# Patient Record
Sex: FEMALE | Race: WHITE | NOT HISPANIC OR LATINO | Employment: OTHER | URBAN - METROPOLITAN AREA
[De-identification: names, ages, dates, MRNs, and addresses within clinical notes are randomized per-mention and may not be internally consistent; named-entity substitution may affect disease eponyms.]

---

## 2017-01-20 ENCOUNTER — TELEPHONE (OUTPATIENT)
Dept: CARDIOLOGY | Facility: MEDICAL CENTER | Age: 67
End: 2017-01-20

## 2017-01-20 NOTE — TELEPHONE ENCOUNTER
Called by Dr. Weaver patient had an abnormal EKG apparently asymptomatic and on review looks to be junctional bradycardia with retrograde P waves.    Given she is asymptomatic she can follow-up with us at the next available appointment in [place] with the next month or 2 otherwise she'll have to come up to [place]    It is my pleasure to participate in the care of Ms. Sepulveda.  Please do not hesitate to contact me with questions or concerns.    Braxton Valencia MD PhD FAC  Cardiologist [facility] Heart and Vascular Health

## 2017-01-21 NOTE — TELEPHONE ENCOUNTER
Ashlee Sepulveda  Female, 66 y.o., 1950  Last Weight:  68.04 kg (150 lb)  Phone:  [phone]  PCP:  Karlie Ramos  Language:  English  Need Interp:  No  AllergiesNo Known Allergies  Health Maintenance:  Due  FYINone  Primary Ins:  MEDICARE  MRN:  [id number]  MyChart:  Code Exp  Next Appt:  02/15/2017      Call Documentation      Braxton Valencia M.D. at 1/20/2017 11:48 AM      Status: Signed         Expand All Collapse All    Called by Dr. Weaver patient had an abnormal EKG apparently asymptomatic and on review looks to be junctional bradycardia with retrograde P waves.    Given she is asymptomatic she can follow-up with us at the next available appointment in [place] with the next month or 2 otherwise she'll have to come up to [place]    It is my pleasure to participate in the care of Ms. Sepulveda.  Please do not hesitate to contact me with questions or concerns.    Braxton Valencia MD PhD [facility]  Cardiologist [facility] Heart and Vascular [facility]                         Visit Pharmacy      [facility] [address] [place] [address]           Routing History      Priority Sent On From To Message Type      1/20/2017 11:51 AM Braxton Valencia M.D. Margo Cam R.N. Patient Call       Created by      Braxton Valencia M.D. on 01/20/2017 11:48 AM         Patient scheduled for Feb 15th.

## 2017-02-15 ENCOUNTER — OFFICE VISIT (OUTPATIENT)
Dept: CARDIOLOGY | Facility: CLINIC | Age: 67
End: 2017-02-15
Payer: MEDICARE

## 2017-02-15 VITALS
HEART RATE: 74 BPM | WEIGHT: 160 LBS | HEIGHT: 65 IN | SYSTOLIC BLOOD PRESSURE: 130 MMHG | BODY MASS INDEX: 26.66 KG/M2 | DIASTOLIC BLOOD PRESSURE: 80 MMHG

## 2017-02-15 DIAGNOSIS — I10 ESSENTIAL HYPERTENSION, BENIGN: ICD-10-CM

## 2017-02-15 DIAGNOSIS — I25.5 ISCHEMIC CARDIOMYOPATHY: ICD-10-CM

## 2017-02-15 DIAGNOSIS — E78.5 DYSLIPIDEMIA: ICD-10-CM

## 2017-02-15 DIAGNOSIS — I44.2 COMPLETE HEART BLOCK (HCC): Primary | ICD-10-CM

## 2017-02-15 DIAGNOSIS — Z95.5 S/P CORONARY ARTERY STENT PLACEMENT: ICD-10-CM

## 2017-02-15 DIAGNOSIS — I25.10 CORONARY ARTERY DISEASE INVOLVING NATIVE CORONARY ARTERY OF NATIVE HEART WITHOUT ANGINA PECTORIS: ICD-10-CM

## 2017-02-15 DIAGNOSIS — I51.9 SYSTOLIC DYSFUNCTION: ICD-10-CM

## 2017-02-15 PROCEDURE — 1036F TOBACCO NON-USER: CPT | Performed by: INTERNAL MEDICINE

## 2017-02-15 PROCEDURE — 1101F PT FALLS ASSESS-DOCD LE1/YR: CPT | Mod: 8P | Performed by: INTERNAL MEDICINE

## 2017-02-15 PROCEDURE — 3017F COLORECTAL CA SCREEN DOC REV: CPT | Mod: 1P | Performed by: INTERNAL MEDICINE

## 2017-02-15 PROCEDURE — 99215 OFFICE O/P EST HI 40 MIN: CPT | Performed by: INTERNAL MEDICINE

## 2017-02-15 PROCEDURE — G8598 ASA/ANTIPLAT THER USED: HCPCS | Performed by: INTERNAL MEDICINE

## 2017-02-15 PROCEDURE — G8484 FLU IMMUNIZE NO ADMIN: HCPCS | Performed by: INTERNAL MEDICINE

## 2017-02-15 PROCEDURE — G8420 CALC BMI NORM PARAMETERS: HCPCS | Performed by: INTERNAL MEDICINE

## 2017-02-15 PROCEDURE — G8432 DEP SCR NOT DOC, RNG: HCPCS | Performed by: INTERNAL MEDICINE

## 2017-02-15 PROCEDURE — 3014F SCREEN MAMMO DOC REV: CPT | Mod: 8P | Performed by: INTERNAL MEDICINE

## 2017-02-15 PROCEDURE — 4040F PNEUMOC VAC/ADMIN/RCVD: CPT | Mod: 8P | Performed by: INTERNAL MEDICINE

## 2017-02-15 RX ORDER — AMLODIPINE BESYLATE 5 MG/1
5 TABLET ORAL DAILY
COMMUNITY
End: 2018-02-27 | Stop reason: SDUPTHER

## 2017-02-15 RX ORDER — ATORVASTATIN CALCIUM 80 MG/1
80 TABLET, FILM COATED ORAL NIGHTLY
COMMUNITY
End: 2017-02-15 | Stop reason: SDUPTHER

## 2017-02-15 RX ORDER — CLOPIDOGREL BISULFATE 75 MG/1
75 TABLET ORAL DAILY
COMMUNITY
End: 2017-11-16

## 2017-02-15 RX ORDER — ATORVASTATIN CALCIUM 80 MG/1
80 TABLET, FILM COATED ORAL DAILY
Qty: 30 TAB | Refills: 11 | Status: SHIPPED | OUTPATIENT
Start: 2017-02-15 | End: 2018-02-27 | Stop reason: SDUPTHER

## 2017-02-15 ASSESSMENT — ENCOUNTER SYMPTOMS: CARDIOVASCULAR NEGATIVE: 1

## 2017-02-15 NOTE — Clinical Note
Name:          Ashlee Sepulveda   YOB: 1950  Date:     2/16/2017      Kasandra Sinha M.D.  [address] [facility] [address]     Nikhil Perales MD  [address], [address]  [place] [address]  Phone: [phone]  Back Line: [phone]  Fax: [phone]  E-mail: [email or web address]   Dear Dr. Sinha,    We had the pleasure of seeing your patient, Ashlee Sepulveda, in Cardiology Clinic at [facility] Heart and Vascular today.    As you know, she is a 66-year-old woman with a history of coronary artery disease status post anterior STEMI in 10/2016 at which time she had TPA that recannalized her LAD followed by salvage PCI to her LAD and OM with drug eluting stents. She had an ejection fraction of 45% by echocardiogram at that time. She was lost to follow-up for a time, but has been referred back related to complete heart block (asymptomatic) incidentally on EKG.    I repeated an EKG in our office today and she continues to be in complete heart block with a junctional escape rate of 60 beats per minute, again asymptomatic. Related to that I have scheduled a chemistry panel and TSH which I have not seen in her work-up at this point. I ordered an echocardiogram to re-evaluate her ejection fraction. Provided that she has no reversible cause of heart block (pending chemistry panel and TSH), I will schedule her for pacemaker implantation. I did not see a statin on her medication list, and I added atorvastatin.    We will have the patient follow-up in 3 months with pacemaker implantation in the meantime presumably.    Thank you for the referral and please do not hesitate to contact me at any time. My contact information is listed above.    This note was dictated using Dragon speech recognition software.     A full note including my physical examination and a full list of rectified medications is available in our medical record, and can be faxed as well.    Nikhil Perales  MD  Cardiologist  [facility] for Heart and Vascular Health

## 2017-02-16 ENCOUNTER — TELEPHONE (OUTPATIENT)
Dept: CARDIOLOGY | Facility: MEDICAL CENTER | Age: 67
End: 2017-02-16

## 2017-02-16 NOTE — TELEPHONE ENCOUNTER
Called patient's home number - a male at that number advised me to call her work. I called and LM at patient's work to call me back to schedule PM insert.

## 2017-02-16 NOTE — PROGRESS NOTES
Subjective:   Ashlee Sepulveda is a 66-year-old woman with a history of coronary artery disease status post anterior STEMI in 10/2016 at which time she had TPA that recannalized her LAD followed by salvage PCI to her LAD and OM with drug eluting stents. She had an ejection fraction of 45% by echocardiogram at that time. She was lost to follow-up for a time, but has been referred back related to complete heart block (asymptomatic) incidentally on EKG.     she has no cardiovascular complaints today, specifically no syncope.  She denies any new onset fatigue, and has no dyspnea nor chest discomfort with her highest level of physical activity including housework 5 days per week.     I asked her about former use of statins given that she has not been prescribed one previously.  She denied any prior adverse effects with statin therapy.    Past Medical History   Diagnosis Date   • Rectal cancer (CMS-HCC)      10 years ago   • Myocardial infarct (CMS-HCC)    • Hypertension    • S/P coronary artery stent placement 2/15/2017   • Ischemic cardiomyopathy 10/7/2015   • Essential hypertension, benign 2/15/2017   • Dyslipidemia 2/15/2017   • Coronary artery disease involving native coronary artery of native heart without angina pectoris 2/15/2017     Past Surgical History   Procedure Laterality Date   • Stent placement       left groin     History reviewed. No pertinent family history.  History   Smoking status   • Former Smoker -- 1.00 packs/day for 20 years   • Types: Cigarettes   • Quit date: 09/30/2015   Smokeless tobacco   • Never Used     No Known Allergies  Outpatient Encounter Prescriptions as of 2/15/2017   Medication Sig Dispense Refill   • amlodipine (NORVASC) 5 MG Tab Take 5 mg by mouth every day.     • clopidogrel (PLAVIX) 75 MG Tab Take 75 mg by mouth every day.     • atorvastatin (LIPITOR) 80 MG tablet Take 1 Tab by mouth every day. 30 Tab 11   • nitroglycerin (NITROSTAT) 0.4 MG SL Tab Place 1 Tab under tongue as  "needed for Chest Pain. 25 Tab 0   • lisinopril (PRINIVIL) 10 MG Tab Take 1 Tab by mouth every day. 30 Tab 0   • [DISCONTINUED] atorvastatin (LIPITOR) 80 MG tablet Take 80 mg by mouth every evening.     • sulfamethoxazole-trimethoprim (BACTRIM DS) 800-160 MG tablet Take 1 Tab by mouth 2 times a day. 20 Tab 0   • aspirin (ASA) 325 MG Tab Take 1 Tab by mouth every day. 100 Tab 3     No facility-administered encounter medications on file as of 2/15/2017.     Review of Systems   Cardiovascular: Negative.    All other systems reviewed and are negative.       Objective:   /80 mmHg  Pulse 74  Ht 1.651 m (5' 5\")  Wt 72.576 kg (160 lb)  BMI 26.63 kg/m2    Physical Exam   Constitutional: She is oriented to person, place, and time. She appears well-developed and well-nourished. No distress.   Pleasant, middle-aged appearing woman in no distress   HENT:   Head: Normocephalic and atraumatic.   Eyes: Conjunctivae and EOM are normal. Pupils are equal, round, and reactive to light. No scleral icterus.   Neck: Neck supple. No JVD present. No tracheal deviation present.   Cardiovascular: Normal rate, normal heart sounds and intact distal pulses.  An irregular rhythm present. Exam reveals no gallop and no friction rub.    No murmur heard.  Pulses:       Dorsalis pedis pulses are 2+ on the right side, and 2+ on the left side.   No carotid bruits   Pulmonary/Chest: Effort normal and breath sounds normal. No stridor. No respiratory distress. She has no wheezes. She has no rales.   Abdominal: Soft. Bowel sounds are normal. She exhibits no distension.   Musculoskeletal: She exhibits no edema.   Neurological: She is alert and oriented to person, place, and time.   Skin: Skin is warm and dry. No rash noted. She is not diaphoretic. No erythema. No pallor.   Psychiatric: She has a normal mood and affect. Judgment and thought content normal.   Vitals reviewed.      Assessment:     1. Complete heart block (CMS-[facility])  ECHOCARDIOGRAM COMP " W/O CONT    BASIC METABOLIC PANEL    TSH+FREE T4   2. Coronary artery disease involving native coronary artery of native heart without angina pectoris  ECHOCARDIOGRAM COMP W/O CONT    atorvastatin (LIPITOR) 80 MG tablet   3. Ischemic cardiomyopathy  ECHOCARDIOGRAM COMP W/O CONT    atorvastatin (LIPITOR) 80 MG tablet   4. Systolic dysfunction  ECHOCARDIOGRAM COMP W/O CONT   5. Essential hypertension, benign     6. Dyslipidemia  atorvastatin (LIPITOR) 80 MG tablet   7. S/P coronary artery stent placement         Medical Decision Making:  Today's Assessment / Status / Plan:     Medical Decision Making:    I repeated an EKG in our office today and she continues to be in complete heart block with a junctional escape rate of 60 beats per minute, again asymptomatic.  Related to that I have scheduled a chemistry panel and TSH which I have not seen in her work-up at this point.  I ordered an echocardiogram to re-evaluate her ejection fraction.  Provided that she has no reversible cause of heart block (pending chemistry panel and TSH), I will schedule her for pacemaker implantation.  I did not see a statin on her medication list, and I added atorvastatin.    Nikhil Perales MD  Cardiologist, [facility] Heart and Vascular [place]

## 2017-02-16 NOTE — PROGRESS NOTES
Name:          Ashlee Sepulveda   YOB: 1950  Date:     2/16/2017      Kasadnra Sinha M.D.  [address] [facility] [address]     Nikhil Perales MD  [address], [address]  [place] [address]  Phone: [phone]  Back Line: [phone]  Fax: [phone]  E-mail: [email or web address]   Dear Dr. Sinha,    We had the pleasure of seeing your patient, Ashlee Sepulveda, in Cardiology Clinic at [facility] Heart and Vascular today.    As you know, she is a 66-year-old woman with a history of coronary artery disease status post anterior STEMI in 10/2016 at which time she had TPA that recannalized her LAD followed by salvage PCI to her LAD and OM with drug eluting stents. She had an ejection fraction of 45% by echocardiogram at that time. She was lost to follow-up for a time, but has been referred back related to complete heart block (asymptomatic) incidentally on EKG.    I repeated an EKG in our office today and she continues to be in complete heart block with a junctional escape rate of 60 beats per minute, again asymptomatic. Related to that I have scheduled a chemistry panel and TSH which I have not seen in her work-up at this point. I ordered an echocardiogram to re-evaluate her ejection fraction. Provided that she has no reversible cause of heart block (pending chemistry panel and TSH), I will schedule her for pacemaker implantation. I did not see a statin on her medication list, and I added atorvastatin.    We will have the patient follow-up in 3 months with pacemaker implantation in the meantime presumably.    Thank you for the referral and please do not hesitate to contact me at any time. My contact information is listed above.    This note was dictated using Dragon speech recognition software.     A full note including my physical examination and a full list of rectified medications is available in our medical record, and can be faxed as well.    Nikhil Perales  MD  Cardiologist  [facility] for Heart and Vascular Health

## 2017-02-16 NOTE — TELEPHONE ENCOUNTER
Patient scheduled for PM insert on 2-22-17 at [facility] with Dr. Lerma

## 2017-02-16 NOTE — TELEPHONE ENCOUNTER
----- Message from Luis Miguel Lopez Ass't sent at 2/15/2017  3:50 PM PST -----  Per Dr. QUEZADA, would like to set this patient up with a pacemaker implant, Ins: [employer], Dx:  Complete heart block.  Good phone number to reach patient:  [phone].

## 2017-02-22 ENCOUNTER — APPOINTMENT (OUTPATIENT)
Dept: RADIOLOGY | Facility: MEDICAL CENTER | Age: 67
End: 2017-02-22
Attending: INTERNAL MEDICINE
Payer: MEDICARE

## 2017-02-22 ENCOUNTER — HOSPITAL ENCOUNTER (OUTPATIENT)
Facility: MEDICAL CENTER | Age: 67
End: 2017-02-22
Attending: INTERNAL MEDICINE | Admitting: INTERNAL MEDICINE
Payer: MEDICARE

## 2017-02-22 DIAGNOSIS — I25.5 ISCHEMIC CARDIOMYOPATHY: ICD-10-CM

## 2017-02-22 DIAGNOSIS — I44.2 COMPLETE HEART BLOCK (HCC): ICD-10-CM

## 2017-02-22 DIAGNOSIS — I25.10 CORONARY ARTERY DISEASE INVOLVING NATIVE CORONARY ARTERY OF NATIVE HEART WITHOUT ANGINA PECTORIS: ICD-10-CM

## 2017-02-22 LAB — EKG IMPRESSION: NORMAL

## 2017-02-22 PROCEDURE — 93018 CV STRESS TEST I&R ONLY: CPT | Performed by: INTERNAL MEDICINE

## 2017-02-22 PROCEDURE — 93005 ELECTROCARDIOGRAM TRACING: CPT | Performed by: INTERNAL MEDICINE

## 2017-02-22 PROCEDURE — 93010 ELECTROCARDIOGRAM REPORT: CPT | Performed by: INTERNAL MEDICINE

## 2017-02-22 PROCEDURE — 93017 CV STRESS TEST TRACING ONLY: CPT | Performed by: INTERNAL MEDICINE

## 2017-02-22 NOTE — PROGRESS NOTES
Pt's procedure cancelled. MD follow up ph # provided to patient. Pt instructed to come back to cath lab waiting area to speak with MD after she and her  have lunch.

## 2017-03-03 DIAGNOSIS — I44.2 COMPLETE HEART BLOCK (HCC): ICD-10-CM

## 2017-03-03 DIAGNOSIS — I25.5 ISCHEMIC CARDIOMYOPATHY: ICD-10-CM

## 2017-03-03 DIAGNOSIS — I51.9 SYSTOLIC DYSFUNCTION: ICD-10-CM

## 2017-03-03 DIAGNOSIS — I25.10 CORONARY ARTERY DISEASE INVOLVING NATIVE CORONARY ARTERY OF NATIVE HEART WITHOUT ANGINA PECTORIS: ICD-10-CM

## 2017-05-17 ENCOUNTER — OFFICE VISIT (OUTPATIENT)
Dept: CARDIOLOGY | Facility: CLINIC | Age: 67
End: 2017-05-17
Payer: MEDICARE

## 2017-05-17 VITALS
HEIGHT: 66 IN | OXYGEN SATURATION: 95 % | DIASTOLIC BLOOD PRESSURE: 90 MMHG | SYSTOLIC BLOOD PRESSURE: 130 MMHG | WEIGHT: 155 LBS | HEART RATE: 54 BPM | BODY MASS INDEX: 24.91 KG/M2

## 2017-05-17 DIAGNOSIS — I25.10 CORONARY ARTERY DISEASE INVOLVING NATIVE CORONARY ARTERY OF NATIVE HEART WITHOUT ANGINA PECTORIS: ICD-10-CM

## 2017-05-17 DIAGNOSIS — I49.5 SICK SINUS SYNDROME (HCC): Primary | ICD-10-CM

## 2017-05-17 DIAGNOSIS — I10 ESSENTIAL HYPERTENSION, BENIGN: ICD-10-CM

## 2017-05-17 DIAGNOSIS — E78.5 DYSLIPIDEMIA: ICD-10-CM

## 2017-05-17 DIAGNOSIS — I21.09 ST ELEVATION MYOCARDIAL INFARCTION (STEMI) OF ANTERIOR WALL, SUBSEQUENT EPISODE OF CARE (HCC): ICD-10-CM

## 2017-05-17 DIAGNOSIS — I25.5 ISCHEMIC CARDIOMYOPATHY: ICD-10-CM

## 2017-05-17 DIAGNOSIS — Z95.5 S/P CORONARY ARTERY STENT PLACEMENT: ICD-10-CM

## 2017-05-17 PROBLEM — I44.2 COMPLETE HEART BLOCK (HCC): Status: RESOLVED | Noted: 2017-02-15 | Resolved: 2017-05-17

## 2017-05-17 PROCEDURE — 1036F TOBACCO NON-USER: CPT | Performed by: INTERNAL MEDICINE

## 2017-05-17 PROCEDURE — 1101F PT FALLS ASSESS-DOCD LE1/YR: CPT | Mod: 8P | Performed by: INTERNAL MEDICINE

## 2017-05-17 PROCEDURE — G8598 ASA/ANTIPLAT THER USED: HCPCS | Performed by: INTERNAL MEDICINE

## 2017-05-17 PROCEDURE — G8432 DEP SCR NOT DOC, RNG: HCPCS | Performed by: INTERNAL MEDICINE

## 2017-05-17 PROCEDURE — 99214 OFFICE O/P EST MOD 30 MIN: CPT | Performed by: INTERNAL MEDICINE

## 2017-05-17 PROCEDURE — 3014F SCREEN MAMMO DOC REV: CPT | Mod: 8P | Performed by: INTERNAL MEDICINE

## 2017-05-17 PROCEDURE — 4040F PNEUMOC VAC/ADMIN/RCVD: CPT | Mod: 8P | Performed by: INTERNAL MEDICINE

## 2017-05-17 PROCEDURE — G8419 CALC BMI OUT NRM PARAM NOF/U: HCPCS | Performed by: INTERNAL MEDICINE

## 2017-05-17 ASSESSMENT — ENCOUNTER SYMPTOMS: CARDIOVASCULAR NEGATIVE: 1

## 2017-05-17 NOTE — PROGRESS NOTES
Name:          Ashlee Sepulveda   YOB: 1950  Date:     5/17/2017      Kasandra Sinha M.D.  [address] [facility] [address]     Nikhil Perales MD  [address], [facility] [address]  [place] [address]  Phone: [phone]  Back Line: [phone]  Fax: [phone]  E-mail: [email or web address]   Dear Dr. Sinha,    We had the pleasure of seeing your patient, Ashlee Sepulveda, in Cardiology Clinic at [facility] Heart and Vascular today.    As you know, she is a 67-year-old woman with a history of coronary artery disease status post anterior STEMI in 10/2016 at which time she had TPA that recannalized her LAD followed by salvage PCI to her LAD and OM with drug eluting stents. She had an ejection fraction of 45% by echocardiogram at that time. She was lost to follow-up for a time, but has been referred back related to junctional escape secondary to sick sinus syndrome (asymptomatic).    She is doing very well, and again it been up in [facility] evaluated for her junction (retrograde) showing that she has no conduction system disease bundle of His. We did a submaximal exercise treadmill test at that time to rule out chronotropic incompetence, which she did not have. She had no ischemic changes on that test and a hypertensive response to exercise. At this point, her blood pressure is well controlled on amlodipine, and lisinopril. She continues on atorvastatin for her lipids. She had stopped aspirin related to senile purpura that was worse on dual antiplatelet therapy. I asked her to resume aspirin at 81 mg by mouth daily until October 2017. I ordered a repeat lipid panel to be done sometime before next visit in November. Otherwise, abdomen no changes to her medical regimen and scheduled no additional testing at this time. Also of note, she previously had an ejection fraction of 45%, which had resolved to normal left ventricular ejection fraction by repeat echocardiogram that we did in the evaluation for  possible pacemaker.    We will have the patient follow-up in November, 2017.    Thank you for the referral and please do not hesitate to contact me at any time. My contact information is listed above.    This note was dictated using Dragon speech recognition software.     A full note including my physical examination and a full list of rectified medications is available in our medical record, and can be faxed as well.    Nikhil Perales MD  Cardiologist  [facility] Heart and Vascular Health

## 2017-05-17 NOTE — Clinical Note
Name:          Ashlee Sepulveda   YOB: 1950  Date:     5/17/2017      Kasandra Sinha M.D.  [address] [facility] [address]     Nikhil Perales MD  [address], [facility] [address]  [place] [address]  Phone: [phone]  Back Line: [phone]  Fax: [phone]  E-mail: [email or web address]   Dear Dr. Sinha,    We had the pleasure of seeing your patient, Ashlee Sepulveda, in Cardiology Clinic at [facility] Heart and Vascular today.    As you know, she is a 67-year-old woman with a history of coronary artery disease status post anterior STEMI in 10/2016 at which time she had TPA that recannalized her LAD followed by salvage PCI to her LAD and OM with drug eluting stents. She had an ejection fraction of 45% by echocardiogram at that time. She was lost to follow-up for a time, but has been referred back related to junctional escape secondary to sick sinus syndrome (asymptomatic).    She is doing very well, and again it been up in [facility] evaluated for her junction (retrograde) showing that she has no conduction system disease bundle of His. We did a submaximal exercise treadmill test at that time to rule out chronotropic incompetence, which she did not have. She had no ischemic changes on that test and a hypertensive response to exercise. At this point, her blood pressure is well controlled on amlodipine, and lisinopril. She continues on atorvastatin for her lipids. She had stopped aspirin related to senile purpura that was worse on dual antiplatelet therapy. I asked her to resume aspirin at 81 mg by mouth daily until October 2017. I ordered a repeat lipid panel to be done sometime before next visit in November. Otherwise, abdomen no changes to her medical regimen and scheduled no additional testing at this time. Also of note, she previously had an ejection fraction of 45%, which had resolved to normal left ventricular ejection fraction by repeat echocardiogram that we did in the evaluation for  possible pacemaker.    We will have the patient follow-up in November, 2017.    Thank you for the referral and please do not hesitate to contact me at any time. My contact information is listed above.    This note was dictated using Dragon speech recognition software.     A full note including my physical examination and a full list of rectified medications is available in our medical record, and can be faxed as well.    Nikhil Perales MD  Cardiologist  [facility] Heart and Vascular Health

## 2017-05-17 NOTE — PROGRESS NOTES
Subjective:   Ashlee Sepulveda is a 67 -year-old woman with a history of coronary artery disease status post anterior STEMI in 10/2016 at which time she had TPA that recannalized her LAD followed by salvage PCI to her LAD and OM with drug eluting stents. She had an ejection fraction of 45% by echocardiogram at that time. She was lost to follow-up for a time, but has been referred back related to junctional escape secondary to sick sinus syndrome (asymptomatic).    She is doing well today, and has no cardiovascular complaints. Specifically, she has had no syncopal episodes in conjunction with her junctional rhythm. She had gone up to [place] in see my partner from electrophysiology Palmira Lerma M.D. He had pointed out that she does have retrograde conduction in conjunction with her junctional escape rhythm. He recommended an exercise treadmill test to evaluate for chronotropic competence that she was asymptomatic. We did that test and she had neither ischemia nor chronotropic incompetence and a pacemaker was not implanted as was not needed. She is doing well with her medications except that she has senile purpura in association with aspirin and Plavix use. Related to that, she did stop aspirin, which she previously been taking 325 mg by mouth daily. Otherwise, she is tolerating her medications quite well.    Past Medical History   Diagnosis Date   • Rectal cancer (CMS-[facility])      10 years ago   • Myocardial infarct (CMS-[facility])    • Hypertension    • S/P coronary artery stent placement 2/15/2017   • Ischemic cardiomyopathy 10/7/2015   • Essential hypertension, benign 2/15/2017   • Dyslipidemia 2/15/2017   • Coronary artery disease involving native coronary artery of native heart without angina pectoris 2/15/2017     Past Surgical History   Procedure Laterality Date   • Stent placement       left groin     History reviewed. No pertinent family history.  History   Smoking status   • Former Smoker -- 1.00 packs/day for 20 years  "  • Types: Cigarettes   • Quit date: 09/30/2015   Smokeless tobacco   • Never Used     No Known Allergies  Outpatient Encounter Prescriptions as of 5/17/2017   Medication Sig Dispense Refill   • amlodipine (NORVASC) 5 MG Tab Take 5 mg by mouth every day.     • clopidogrel (PLAVIX) 75 MG Tab Take 75 mg by mouth every day.     • atorvastatin (LIPITOR) 80 MG tablet Take 1 Tab by mouth every day. 30 Tab 11   • nitroglycerin (NITROSTAT) 0.4 MG SL Tab Place 1 Tab under tongue as needed for Chest Pain. 25 Tab 0   • lisinopril (PRINIVIL) 10 MG Tab Take 1 Tab by mouth every day. 30 Tab 0   • sulfamethoxazole-trimethoprim (BACTRIM DS) 800-160 MG tablet Take 1 Tab by mouth 2 times a day. 20 Tab 0   • aspirin (ASA) 325 MG Tab Take 1 Tab by mouth every day. 100 Tab 3     No facility-administered encounter medications on file as of 5/17/2017.     Review of Systems   Cardiovascular: Negative.    All other systems reviewed and are negative.       Objective:   /90 mmHg  Pulse 54  Ht 1.676 m (5' 5.98\")  Wt 70.308 kg (155 lb)  BMI 25.03 kg/m2  SpO2 95%    Physical Exam   Constitutional: She is oriented to person, place, and time. She appears well-developed and well-nourished. No distress.   Pleasant, middle-aged appearing woman in no distress   HENT:   Head: Normocephalic and atraumatic.   Eyes: Conjunctivae and EOM are normal. Pupils are equal, round, and reactive to light. No scleral icterus.   Neck: Neck supple. No JVD present. No tracheal deviation present.   Cardiovascular: Regular rhythm, normal heart sounds and intact distal pulses.  Bradycardia present.  Exam reveals no gallop and no friction rub.    No murmur heard.  Pulses:       Dorsalis pedis pulses are 2+ on the right side, and 2+ on the left side.   No carotid bruits   Pulmonary/Chest: Effort normal and breath sounds normal. No stridor. No respiratory distress. She has no wheezes. She has no rales.   Abdominal: Soft. Bowel sounds are normal. She exhibits no " distension.   Musculoskeletal: She exhibits no edema.   Neurological: She is alert and oriented to person, place, and time.   Skin: Skin is warm and dry. No rash noted. She is not diaphoretic. No erythema. No pallor.   Psychiatric: She has a normal mood and affect. Judgment and thought content normal.   Vitals reviewed.    Echocardiogram, 3/3/2017: Normal left ventricular ejection fraction, 65%, aortic sclerosis, mitral annular calcification, mild left atrial enlargement    Labs, 2/21/2017: Sodium 142, potassium 4.1, chloride 105, CO2 26, BUN 13, creatinine 0.99, glucose 77, calcium 9.1  TSH 1.4, free T4 1 0.17    2/23/2017: Duration 3:57 minutes, 7.0 metabolic equivalents, achieved 70% of her age-predicted maximum heart rate (submaximal test). Frequent PVCs, hypertensive response to exercise, no ischemic changes noted    Assessment:     1. Sick sinus syndrome (CMS-[facility])     2. ST elevation myocardial infarction (STEMI) of anterior wall, subsequent episode of care ([facility])     3. Coronary artery disease involving native coronary artery of native heart without angina pectoris     4. Dyslipidemia     5. Essential hypertension, benign     6. S/P coronary artery stent placement     7. Ischemic cardiomyopathy         Medical Decision Making:  Today's Assessment / Status / Plan:     Medical Decision Making:    She is doing very well, and again it been up in [place] evaluated for her junction (retrograde) showing that she has no conduction system disease bundle of His. We did a submaximal exercise treadmill test at that time to rule out chronotropic incompetence, which she did not have. She had no ischemic changes on that test and a hypertensive response to exercise. At this point, her blood pressure is well controlled on amlodipine, and lisinopril. She continues on atorvastatin for her lipids. She had stopped aspirin related to senile purpura that was worse on dual antiplatelet therapy. I asked her to resume aspirin at 81 mg  by mouth daily until October 2017. I ordered a repeat lipid panel to be done sometime before next visit in November. Otherwise, abdomen no changes to her medical regimen and scheduled no additional testing at this time. Also of note, she previously had an ejection fraction of 45%, which had resolved to normal left ventricular ejection fraction by repeat echocardiogram that we did in the evaluation for possible pacemaker.    Nikhil Perales MD  Cardiologist, [facility]

## 2017-05-30 DIAGNOSIS — E78.5 DYSLIPIDEMIA: ICD-10-CM

## 2017-05-30 DIAGNOSIS — I25.10 CORONARY ARTERY DISEASE INVOLVING NATIVE CORONARY ARTERY OF NATIVE HEART WITHOUT ANGINA PECTORIS: ICD-10-CM

## 2017-11-03 ENCOUNTER — OFFICE VISIT (OUTPATIENT)
Dept: URGENT CARE | Facility: PHYSICIAN GROUP | Age: 67
End: 2017-11-03
Payer: MEDICARE

## 2017-11-03 VITALS
DIASTOLIC BLOOD PRESSURE: 82 MMHG | OXYGEN SATURATION: 97 % | SYSTOLIC BLOOD PRESSURE: 150 MMHG | HEIGHT: 66 IN | WEIGHT: 148.4 LBS | TEMPERATURE: 98.6 F | RESPIRATION RATE: 16 BRPM | BODY MASS INDEX: 23.85 KG/M2 | HEART RATE: 69 BPM

## 2017-11-03 DIAGNOSIS — R05.9 COUGH: ICD-10-CM

## 2017-11-03 PROCEDURE — 99214 OFFICE O/P EST MOD 30 MIN: CPT | Performed by: FAMILY MEDICINE

## 2017-11-03 RX ORDER — AZITHROMYCIN 250 MG/1
TABLET, FILM COATED ORAL
Qty: 6 TAB | Refills: 0 | Status: SHIPPED | OUTPATIENT
Start: 2017-11-03 | End: 2017-11-03 | Stop reason: SDUPTHER

## 2017-11-03 RX ORDER — AZITHROMYCIN 250 MG/1
TABLET, FILM COATED ORAL
Qty: 6 TAB | Refills: 0 | Status: SHIPPED | OUTPATIENT
Start: 2017-11-03 | End: 2017-11-16

## 2017-11-03 RX ORDER — PREDNISONE 20 MG/1
TABLET ORAL
Qty: 7 TAB | Refills: 0 | Status: SHIPPED | OUTPATIENT
Start: 2017-11-03 | End: 2017-11-16

## 2017-11-03 ASSESSMENT — PAIN SCALES - GENERAL: PAINLEVEL: NO PAIN

## 2017-11-03 NOTE — PROGRESS NOTES
Chief Complaint:    Chief Complaint   Patient presents with   • Cough     x 1 week       History of Present Illness:    This is a new problem. Symptoms x 7 days; has cough that is productive of clear/white mucus. Occl shortness of breath feeling. Not getting better. No fever, nasal symptoms, or sore throat.      Review of Systems:    Constitutional: Negative for fever, chills, and diaphoresis.   Eyes: Negative for change in vision, photophobia, pain, redness, and discharge.  ENT: Negative for ear pain, ear discharge, hearing loss, tinnitus, nasal congestion, nosebleeds, and sore throat.    Respiratory: See HPI.    Cardiovascular: Negative for chest pain, palpitations, orthopnea, claudication, leg swelling, and PND.   Gastrointestinal: Negative for abdominal pain, nausea, vomiting, diarrhea, constipation, blood in stool, and melena.   Genitourinary: Negative for dysuria, urinary urgency, urinary frequency, hematuria, and flank pain.   Musculoskeletal: Negative for myalgias, joint pain, neck pain, and back pain.   Skin: Negative for rash and itching.   Neurological: Negative for dizziness, tingling, tremors, sensory change, speech change, focal weakness, seizures, loss of consciousness, and headaches.   Endo: Negative for polydipsia.   Heme: On Plavix.  Psychiatric/Behavioral: Negative for depression, suicidal ideas, hallucinations, memory loss and substance abuse. The patient is not nervous/anxious and does not have insomnia.      Past Medical History:    Past Medical History:   Diagnosis Date   • Coronary artery disease involving native coronary artery of native heart without angina pectoris 2/15/2017   • Dyslipidemia 2/15/2017   • Essential hypertension, benign 2/15/2017   • Hypertension    • Ischemic cardiomyopathy 10/7/2015   • Myocardial infarct    • Rectal cancer (CMS-[facility])     10 years ago   • S/P coronary artery stent placement 2/15/2017       Past Surgical History:    Past Surgical History:   Procedure  "Laterality Date   • STENT PLACEMENT      left groin       Social History:    Social History     Social History   • Marital status:      Spouse name: N/A   • Number of children: N/A   • Years of education: N/A     Occupational History   • Not on file.     Social History Main Topics   • Smoking status: Former Smoker     Packs/day: 1.00     Years: 20.00     Types: Cigarettes     Quit date: 9/30/2015   • Smokeless tobacco: Never Used   • Alcohol use Yes      Comment: occ - once a month or less   • Drug use: No   • Sexual activity: Not on file     Other Topics Concern   • Not on file     Social History Narrative   • No narrative on file       Family History:    History reviewed. No pertinent family history.    Medications:    Current Outpatient Prescriptions on File Prior to Visit   Medication Sig Dispense Refill   • clopidogrel (PLAVIX) 75 MG Tab Take 75 mg by mouth every day.     • atorvastatin (LIPITOR) 80 MG tablet Take 1 Tab by mouth every day. 30 Tab 11   • lisinopril (PRINIVIL) 10 MG Tab Take 1 Tab by mouth every day. 30 Tab 0   • amlodipine (NORVASC) 5 MG Tab Take 5 mg by mouth every day.     • aspirin (ASA) 325 MG Tab Take 1 Tab by mouth every day. 100 Tab 3   • nitroglycerin (NITROSTAT) 0.4 MG SL Tab Place 1 Tab under tongue as needed for Chest Pain. 25 Tab 0     No current facility-administered medications on file prior to visit.        Allergies:    No Known Allergies      Vitals:    Vitals:    11/03/17 1405   BP: 150/82   Pulse: 69   Resp: 16   Temp: 37 °C (98.6 °F)   SpO2: 97%   Weight: 67.3 kg (148 lb 6.4 oz)   Height: 1.676 m (5' 6\")       Physical Exam:    Constitutional: Vital signs reviewed. Appears well-developed and well-nourished. No acute distress.   Eyes: Sclera white, conjunctivae clear.   ENT: External ears normal. External auditory canals normal without discharge. TMs translucent and non-bulging. Hearing normal. Nasal mucosa pink. Lips/teeth are normal. Oral mucosa pink and moist. " Posterior pharynx: WNL.  Neck: Neck supple.   Cardiovascular: Regular rate and rhythm. No murmur.  Pulmonary/Chest: Respirations non-labored. Mild occasional wheezing bilaterally, but mostly clear to auscultation bilaterally.  Lymph: Cervical nodes without tenderness or enlargement.  Musculoskeletal: Normal gait. Normal range of motion. No muscular atrophy or weakness.  Neurological: Alert and oriented to person, place, and time. Muscle tone normal. Coordination normal.   Skin: No rashes or lesions. Warm, dry, normal turgor.  Psychiatric: Normal mood and affect. Behavior is normal. Judgment and thought content normal.       Assessment / Plan:    1. Cough  - predniSONE (DELTASONE) 20 MG Tab; 1 TAB ONCE A DAY ONLY IF NEEDED FOR COUGH. TAKE WITH FOOD.  Dispense: 7 Tab; Refill: 0  - azithromycin (ZITHROMAX) 250 MG Tab; 2 TABS ON DAY 1, 1 TAB ON DAYS 2-5.  Dispense: 6 Tab; Refill: 0      Discussed with her DDX and management options.    Agreeable to treat with Prednisone for allergic etiology.    Back-up Rx for Z-judie antibiotic she will fill and take if getting much worse or not improving at all with Prednisone in 3-5 days.    Follow-up with PCP or urgent care if getting worse or not better with above.

## 2017-11-08 DIAGNOSIS — I25.10 CORONARY ARTERY DISEASE INVOLVING NATIVE CORONARY ARTERY OF NATIVE HEART WITHOUT ANGINA PECTORIS: ICD-10-CM

## 2017-11-08 DIAGNOSIS — E78.5 DYSLIPIDEMIA: ICD-10-CM

## 2017-11-16 ENCOUNTER — OFFICE VISIT (OUTPATIENT)
Dept: CARDIOLOGY | Facility: CLINIC | Age: 67
End: 2017-11-16
Payer: MEDICARE

## 2017-11-16 VITALS
WEIGHT: 149 LBS | OXYGEN SATURATION: 95 % | BODY MASS INDEX: 23.95 KG/M2 | HEIGHT: 66 IN | SYSTOLIC BLOOD PRESSURE: 160 MMHG | DIASTOLIC BLOOD PRESSURE: 90 MMHG | HEART RATE: 63 BPM

## 2017-11-16 DIAGNOSIS — I25.10 CORONARY ARTERY DISEASE INVOLVING NATIVE CORONARY ARTERY OF NATIVE HEART WITHOUT ANGINA PECTORIS: ICD-10-CM

## 2017-11-16 DIAGNOSIS — I10 ESSENTIAL HYPERTENSION, BENIGN: ICD-10-CM

## 2017-11-16 DIAGNOSIS — E78.5 DYSLIPIDEMIA: ICD-10-CM

## 2017-11-16 PROCEDURE — 99214 OFFICE O/P EST MOD 30 MIN: CPT | Performed by: INTERNAL MEDICINE

## 2017-11-16 RX ORDER — HYDROCHLOROTHIAZIDE 12.5 MG/1
12.5 TABLET ORAL DAILY
Qty: 30 TAB | Refills: 11 | Status: SHIPPED | OUTPATIENT
Start: 2017-11-16 | End: 2018-02-27 | Stop reason: SDUPTHER

## 2017-11-16 RX ORDER — LISINOPRIL 40 MG/1
40 TABLET ORAL DAILY
Qty: 90 TAB | Refills: 3 | Status: SHIPPED | OUTPATIENT
Start: 2017-11-16 | End: 2018-02-27 | Stop reason: SDUPTHER

## 2017-11-16 ASSESSMENT — ENCOUNTER SYMPTOMS
DIZZINESS: 0
PALPITATIONS: 0
ORTHOPNEA: 0
SHORTNESS OF BREATH: 0
FALLS: 0
PND: 0
LOSS OF CONSCIOUSNESS: 0
DEPRESSION: 0
ABDOMINAL PAIN: 0
BRUISES/BLEEDS EASILY: 0

## 2017-11-16 NOTE — PROGRESS NOTES
Subjective:   Ashlee Sepulveda is a 67 y.o. female with HTN, HLP, CAD s/p PCI and h/o ischemic cardiomyopathy (EF now normal) who is presenting to clinic for follow-up. She last saw Dr. Perales in February 2017. Has been doing well overall. No chest discomfort. Has been working in the yard regularly. No other exercise.    She quit her aspirin about 1 year ago because of significant bruising. No bleeding issues in the past.     Her BP is usually 140s/80s. Adds a lot of salt to all her meals. Has tried to avoid salt but has been unsuccessful.     Had some bradycardia for which she underwent an exercise treadmill test which demonstrated chrontoropic competence.     Past Medical History:   Diagnosis Date   • Coronary artery disease involving native coronary artery of native heart without angina pectoris 2/15/2017   • Dyslipidemia 2/15/2017   • Essential hypertension, benign 2/15/2017   • Hypertension    • Ischemic cardiomyopathy 10/7/2015   • Myocardial infarct    • Rectal cancer (CMS-[facility])     10 years ago   • S/P coronary artery stent placement 2/15/2017     Past Surgical History:   Procedure Laterality Date   • STENT PLACEMENT      left groin     History reviewed. No pertinent family history.  History   Smoking Status   • Former Smoker   • Packs/day: 1.00   • Years: 20.00   • Types: Cigarettes   • Quit date: 9/30/2015   Smokeless Tobacco   • Never Used     No Known Allergies  Outpatient Encounter Prescriptions as of 11/16/2017   Medication Sig Dispense Refill   • lisinopril (PRINIVIL, ZESTRIL) 40 MG tablet Take 1 Tab by mouth every day. 90 Tab 3   • aspirin EC (ECOTRIN) 81 MG Tablet Delayed Response Take 1 Tab by mouth every day. 30 Tab    • hydrochlorothiazide (HYDRODIURIL) 12.5 MG tablet Take 1 Tab by mouth every day. 30 Tab 11   • amlodipine (NORVASC) 5 MG Tab Take 5 mg by mouth every day.     • atorvastatin (LIPITOR) 80 MG tablet Take 1 Tab by mouth every day. 30 Tab 11   • [DISCONTINUED] predniSONE (DELTASONE) 20  "MG Tab 1 TAB ONCE A DAY ONLY IF NEEDED FOR COUGH. TAKE WITH FOOD. 7 Tab 0   • [DISCONTINUED] azithromycin (ZITHROMAX) 250 MG Tab 2 TABS ON DAY 1, 1 TAB ON DAYS 2-5. 6 Tab 0   • [DISCONTINUED] clopidogrel (PLAVIX) 75 MG Tab Take 75 mg by mouth every day.     • nitroglycerin (NITROSTAT) 0.4 MG SL Tab Place 1 Tab under tongue as needed for Chest Pain. 25 Tab 0   • [DISCONTINUED] aspirin (ASA) 325 MG Tab Take 1 Tab by mouth every day. 100 Tab 3   • [DISCONTINUED] lisinopril (PRINIVIL) 10 MG Tab Take 1 Tab by mouth every day. 30 Tab 0     No facility-administered encounter medications on file as of 11/16/2017.      Review of Systems   Constitutional: Negative for malaise/fatigue.   HENT: Negative.    Respiratory: Negative for shortness of breath.    Cardiovascular: Negative for chest pain, palpitations, orthopnea, leg swelling and PND.   Gastrointestinal: Negative for abdominal pain.   Musculoskeletal: Negative for falls.   Skin: Negative.    Neurological: Negative for dizziness and loss of consciousness.   Endo/Heme/Allergies: Does not bruise/bleed easily.   Psychiatric/Behavioral: Negative for depression.   All other systems reviewed and are negative.       Objective:   /90   Pulse 63   Ht 1.676 m (5' 6\")   Wt 67.6 kg (149 lb)   SpO2 95%   BMI 24.05 kg/m²     Physical Exam   Constitutional: She is oriented to person, place, and time. No distress.   HENT:   Head: Normocephalic and atraumatic.   Eyes: Conjunctivae are normal.   Neck: Normal range of motion. Neck supple. No JVD present.   Cardiovascular: Normal rate, regular rhythm and normal heart sounds.  Exam reveals no gallop and no friction rub.    No murmur heard.  Pulmonary/Chest: Effort normal and breath sounds normal. No respiratory distress. She has no wheezes. She has no rales.   Abdominal: Soft. There is no tenderness.   Musculoskeletal: She exhibits no edema.   Neurological: She is alert and oriented to person, place, and time.   Skin: Skin is warm " and dry. She is not diaphoretic.   Psychiatric: She has a normal mood and affect.   Nursing note and vitals reviewed.      Echo in Feb 2017 was reviewed and showed  EF 65%. Grade I diastolic dysfunction. RVSP 25mmHg. Aortic sclerosis    Labs in Nov 2017 was performed  LDL 52.     Cardiac cath in October 2015.   POSTPROCEDURE DIAGNOSES:  1.  Three-vessel coronary artery disease with high-grade mid-left anterior   descending artery stenosis, high-grade proximal superior branch obtuse marginal   stenosis, high-grade ostial inferior branch obtuse marginal branch stenosis and  nonobstructive proximal posterior descending artery stenosis.  2.  Successful percutaneous transluminal coronary angioplasty/stent placement   of the mid left anterior descending artery with 2.5x15 mm Xience Alpine   drug-eluting stent.  3.  Successful percutaneous transluminal coronary angioplasty/stent placement   of the proximal superior branch of the obtuse marginal artery with 2.5x15 mm   Xience Alpine drug-eluting stent.  4.  Successful percutaneous transluminal coronary angioplasty/stent placement   of the ostial inferior branch of the obtuse marginal artery with 2.5x18 mm   Xience Alpine drug-eluting stent.  5.  Reduced left ventricular systolic function with ejection fraction of 35%,   anterior apical akinesis.  6.  Mildly elevated left ventricular end-diastolic pressure.  7.  Successful Angio-Seal closure.    Assessment:     1. Essential hypertension, benign  BASIC METABOLIC PANEL   2. Coronary artery disease involving native coronary artery of native heart without angina pectoris     3. Dyslipidemia         Medical Decision Making:  Today's Assessment / Status / Plan:     Hypertension - her BP is not at goal. Advised watching her salt intake, but patient reports having tried previously and failing. She is already on max dose of lisinopril. Continue norvasc at current dose. Start HCTZ 12.5mg daily. Chem-7 in about 2 weeks to evaluate  renal function.     CAD - s/p multi-vessel PCI as detailed above. Patient should ideally be on lifelong aspirin. We discussed the risks and benefits of aspirin use and patient is agreeable to starting asprin 81mg daily. Continue lipitor.     Hyperlipidemia - repeat lipid panel as above. Continue lipitor at current dose.     Return to clinic in 3 months or earlier if needed.    Thank you for allowing me to participate in the care of this patient. Please do not hesitate to contact me with any questions.    Patricia Carrington MD  Cardiologist  [facility]      PLEASE NOTE: This dictation was created using voice recognition software.

## 2017-11-16 NOTE — PATIENT INSTRUCTIONS
Continue taking lisinopril and amlodipine at current dose.   Start taking hydrochlorothiazide 12.5mg in the morning.   Get a non-fasting blood test in about 10 days after starting the new medicine.   Start a baby aspirin (81mg) daily.

## 2017-11-16 NOTE — LETTER
[facility] [address],   [address]  [place] [address]  Phone: [phone]  Fax: [phone]              Ashlee Sepulveda  1950    Encounter Date: 11/16/2017    Patricia Carrington M.D.          PROGRESS NOTE:  Subjective:   Ashlee Sepulveda is a 67 y.o. female with HTN, HLP, CAD s/p PCI and h/o ischemic cardiomyopathy (EF now normal) who is presenting to clinic for follow-up. She last saw Dr. Perales in February 2017. Has been doing well overall. No chest discomfort. Has been working in the yard regularly. No other exercise.    She quit her aspirin about 1 year ago because of significant bruising. No bleeding issues in the past.     Her BP is usually 140s/80s. Adds a lot of salt to all her meals. Has tried to avoid salt but has been unsuccessful.     Had some bradycardia for which she underwent an exercise treadmill test which demonstrated chrontoropic competence.     Past Medical History:   Diagnosis Date   • Coronary artery disease involving native coronary artery of native heart without angina pectoris 2/15/2017   • Dyslipidemia 2/15/2017   • Essential hypertension, benign 2/15/2017   • Hypertension    • Ischemic cardiomyopathy 10/7/2015   • Myocardial infarct    • Rectal cancer (CMS-HCC)     10 years ago   • S/P coronary artery stent placement 2/15/2017     Past Surgical History:   Procedure Laterality Date   • STENT PLACEMENT      left groin     History reviewed. No pertinent family history.  History   Smoking Status   • Former Smoker   • Packs/day: 1.00   • Years: 20.00   • Types: Cigarettes   • Quit date: 9/30/2015   Smokeless Tobacco   • Never Used     No Known Allergies  Outpatient Encounter Prescriptions as of 11/16/2017   Medication Sig Dispense Refill   • lisinopril (PRINIVIL, ZESTRIL) 40 MG tablet Take 1 Tab by mouth every day. 90 Tab 3   • aspirin EC (ECOTRIN) 81 MG Tablet Delayed Response Take 1 Tab by mouth every day. 30 Tab     "  • hydrochlorothiazide (HYDRODIURIL) 12.5 MG tablet Take 1 Tab by mouth every day. 30 Tab 11   • amlodipine (NORVASC) 5 MG Tab Take 5 mg by mouth every day.     • atorvastatin (LIPITOR) 80 MG tablet Take 1 Tab by mouth every day. 30 Tab 11   • [DISCONTINUED] predniSONE (DELTASONE) 20 MG Tab 1 TAB ONCE A DAY ONLY IF NEEDED FOR COUGH. TAKE WITH FOOD. 7 Tab 0   • [DISCONTINUED] azithromycin (ZITHROMAX) 250 MG Tab 2 TABS ON DAY 1, 1 TAB ON DAYS 2-5. 6 Tab 0   • [DISCONTINUED] clopidogrel (PLAVIX) 75 MG Tab Take 75 mg by mouth every day.     • nitroglycerin (NITROSTAT) 0.4 MG SL Tab Place 1 Tab under tongue as needed for Chest Pain. 25 Tab 0   • [DISCONTINUED] aspirin (ASA) 325 MG Tab Take 1 Tab by mouth every day. 100 Tab 3   • [DISCONTINUED] lisinopril (PRINIVIL) 10 MG Tab Take 1 Tab by mouth every day. 30 Tab 0     No facility-administered encounter medications on file as of 11/16/2017.      Review of Systems   Constitutional: Negative for malaise/fatigue.   HENT: Negative.    Respiratory: Negative for shortness of breath.    Cardiovascular: Negative for chest pain, palpitations, orthopnea, leg swelling and PND.   Gastrointestinal: Negative for abdominal pain.   Musculoskeletal: Negative for falls.   Skin: Negative.    Neurological: Negative for dizziness and loss of consciousness.   Endo/Heme/Allergies: Does not bruise/bleed easily.   Psychiatric/Behavioral: Negative for depression.   All other systems reviewed and are negative.       Objective:   /90   Pulse 63   Ht 1.676 m (5' 6\")   Wt 67.6 kg (149 lb)   SpO2 95%   BMI 24.05 kg/m²      Physical Exam   Constitutional: She is oriented to person, place, and time. No distress.   HENT:   Head: Normocephalic and atraumatic.   Eyes: Conjunctivae are normal.   Neck: Normal range of motion. Neck supple. No JVD present.   Cardiovascular: Normal rate, regular rhythm and normal heart sounds.  Exam reveals no gallop and no friction rub.    No murmur " heard.  Pulmonary/Chest: Effort normal and breath sounds normal. No respiratory distress. She has no wheezes. She has no rales.   Abdominal: Soft. There is no tenderness.   Musculoskeletal: She exhibits no edema.   Neurological: She is alert and oriented to person, place, and time.   Skin: Skin is warm and dry. She is not diaphoretic.   Psychiatric: She has a normal mood and affect.   Nursing note and vitals reviewed.      Echo in Feb 2017 was reviewed and showed  EF 65%. Grade I diastolic dysfunction. RVSP 25mmHg. Aortic sclerosis    Labs in Nov 2017 was performed  LDL 52.     Cardiac cath in October 2015.   POSTPROCEDURE DIAGNOSES:  1.  Three-vessel coronary artery disease with high-grade mid-left anterior   descending artery stenosis, high-grade proximal superior branch obtuse marginal   stenosis, high-grade ostial inferior branch obtuse marginal branch stenosis and  nonobstructive proximal posterior descending artery stenosis.  2.  Successful percutaneous transluminal coronary angioplasty/stent placement   of the mid left anterior descending artery with 2.5x15 mm Xience Alpine   drug-eluting stent.  3.  Successful percutaneous transluminal coronary angioplasty/stent placement   of the proximal superior branch of the obtuse marginal artery with 2.5x15 mm   Xience Alpine drug-eluting stent.  4.  Successful percutaneous transluminal coronary angioplasty/stent placement   of the ostial inferior branch of the obtuse marginal artery with 2.5x18 mm   Xience Alpine drug-eluting stent.  5.  Reduced left ventricular systolic function with ejection fraction of 35%,   anterior apical akinesis.  6.  Mildly elevated left ventricular end-diastolic pressure.  7.  Successful Angio-Seal closure.    Assessment:     1. Essential hypertension, benign  BASIC METABOLIC PANEL   2. Coronary artery disease involving native coronary artery of native heart without angina pectoris     3. Dyslipidemia         Medical Decision Making:   Today's Assessment / Status / Plan:     Hypertension - her BP is not at goal. Advised watching her salt intake, but patient reports having tried previously and failing. She is already on max dose of lisinopril. Continue norvasc at current dose. Start HCTZ 12.5mg daily. Chem-7 in about 2 weeks to evaluate renal function.     CAD - s/p multi-vessel PCI as detailed above. Patient should ideally be on lifelong aspirin. We discussed the risks and benefits of aspirin use and patient is agreeable to starting asprin 81mg daily. Continue lipitor.     Hyperlipidemia - repeat lipid panel as above. Continue lipitor at current dose.     Return to clinic in 3 months or earlier if needed.    Thank you for allowing me to participate in the care of this patient. Please do not hesitate to contact me with any questions.    Patricia Carrington MD  Cardiologist  [facility]      PLEASE NOTE: This dictation was created using voice recognition software.             Kasandra Sinha M.D.  [id number] [facility] [address]  VIA Facsimile: [phone]

## 2018-02-27 ENCOUNTER — OFFICE VISIT (OUTPATIENT)
Dept: CARDIOLOGY | Facility: CLINIC | Age: 68
End: 2018-02-27
Payer: MEDICARE

## 2018-02-27 VITALS
HEIGHT: 66 IN | DIASTOLIC BLOOD PRESSURE: 80 MMHG | SYSTOLIC BLOOD PRESSURE: 150 MMHG | BODY MASS INDEX: 23.78 KG/M2 | HEART RATE: 58 BPM | WEIGHT: 148 LBS | OXYGEN SATURATION: 92 %

## 2018-02-27 DIAGNOSIS — E78.5 DYSLIPIDEMIA: ICD-10-CM

## 2018-02-27 DIAGNOSIS — I10 ESSENTIAL HYPERTENSION, BENIGN: ICD-10-CM

## 2018-02-27 DIAGNOSIS — I25.10 CORONARY ARTERY DISEASE INVOLVING NATIVE CORONARY ARTERY OF NATIVE HEART WITHOUT ANGINA PECTORIS: ICD-10-CM

## 2018-02-27 DIAGNOSIS — I25.5 ISCHEMIC CARDIOMYOPATHY: ICD-10-CM

## 2018-02-27 PROCEDURE — 99214 OFFICE O/P EST MOD 30 MIN: CPT | Performed by: INTERNAL MEDICINE

## 2018-02-27 RX ORDER — HYDROCHLOROTHIAZIDE 12.5 MG/1
12.5 TABLET ORAL DAILY
Qty: 90 TAB | Refills: 3 | Status: ON HOLD | OUTPATIENT
Start: 2018-02-27 | End: 2022-05-11

## 2018-02-27 RX ORDER — ATORVASTATIN CALCIUM 80 MG/1
80 TABLET, FILM COATED ORAL DAILY
Qty: 90 TAB | Refills: 3 | Status: SHIPPED | OUTPATIENT
Start: 2018-02-27 | End: 2018-06-27 | Stop reason: SDUPTHER

## 2018-02-27 RX ORDER — AMLODIPINE BESYLATE 5 MG/1
5 TABLET ORAL DAILY
Qty: 90 TAB | Refills: 3 | Status: SHIPPED | OUTPATIENT
Start: 2018-02-27 | End: 2018-06-27 | Stop reason: SDUPTHER

## 2018-02-27 RX ORDER — LISINOPRIL 40 MG/1
40 TABLET ORAL DAILY
Qty: 90 TAB | Refills: 3 | Status: SHIPPED | OUTPATIENT
Start: 2018-02-27 | End: 2019-03-06 | Stop reason: SDUPTHER

## 2018-02-27 ASSESSMENT — ENCOUNTER SYMPTOMS
DIZZINESS: 0
ABDOMINAL PAIN: 0
PALPITATIONS: 0
PND: 0
SHORTNESS OF BREATH: 0
LOSS OF CONSCIOUSNESS: 0
FALLS: 0
DEPRESSION: 0
ORTHOPNEA: 0

## 2018-02-27 NOTE — PROGRESS NOTES
Subjective:   Ashlee Sepulveda is a 67 y.o. female who is presenting to clinic for follow-up on her coronary artery disease. She has been free of anginal symptoms. Denies any chest discomfort or dyspnea. Has not been exercising over the winter months. Otherwise likes to work in her yard regularly. continues to abstain from tobacco. Has been compliant with her aspirin.    Her blood pressure is usually well controlled, systolic 120s to 130s. Has been avoiding salt in her meals. Has been compliant with all of her medications.    She has hyperlipidemia and has been tolerating her Lipitor well without any side effects.    Patient has a history of ischemic cardiomyopathy. Her LV systolic function has now resolved. Denies any fluid overload symptoms.    Has a history of bradycardia. Patient previously underwent an exercise treadmill test which showed chronotropic incompetence.     Past Medical History:   Diagnosis Date   • Coronary artery disease involving native coronary artery of native heart without angina pectoris 2/15/2017   • Dyslipidemia 2/15/2017   • Essential hypertension, benign 2/15/2017   • Hypertension    • Ischemic cardiomyopathy 10/7/2015   • Myocardial infarct    • Rectal cancer (CMS-HCC)     10 years ago   • S/P coronary artery stent placement 2/15/2017     Past Surgical History:   Procedure Laterality Date   • STENT PLACEMENT      left groin     History reviewed. No pertinent family history.  History   Smoking Status   • Former Smoker   • Packs/day: 1.00   • Years: 20.00   • Types: Cigarettes   • Quit date: 9/30/2015   Smokeless Tobacco   • Never Used     No Known Allergies  Outpatient Encounter Prescriptions as of 2/27/2018   Medication Sig Dispense Refill   • atorvastatin (LIPITOR) 80 MG tablet Take 1 Tab by mouth every day. 90 Tab 3   • amLODIPine (NORVASC) 5 MG Tab Take 1 Tab by mouth every day. 90 Tab 3   • lisinopril (PRINIVIL, ZESTRIL) 40 MG tablet Take 1 Tab by mouth every day. 90 Tab 3   •  "hydroCHLOROthiazide (HYDRODIURIL) 12.5 MG tablet Take 1 Tab by mouth every day. 90 Tab 3   • aspirin EC (ECOTRIN) 81 MG Tablet Delayed Response Take 1 Tab by mouth every day. 30 Tab    • [DISCONTINUED] lisinopril (PRINIVIL, ZESTRIL) 40 MG tablet Take 1 Tab by mouth every day. 90 Tab 3   • [DISCONTINUED] hydrochlorothiazide (HYDRODIURIL) 12.5 MG tablet Take 1 Tab by mouth every day. 30 Tab 11   • [DISCONTINUED] amlodipine (NORVASC) 5 MG Tab Take 5 mg by mouth every day.     • [DISCONTINUED] atorvastatin (LIPITOR) 80 MG tablet Take 1 Tab by mouth every day. 30 Tab 11   • nitroglycerin (NITROSTAT) 0.4 MG SL Tab Place 1 Tab under tongue as needed for Chest Pain. 25 Tab 0     No facility-administered encounter medications on file as of 2/27/2018.      Review of Systems   Constitutional: Negative for malaise/fatigue.   Respiratory: Negative for shortness of breath.    Cardiovascular: Negative for chest pain, palpitations, orthopnea, leg swelling and PND.   Gastrointestinal: Negative for abdominal pain.   Genitourinary: Negative for hematuria.   Musculoskeletal: Negative for falls.   Neurological: Negative for dizziness and loss of consciousness.   Psychiatric/Behavioral: Negative for depression.   All other systems reviewed and are negative.       Objective:   /80   Pulse (!) 58   Ht 1.676 m (5' 6\")   Wt 67.1 kg (148 lb)   SpO2 92%   BMI 23.89 kg/m²     Physical Exam   Constitutional: She is oriented to person, place, and time. No distress.   HENT:   Head: Normocephalic and atraumatic.   Eyes: Conjunctivae are normal.   Neck: Normal range of motion. Neck supple. No JVD present.   Cardiovascular: Normal rate, regular rhythm and normal heart sounds.  Exam reveals no gallop and no friction rub.    No murmur heard.  Pulmonary/Chest: Effort normal and breath sounds normal. No respiratory distress. She has no wheezes. She has no rales.   Abdominal: Soft. There is no tenderness.   Musculoskeletal: She exhibits no " edema.   Neurological: She is alert and oriented to person, place, and time.   Skin: Skin is warm and dry. She is not diaphoretic.   Psychiatric: She has a normal mood and affect.   Nursing note and vitals reviewed.    Prior cardiac testing:  Echocardiogram in February 2017 showed a normal LV systolic function with an ejection fraction of 65%.    Cardiac catheterization in 2015 showed three-vessel coronary artery disease. Status post PCI to the mid LAD and 2 branches of the obtuse marginal artery. Ejection fraction at that time was 35%.    -----------------------    Labs performed in January 2018 were reviewed and showed creatinine 1.0. Potassium 3.6.    Assessment:     1. Coronary artery disease involving native coronary artery of native heart without angina pectoris  atorvastatin (LIPITOR) 80 MG tablet   2. Ischemic cardiomyopathy  atorvastatin (LIPITOR) 80 MG tablet   3. Dyslipidemia  atorvastatin (LIPITOR) 80 MG tablet    LIPID PROFILE   4. Essential hypertension, benign         Medical Decision Making:  Today's Assessment / Status / Plan:     Coronary artery disease: Status post multivessel PCI as noted above. Patient has been free of anginal symptoms. Continue aspirin and Lipitor. Blood pressure management as below.    Hypertension: Blood pressure is not at goal today. However patient's blood pressure has been well-controlled usually. For now I have advised the patient to continue to monitor her blood pressures. Should her blood pressures consistently stays elevated like they are today, she will let us know. Otherwise continue current medication regimen.    Hyperlipidemia: Patient has been tolerating the Lipitor well. Repeat lipid panel has been ordered today.    History of ischemic cardiomyopathy: LV systolic function has now normalized. She is not fluid overloaded on exam. Continue lisinopril at current dose. She is currently not on any beta blockers due to bradycardia.    Return to clinic in 6 months or  earlier if needed.    Thank you for allowing me to participate in the care of this patient. Please do not hesitate to contact me with any questions.    Patricia Carrington MD  Cardiologist  [facility]      PLEASE NOTE: This dictation was created using voice recognition software.

## 2018-02-27 NOTE — LETTER
[facility] [address],   [address]nd [place] [address]  Phone: [phone]  Fax: [phone]              Ashlee Sepulveda  1950    Encounter Date: 2/27/2018    Patricia Carrington M.D.          PROGRESS NOTE:  Subjective:   Ashlee Sepulveda is a 67 y.o. female who is presenting to clinic for follow-up on her coronary artery disease. She has been free of anginal symptoms. Denies any chest discomfort or dyspnea. Has not been exercising over the winter months. Otherwise likes to work in her yard regularly. continues to abstain from tobacco. Has been compliant with her aspirin.    Her blood pressure is usually well controlled, systolic 120s to 130s. Has been avoiding salt in her meals. Has been compliant with all of her medications.    She has hyperlipidemia and has been tolerating her Lipitor well without any side effects.    Patient has a history of ischemic cardiomyopathy. Her LV systolic function has now resolved. Denies any fluid overload symptoms.    Has a history of bradycardia. Patient previously underwent an exercise treadmill test which showed chronotropic incompetence.     Past Medical History:   Diagnosis Date   • Coronary artery disease involving native coronary artery of native heart without angina pectoris 2/15/2017   • Dyslipidemia 2/15/2017   • Essential hypertension, benign 2/15/2017   • Hypertension    • Ischemic cardiomyopathy 10/7/2015   • Myocardial infarct    • Rectal cancer (CMS-HCC)     10 years ago   • S/P coronary artery stent placement 2/15/2017     Past Surgical History:   Procedure Laterality Date   • STENT PLACEMENT      left groin     History reviewed. No pertinent family history.  History   Smoking Status   • Former Smoker   • Packs/day: 1.00   • Years: 20.00   • Types: Cigarettes   • Quit date: 9/30/2015   Smokeless Tobacco   • Never Used     No Known Allergies  Outpatient Encounter Prescriptions as of 2/27/2018    "  Medication Sig Dispense Refill   • atorvastatin (LIPITOR) 80 MG tablet Take 1 Tab by mouth every day. 90 Tab 3   • amLODIPine (NORVASC) 5 MG Tab Take 1 Tab by mouth every day. 90 Tab 3   • lisinopril (PRINIVIL, ZESTRIL) 40 MG tablet Take 1 Tab by mouth every day. 90 Tab 3   • hydroCHLOROthiazide (HYDRODIURIL) 12.5 MG tablet Take 1 Tab by mouth every day. 90 Tab 3   • aspirin EC (ECOTRIN) 81 MG Tablet Delayed Response Take 1 Tab by mouth every day. 30 Tab    • [DISCONTINUED] lisinopril (PRINIVIL, ZESTRIL) 40 MG tablet Take 1 Tab by mouth every day. 90 Tab 3   • [DISCONTINUED] hydrochlorothiazide (HYDRODIURIL) 12.5 MG tablet Take 1 Tab by mouth every day. 30 Tab 11   • [DISCONTINUED] amlodipine (NORVASC) 5 MG Tab Take 5 mg by mouth every day.     • [DISCONTINUED] atorvastatin (LIPITOR) 80 MG tablet Take 1 Tab by mouth every day. 30 Tab 11   • nitroglycerin (NITROSTAT) 0.4 MG SL Tab Place 1 Tab under tongue as needed for Chest Pain. 25 Tab 0     No facility-administered encounter medications on file as of 2/27/2018.      Review of Systems   Constitutional: Negative for malaise/fatigue.   Respiratory: Negative for shortness of breath.    Cardiovascular: Negative for chest pain, palpitations, orthopnea, leg swelling and PND.   Gastrointestinal: Negative for abdominal pain.   Genitourinary: Negative for hematuria.   Musculoskeletal: Negative for falls.   Neurological: Negative for dizziness and loss of consciousness.   Psychiatric/Behavioral: Negative for depression.   All other systems reviewed and are negative.       Objective:   /80   Pulse (!) 58   Ht 1.676 m (5' 6\")   Wt 67.1 kg (148 lb)   SpO2 92%   BMI 23.89 kg/m²      Physical Exam   Constitutional: She is oriented to person, place, and time. No distress.   HENT:   Head: Normocephalic and atraumatic.   Eyes: Conjunctivae are normal.   Neck: Normal range of motion. Neck supple. No JVD present.   Cardiovascular: Normal rate, regular rhythm and normal " heart sounds.  Exam reveals no gallop and no friction rub.    No murmur heard.  Pulmonary/Chest: Effort normal and breath sounds normal. No respiratory distress. She has no wheezes. She has no rales.   Abdominal: Soft. There is no tenderness.   Musculoskeletal: She exhibits no edema.   Neurological: She is alert and oriented to person, place, and time.   Skin: Skin is warm and dry. She is not diaphoretic.   Psychiatric: She has a normal mood and affect.   Nursing note and vitals reviewed.    Prior cardiac testing:  Echocardiogram in February 2017 showed a normal LV systolic function with an ejection fraction of 65%.    Cardiac catheterization in 2015 showed three-vessel coronary artery disease. Status post PCI to the mid LAD and 2 branches of the obtuse marginal artery. Ejection fraction at that time was 35%.    -----------------------    Labs performed in January 2018 were reviewed and showed creatinine 1.0. Potassium 3.6.    Assessment:     1. Coronary artery disease involving native coronary artery of native heart without angina pectoris  atorvastatin (LIPITOR) 80 MG tablet   2. Ischemic cardiomyopathy  atorvastatin (LIPITOR) 80 MG tablet   3. Dyslipidemia  atorvastatin (LIPITOR) 80 MG tablet    LIPID PROFILE   4. Essential hypertension, benign         Medical Decision Making:  Today's Assessment / Status / Plan:     Coronary artery disease: Status post multivessel PCI as noted above. Patient has been free of anginal symptoms. Continue aspirin and Lipitor. Blood pressure management as below.    Hypertension: Blood pressure is not at goal today. However patient's blood pressure has been well-controlled usually. For now I have advised the patient to continue to monitor her blood pressures. Should her blood pressures consistently stays elevated like they are today, she will let us know. Otherwise continue current medication regimen.    Hyperlipidemia: Patient has been tolerating the Lipitor well. Repeat lipid panel  has been ordered today.    History of ischemic cardiomyopathy: LV systolic function has now normalized. She is not fluid overloaded on exam. Continue lisinopril at current dose. She is currently not on any beta blockers due to bradycardia.    Return to clinic in 6 months or earlier if needed.    Thank you for allowing me to participate in the care of this patient. Please do not hesitate to contact me with any questions.    Patricia Carrington MD  Cardiologist  [facility]      PLEASE NOTE: This dictation was created using voice recognition software.             Kasandra Sinha M.D.  [address] [facility] [address]  VIA Facsimile: [phone]

## 2018-06-27 DIAGNOSIS — I25.10 CORONARY ARTERY DISEASE INVOLVING NATIVE CORONARY ARTERY OF NATIVE HEART WITHOUT ANGINA PECTORIS: ICD-10-CM

## 2018-06-27 DIAGNOSIS — I25.5 ISCHEMIC CARDIOMYOPATHY: ICD-10-CM

## 2018-06-27 DIAGNOSIS — E78.5 DYSLIPIDEMIA: ICD-10-CM

## 2018-06-29 RX ORDER — AMLODIPINE BESYLATE 5 MG/1
5 TABLET ORAL DAILY
Qty: 90 TAB | Refills: 3 | Status: SHIPPED | OUTPATIENT
Start: 2018-06-29 | End: 2019-07-08 | Stop reason: SDUPTHER

## 2018-06-29 RX ORDER — ATORVASTATIN CALCIUM 80 MG/1
80 TABLET, FILM COATED ORAL DAILY
Qty: 90 TAB | Refills: 3 | Status: SHIPPED | OUTPATIENT
Start: 2018-06-29 | End: 2019-07-25

## 2018-11-30 ENCOUNTER — TELEPHONE (OUTPATIENT)
Dept: CARDIOLOGY | Facility: CLINIC | Age: 68
End: 2018-11-30

## 2018-12-11 DIAGNOSIS — E78.5 DYSLIPIDEMIA: ICD-10-CM

## 2018-12-19 ENCOUNTER — TELEPHONE (OUTPATIENT)
Dept: CARDIOLOGY | Facility: MEDICAL CENTER | Age: 68
End: 2018-12-19

## 2018-12-19 ENCOUNTER — OFFICE VISIT (OUTPATIENT)
Dept: CARDIOLOGY | Facility: MEDICAL CENTER | Age: 68
End: 2018-12-19
Payer: MEDICARE

## 2018-12-19 VITALS
DIASTOLIC BLOOD PRESSURE: 60 MMHG | OXYGEN SATURATION: 91 % | HEART RATE: 68 BPM | SYSTOLIC BLOOD PRESSURE: 122 MMHG | WEIGHT: 151 LBS | HEIGHT: 66 IN | BODY MASS INDEX: 24.27 KG/M2

## 2018-12-19 DIAGNOSIS — I10 ESSENTIAL HYPERTENSION, BENIGN: ICD-10-CM

## 2018-12-19 DIAGNOSIS — Z01.810 PRE-OPERATIVE CARDIOVASCULAR EXAMINATION: ICD-10-CM

## 2018-12-19 DIAGNOSIS — E78.5 DYSLIPIDEMIA: ICD-10-CM

## 2018-12-19 DIAGNOSIS — I73.9 PVD (PERIPHERAL VASCULAR DISEASE) (HCC): ICD-10-CM

## 2018-12-19 DIAGNOSIS — F17.210 HEAVY CIGARETTE SMOKER (20-39 PER DAY): ICD-10-CM

## 2018-12-19 DIAGNOSIS — I25.10 CORONARY ARTERY DISEASE INVOLVING NATIVE CORONARY ARTERY OF NATIVE HEART WITHOUT ANGINA PECTORIS: ICD-10-CM

## 2018-12-19 PROCEDURE — 99214 OFFICE O/P EST MOD 30 MIN: CPT | Mod: 25 | Performed by: INTERNAL MEDICINE

## 2018-12-19 PROCEDURE — 99406 BEHAV CHNG SMOKING 3-10 MIN: CPT | Performed by: INTERNAL MEDICINE

## 2018-12-19 RX ORDER — NICOTINE 21 MG/24HR
1 PATCH, TRANSDERMAL 24 HOURS TRANSDERMAL EVERY 24 HOURS
Qty: 14 PATCH | Refills: 0 | Status: SHIPPED | OUTPATIENT
Start: 2018-12-19 | End: 2019-07-25

## 2018-12-19 RX ORDER — NICOTINE 21 MG/24HR
1 PATCH, TRANSDERMAL 24 HOURS TRANSDERMAL EVERY 24 HOURS
Qty: 14 PATCH | Refills: 0 | Status: SHIPPED | OUTPATIENT
Start: 2018-12-19 | End: 2019-01-31

## 2018-12-19 NOTE — Clinical Note
Can we call Dr. Abraham's office later today or tomorrow and ensure he gets my note?  And also make sure they know I recommend she remains on aspirin and I told the patient this.  If they truly want her to hold it, they will need to contact her again (or we can). Thanks!

## 2018-12-19 NOTE — PATIENT INSTRUCTIONS
Please start your nicotine patch 21mg/day once a day today.    After two weeks, please switch to 14mg/day patches once a day.    After another two weeks, please switch to 7mg/day patches once a day.    After another two weeks (6 weeks from your quit date).  You should be off the patches.  Please call our clinic if you need additional assistance such as nicotine gum or other help.

## 2018-12-19 NOTE — PROGRESS NOTES
Cardiology Follow-up Consultation Note    Date of note:    12/19/2018    Primary Care Provider: Kasandra Sinha M.D.  Referring Provider: Patricia Carrington M.D.     Patient Name: Ashlee Sepulveda     YOB: 1950  MRN:              [id number]    Chief Complaint: pre-operative evaluation     History of Present Illness: Ashlee Sepulveda is a 68 y.o. female whose current medical problems include ischemic cardiomyopathy with recovered LVEF, PVD s/p femoro-femoral bypass graft and previous CEA, CAD with previous STEMI s/p multi-vessel PCI in 2015, dyslipdemia, and hypertension who is here for pre-operative evaluation.     Last seen by Dr. Carrington on 2/27/2018.    Interim Events:  She is having severe claudication and is pending revascularization surgery in her legs with Dr. Abraham.  Reading his note, it appears she has critical limb ischemia in her right leg.     Has not had any chest pain since her surgery. Walking limited by hip and leg pain, can not walk up a flight of stairs.     She continues to smoke one pack a day. She is interested in quitting.   In terms of hypertension, well controlled. It is unclear if she is taking norvasc.     Patient denies chest pain, palpitations, dyspnea on exertion, pre-syncope, syncope, lower extremity swelling, orthopnea, PND or recent weight gain.       ROS  Constitution: Negative for chills, fever and night sweats.   HENT: Negative for nosebleeds.    Eyes: Negative for vision loss in left eye and vision loss in right eye.   Respiratory: Negative for hemoptysis.    Gastrointestinal: Negative for hematemesis, hematochezia and melena.   Genitourinary: Negative for hematuria.   Neurological: Negative for focal weakness, numbness and paresthesias.         Past Medical History:   Diagnosis Date   • Coronary artery disease involving native coronary artery of native heart without angina pectoris 2/15/2017   • Dyslipidemia 2/15/2017   • Essential hypertension, benign 2/15/2017   •  "Hypertension    • Ischemic cardiomyopathy 10/7/2015   • Myocardial infarct (HCC)    • Rectal cancer (HCC)     10 years ago   • S/P coronary artery stent placement 2/15/2017         Past Surgical History:   Procedure Laterality Date   • STENT PLACEMENT      left groin         Current Outpatient Prescriptions   Medication Sig Dispense Refill   • atorvastatin (LIPITOR) 80 MG tablet Take 1 Tab by mouth every day. 90 Tab 3   • lisinopril (PRINIVIL, ZESTRIL) 40 MG tablet Take 1 Tab by mouth every day. 90 Tab 3   • hydroCHLOROthiazide (HYDRODIURIL) 12.5 MG tablet Take 1 Tab by mouth every day. 90 Tab 3   • aspirin EC (ECOTRIN) 81 MG Tablet Delayed Response Take 1 Tab by mouth every day. 30 Tab    • amLODIPine (NORVASC) 5 MG Tab Take 1 Tab by mouth every day. 90 Tab 3   • nitroglycerin (NITROSTAT) 0.4 MG SL Tab Place 1 Tab under tongue as needed for Chest Pain. 25 Tab 0     No current facility-administered medications for this visit.          No Known Allergies      Family History   Problem Relation Age of Onset   • Heart Attack Mother          Social History     Social History   • Marital status:      Spouse name: N/A   • Number of children: N/A   • Years of education: N/A     Occupational History   • Not on file.     Social History Main Topics   • Smoking status: Former Smoker     Packs/day: 1.00     Years: 20.00     Types: Cigarettes     Quit date: 9/30/2015   • Smokeless tobacco: Never Used   • Alcohol use Yes      Comment: occ - once a month or less   • Drug use: No   • Sexual activity: Not on file     Other Topics Concern   • Not on file     Social History Narrative   • No narrative on file         Physical Exam:  Ambulatory Vitals  Blood pressure 122/60, pulse 68, height 1.676 m (5' 6\"), weight 68.5 kg (151 lb), SpO2 91 %.   Oxygen Therapy:  Pulse Oximetry: 91 %  BP Readings from Last 4 Encounters:   12/19/18 122/60   02/27/18 150/80   11/16/17 160/90   11/03/17 150/82       Weight/BMI: Body mass index is " 24.37 kg/m².  Wt Readings from Last 4 Encounters:   12/19/18 68.5 kg (151 lb)   02/27/18 67.1 kg (148 lb)   11/16/17 67.6 kg (149 lb)   11/03/17 67.3 kg (148 lb 6.4 oz)       General: No apparent distress  Eyes: nl conjunctiva  ENT: OP clear, normal external appearance of nose and ears  Neck: JVP 4 cm H2O, no carotid bruits  Lungs: normal respiratory effort, CTAB  Heart: RRR, no murmurs, no rubs or gallops, trace edema bilateral lower extremities. No LV/RV heave on cardiac palpatation. 2+ bilateral radial pulses.  1+ left dp pulse, no palpable right dp pulse   Abdomen: soft, non tender, non distended, no masses, normal bowel sounds.  No HSM.  Extremities/MSK: no clubbing, no cyanosis  Neurological: No focal sensory deficits  Psychiatric: Appropriate affect, A/O x 3, intact judgement and insight  Skin: Warm extremities    Lab Data Review:  Lab Results   Component Value Date/Time    CHOLSTRLTOT 209 (H) 09/30/2015 11:00 PM     (H) 09/30/2015 11:00 PM    HDL 52 09/30/2015 11:00 PM    TRIGLYCERIDE 121 09/30/2015 11:00 PM       Lab Results   Component Value Date/Time    SODIUM 138 10/02/2015 02:15 AM    POTASSIUM 3.5 (L) 10/02/2015 02:15 AM    CHLORIDE 110 10/02/2015 02:15 AM    CO2 21 10/02/2015 02:15 AM    GLUCOSE 94 10/02/2015 02:15 AM    BUN 10 10/02/2015 02:15 AM    CREATININE 0.61 10/02/2015 02:15 AM     Lab Results   Component Value Date/Time    ALKPHOSPHAT 85 09/30/2015 11:00 PM    ASTSGOT 35 09/30/2015 11:00 PM    ALTSGPT 21 09/30/2015 11:00 PM    TBILIRUBIN 0.4 09/30/2015 11:00 PM      Lab Results   Component Value Date/Time    WBC 15.2 (H) 10/02/2015 02:15 AM     No components found for: HBGA1C  No components found for: TROPONIN  No components found for: BNP    OSH labs 12/14/2018  Creatinine 1.4  eGFR 38  LDL 71  HDL 70    tg 98        Cardiac Imaging and Procedures Review:    EKG dated 12/14/2018 : My personal interpretation is sinus bradycardia, low voltage, non-specific ST changes.     Echo  dated 2/28/2017:   LVEF 65%, grade I DD, no valvular disease.     [facility] (10/2015):   DATE OF PROCEDURE:  10/01/2015     REFERRING PHYSICIAN:  Dr. Ammon López and Dr. Rox Contreras.     PROCEDURE:  1.  Left heart catheterization.  2.  Coronary angiography.  3.  PTCA/stent placement of the mid left anterior descending artery.  4.  PTCA/stent placement of the proximal superior limb of the obtuse marginal artery.  5.  PTCA/stent placement of the ostial inferior branch of the obtuse marginal artery.  6.  Angio-Seal closure.     PREPROCEDURE DIAGNOSIS:    1. Acute anterolateral myocardial infarction, status post  lytic therapy.     POSTPROCEDURE DIAGNOSES:  1.  Three-vessel coronary artery disease with high-grade mid-left anterior   descending artery stenosis, high-grade proximal superior branch obtuse marginal   stenosis, high-grade ostial inferior branch obtuse marginal branch stenosis and  nonobstructive proximal posterior descending artery stenosis.  2.  Successful percutaneous transluminal coronary angioplasty/stent placement   of the mid left anterior descending artery with 2.5x15 mm Xience Alpine   drug-eluting stent.  3.  Successful percutaneous transluminal coronary angioplasty/stent placement   of the proximal superior branch of the obtuse marginal artery with 2.5x15 mm   Xience Alpine drug-eluting stent.  4.  Successful percutaneous transluminal coronary angioplasty/stent placement   of the ostial inferior branch of the obtuse marginal artery with 2.5x18 mm   Xience Alpine drug-eluting stent.  5.  Reduced left ventricular systolic function with ejection fraction of 35%,   anterior apical akinesis.  6.  Mildly elevated left ventricular end-diastolic pressure.  7.  Successful Angio-Seal closure.     INDICATION:  The patient is a 65-year-old female with current tobacco abuse.    She was diagnosed with acute anterolateral myocardial infarction and received   lytic therapy yesterday.  She was scheduled for cardiac  catheterization.    Radiology test Review:  CXR: 2015  1.  Enlarged cardiac silhouette  2.  Atherosclerosis  3.  Central vascular congestion and interstitial pulmonary edema      Medical Decision Makin. Essential hypertension, benign  Well controlled    2. Dyslipidemia  Well controlled on lipitor    3. Coronary artery disease involving native coronary artery of native heart without angina pectoris  Aspirin, lipitor    4. Heavy cigarette smoker (20-39 per day)  I spent approximately 7 minutes discussing the side effects and consequences of active smoking and our treatment plan going forward.   We discussed the pathophysiology of smoking and the effects on the cardiovascular and pulmonary systems.  We discussed setting a quit date of today.  We also discussed pharmaco therapies, including a nicotine patch taper.  I asked her to contact me if she needs assistance.      5. PVD (peripheral vascular disease) (HCC)  Now with critical leg ischemia.      6. Pre-operative cardiovascular examination  Patient is moderate to risk of cardiovascular complications for high risk surgery.  One of her main risk factors is smoking and I said she absolutely needs to quit prior to her surgery to minimize her cardiac risk.  She is otherwise medically optimized and surgery should proceed without further cardiac work-up as we would not delay this relatively urgent surgery for revascularization of an asymptomatic ischemia.  She was recently revascularized making her risk lower than average for this type of patient.   Aspirin should be continued throughout the surgical period if possible due to her known history of coronary artery disease. Will not start kellie-operative beta-blockade as her surgery is in less than a week.           Return if symptoms worsen or fail to improve. F/u with Dr. Carrington as scheduled. i      Yifan Tinoco MD, [facility]  [facility], [facility].  [address] [place] [address]  [place] [address]  Phone: [phone]  Fax: [phone]

## 2018-12-19 NOTE — LETTER
[facility]   [address]  [place] [address]  Phone: [phone]  Fax: [phone]              Ashlee Sepulveda  1950    Encounter Date: 12/19/2018    Yifan Tinoco M.D.          PROGRESS NOTE:      Cardiology Follow-up Consultation Note    Date of note:    12/19/2018    Primary Care Provider: Kasandra Sinha M.D.  Referring Provider: Patricia Carrington M.D.     Patient Name: Ashlee Sepulveda     YOB: 1950  MRN:              [id number]    Chief Complaint: pre-operative evaluation     History of Present Illness: Ashlee Sepulveda is a 68 y.o. female whose current medical problems include ischemic cardiomyopathy with recovered LVEF, PVD s/p femoro-femoral bypass graft and previous CEA, CAD with previous STEMI s/p multi-vessel PCI in 2015, dyslipdemia, and hypertension who is here for pre-operative evaluation.     Last seen by Dr. Carrington on 2/27/2018.    Interim Events:  She is having severe claudication and is pending revascularization surgery in her legs with Dr. Abraham.  Reading his note, it appears she has critical limb ischemia in her right leg.     Has not had any chest pain since her surgery. Walking limited by hip and leg pain, can not walk up a flight of stairs.     She continues to smoke one pack a day. She is interested in quitting.   In terms of hypertension, well controlled. It is unclear if she is taking norvasc.     Patient denies chest pain, palpitations, dyspnea on exertion, pre-syncope, syncope, lower extremity swelling, orthopnea, PND or recent weight gain.       ROS  Constitution: Negative for chills, fever and night sweats.   HENT: Negative for nosebleeds.    Eyes: Negative for vision loss in left eye and vision loss in right eye.   Respiratory: Negative for hemoptysis.    Gastrointestinal: Negative for hematemesis, hematochezia and melena.   Genitourinary: Negative for hematuria.   Neurological: Negative for focal weakness,  numbness and paresthesias.         Past Medical History:   Diagnosis Date   • Coronary artery disease involving native coronary artery of native heart without angina pectoris 2/15/2017   • Dyslipidemia 2/15/2017   • Essential hypertension, benign 2/15/2017   • Hypertension    • Ischemic cardiomyopathy 10/7/2015   • Myocardial infarct (HCC)    • Rectal cancer (HCC)     10 years ago   • S/P coronary artery stent placement 2/15/2017         Past Surgical History:   Procedure Laterality Date   • STENT PLACEMENT      left groin         Current Outpatient Prescriptions   Medication Sig Dispense Refill   • atorvastatin (LIPITOR) 80 MG tablet Take 1 Tab by mouth every day. 90 Tab 3   • lisinopril (PRINIVIL, ZESTRIL) 40 MG tablet Take 1 Tab by mouth every day. 90 Tab 3   • hydroCHLOROthiazide (HYDRODIURIL) 12.5 MG tablet Take 1 Tab by mouth every day. 90 Tab 3   • aspirin EC (ECOTRIN) 81 MG Tablet Delayed Response Take 1 Tab by mouth every day. 30 Tab    • amLODIPine (NORVASC) 5 MG Tab Take 1 Tab by mouth every day. 90 Tab 3   • nitroglycerin (NITROSTAT) 0.4 MG SL Tab Place 1 Tab under tongue as needed for Chest Pain. 25 Tab 0     No current facility-administered medications for this visit.          No Known Allergies      Family History   Problem Relation Age of Onset   • Heart Attack Mother          Social History     Social History   • Marital status:      Spouse name: N/A   • Number of children: N/A   • Years of education: N/A     Occupational History   • Not on file.     Social History Main Topics   • Smoking status: Former Smoker     Packs/day: 1.00     Years: 20.00     Types: Cigarettes     Quit date: 9/30/2015   • Smokeless tobacco: Never Used   • Alcohol use Yes      Comment: occ - once a month or less   • Drug use: No   • Sexual activity: Not on file     Other Topics Concern   • Not on file     Social History Narrative   • No narrative on file         Physical Exam:  Ambulatory Vitals  Blood pressure 122/60,  "pulse 68, height 1.676 m (5' 6\"), weight 68.5 kg (151 lb), SpO2 91 %.   Oxygen Therapy:  Pulse Oximetry: 91 %  BP Readings from Last 4 Encounters:   12/19/18 122/60   02/27/18 150/80   11/16/17 160/90   11/03/17 150/82       Weight/BMI: Body mass index is 24.37 kg/m².  Wt Readings from Last 4 Encounters:   12/19/18 68.5 kg (151 lb)   02/27/18 67.1 kg (148 lb)   11/16/17 67.6 kg (149 lb)   11/03/17 67.3 kg (148 lb 6.4 oz)       General: No apparent distress  Eyes: nl conjunctiva  ENT: OP clear, normal external appearance of nose and ears  Neck: JVP 4 cm H2O, no carotid bruits  Lungs: normal respiratory effort, CTAB  Heart: RRR, no murmurs, no rubs or gallops, trace edema bilateral lower extremities. No LV/RV heave on cardiac palpatation. 2+ bilateral radial pulses.  1+ left dp pulse, no palpable right dp pulse   Abdomen: soft, non tender, non distended, no masses, normal bowel sounds.  No HSM.  Extremities/MSK: no clubbing, no cyanosis  Neurological: No focal sensory deficits  Psychiatric: Appropriate affect, A/O x 3, intact judgement and insight  Skin: Warm extremities    Lab Data Review:  Lab Results   Component Value Date/Time    CHOLSTRLTOT 209 (H) 09/30/2015 11:00 PM     (H) 09/30/2015 11:00 PM    HDL 52 09/30/2015 11:00 PM    TRIGLYCERIDE 121 09/30/2015 11:00 PM       Lab Results   Component Value Date/Time    SODIUM 138 10/02/2015 02:15 AM    POTASSIUM 3.5 (L) 10/02/2015 02:15 AM    CHLORIDE 110 10/02/2015 02:15 AM    CO2 21 10/02/2015 02:15 AM    GLUCOSE 94 10/02/2015 02:15 AM    BUN 10 10/02/2015 02:15 AM    CREATININE 0.61 10/02/2015 02:15 AM     Lab Results   Component Value Date/Time    ALKPHOSPHAT 85 09/30/2015 11:00 PM    ASTSGOT 35 09/30/2015 11:00 PM    ALTSGPT 21 09/30/2015 11:00 PM    TBILIRUBIN 0.4 09/30/2015 11:00 PM      Lab Results   Component Value Date/Time    WBC 15.2 (H) 10/02/2015 02:15 AM     No components found for: HBGA1C  No components found for: TROPONIN  No components found " for: BNP    OSH labs 12/14/2018  Creatinine 1.4  eGFR 38  LDL 71  HDL 70    tg 98        Cardiac Imaging and Procedures Review:    EKG dated 12/14/2018 : My personal interpretation is sinus bradycardia, low voltage, non-specific ST changes.     Echo dated 2/28/2017:   LVEF 65%, grade I DD, no valvular disease.     [facility] (10/2015):   DATE OF PROCEDURE:  10/01/2015     REFERRING PHYSICIAN:  Dr. Ammon López and Dr. Rox Contreras.     PROCEDURE:  1.  Left heart catheterization.  2.  Coronary angiography.  3.  PTCA/stent placement of the mid left anterior descending artery.  4.  PTCA/stent placement of the proximal superior limb of the obtuse marginal artery.  5.  PTCA/stent placement of the ostial inferior branch of the obtuse marginal artery.  6.  Angio-Seal closure.     PREPROCEDURE DIAGNOSIS:    1. Acute anterolateral myocardial infarction, status post  lytic therapy.     POSTPROCEDURE DIAGNOSES:  1.  Three-vessel coronary artery disease with high-grade mid-left anterior   descending artery stenosis, high-grade proximal superior branch obtuse marginal   stenosis, high-grade ostial inferior branch obtuse marginal branch stenosis and  nonobstructive proximal posterior descending artery stenosis.  2.  Successful percutaneous transluminal coronary angioplasty/stent placement   of the mid left anterior descending artery with 2.5x15 mm Xience Alpine   drug-eluting stent.  3.  Successful percutaneous transluminal coronary angioplasty/stent placement   of the proximal superior branch of the obtuse marginal artery with 2.5x15 mm   Xience Alpine drug-eluting stent.  4.  Successful percutaneous transluminal coronary angioplasty/stent placement   of the ostial inferior branch of the obtuse marginal artery with 2.5x18 mm   Xience Alpine drug-eluting stent.  5.  Reduced left ventricular systolic function with ejection fraction of 35%,   anterior apical akinesis.  6.  Mildly elevated left ventricular end-diastolic  pressure.  7.  Successful Angio-Seal closure.     INDICATION:  The patient is a 65-year-old female with current tobacco abuse.    She was diagnosed with acute anterolateral myocardial infarction and received   lytic therapy yesterday.  She was scheduled for cardiac catheterization.    Radiology test Review:  CXR: 2015  1.  Enlarged cardiac silhouette  2.  Atherosclerosis  3.  Central vascular congestion and interstitial pulmonary edema      Medical Decision Makin. Essential hypertension, benign  Well controlled    2. Dyslipidemia  Well controlled on lipitor    3. Coronary artery disease involving native coronary artery of native heart without angina pectoris  Aspirin, lipitor    4. Heavy cigarette smoker (20-39 per day)  I spent approximately 7 minutes discussing the side effects and consequences of active smoking and our treatment plan going forward.   We discussed the pathophysiology of smoking and the effects on the cardiovascular and pulmonary systems.  We discussed setting a quit date of today.  We also discussed pharmaco therapies, including a nicotine patch taper.  I asked her to contact me if she needs assistance.      5. PVD (peripheral vascular disease) (HCC)  Now with critical leg ischemia.      6. Pre-operative cardiovascular examination  Patient is moderate to risk of cardiovascular complications for high risk surgery.  One of her main risk factors is smoking and I said she absolutely needs to quit prior to her surgery to minimize her cardiac risk.  She is otherwise medically optimized and surgery should proceed without further cardiac work-up as we would not delay this relatively urgent surgery for revascularization of an asymptomatic ischemia.  She was recently revascularized making her risk lower than average for this type of patient.   Aspirin should be continued throughout the surgical period if possible due to her known history of coronary artery disease. Will not start kellie-operative  beta-blockade as her surgery is in less than a week.           Return if symptoms worsen or fail to improve. F/u with Dr. Carrington as scheduled. i      Yifan Tinoco MD, [facility], [address]  [address] [address]  Phone: [phone]  Fax: [phone]                  Kasandra Sinha M.D.  [facility] [facility] [address]  VIA Facsimile: [phone]

## 2018-12-20 NOTE — TELEPHONE ENCOUNTER
Yifan Tinoco M.D.  Devi Muñiz, RCHAUNCEY.             Can we call Dr. Abraham's office later today or tomorrow and ensure he gets my note?  And also make sure they know I recommend she remains on aspirin and I told the patient this.  If they truly want her to hold it, they will need to contact her again (or we can). Thanks!      Last OV notes from Dr Tinoco printed and faxed to Dr Abraham office, F#[phone].

## 2018-12-20 NOTE — TELEPHONE ENCOUNTER
Called Dr Abraham's office, s/w José Miguel, they confirmed they received the note from our office, she will find out w/ Dr Abraham if ok for pt to continue Aspirin, they will let us know if he would want pt to hold it.     FYI to Dr Tinoco

## 2019-01-31 ENCOUNTER — OFFICE VISIT (OUTPATIENT)
Dept: CARDIOLOGY | Facility: CLINIC | Age: 69
End: 2019-01-31
Payer: MEDICARE

## 2019-01-31 VITALS
SYSTOLIC BLOOD PRESSURE: 158 MMHG | DIASTOLIC BLOOD PRESSURE: 82 MMHG | WEIGHT: 151 LBS | HEART RATE: 68 BPM | OXYGEN SATURATION: 95 % | BODY MASS INDEX: 24.27 KG/M2 | HEIGHT: 66 IN

## 2019-01-31 DIAGNOSIS — I25.5 ISCHEMIC CARDIOMYOPATHY: ICD-10-CM

## 2019-01-31 DIAGNOSIS — E78.5 DYSLIPIDEMIA: ICD-10-CM

## 2019-01-31 DIAGNOSIS — I25.10 CORONARY ARTERY DISEASE INVOLVING NATIVE CORONARY ARTERY OF NATIVE HEART WITHOUT ANGINA PECTORIS: ICD-10-CM

## 2019-01-31 DIAGNOSIS — I10 ESSENTIAL HYPERTENSION, BENIGN: ICD-10-CM

## 2019-01-31 DIAGNOSIS — Z79.899 ENCOUNTER FOR LONG-TERM (CURRENT) USE OF HIGH-RISK MEDICATION: ICD-10-CM

## 2019-01-31 PROCEDURE — 99215 OFFICE O/P EST HI 40 MIN: CPT | Performed by: INTERNAL MEDICINE

## 2019-01-31 RX ORDER — CLOPIDOGREL BISULFATE 75 MG/1
75 TABLET ORAL DAILY
Status: ON HOLD | COMMUNITY
End: 2022-05-11 | Stop reason: SDUPTHER

## 2019-01-31 ASSESSMENT — ENCOUNTER SYMPTOMS
SHORTNESS OF BREATH: 0
PND: 0
LOSS OF CONSCIOUSNESS: 0
DEPRESSION: 0
ABDOMINAL PAIN: 0
ORTHOPNEA: 0
FALLS: 0
DIZZINESS: 0
PALPITATIONS: 0

## 2019-01-31 NOTE — PROGRESS NOTES
Subjective:   Ashlee Sepulveda is a 68-year-old female presenting to clinic for follow-up on coronary artery disease.    Pertinent history:  Coronary artery disease status post PCI to the mid LAD and 2 branches of the obtuse marginal artery in 2015  History of ischemic cardiomyopathy, EF is now normal  Hypertension  Hyperlipidemia  Peripheral arterial disease status post fem-fem bypass, left common iliac to left common femoral bypass.    In December 2018, patient was seen by her vascular surgeon and was found to have extensive peripheral arterial disease.  Since then she has undergone angioplasty of the right common iliac and femoral arteries.  She denies any claudication symptoms.    She denies any anginal symptoms.  Continues to stay active at work.    Denies any heart failure symptoms.  Her blood pressure is usually 130s systolic.  Thinks her blood pressure is high today because she ran from work as she was running late.  She is currently on Lipitor for hyperlipidemia which she is tolerating well.    Past Medical History:   Diagnosis Date   • Coronary artery disease involving native coronary artery of native heart without angina pectoris 2/15/2017   • Dyslipidemia 2/15/2017   • Essential hypertension, benign 2/15/2017   • Hypertension    • Ischemic cardiomyopathy 10/7/2015   • Myocardial infarct (HCC)    • Rectal cancer (HCC)     10 years ago   • S/P coronary artery stent placement 2/15/2017     Past Surgical History:   Procedure Laterality Date   • STENT PLACEMENT      left groin     Family History   Problem Relation Age of Onset   • Heart Attack Mother      History   Smoking Status   • Current Every Day Smoker   • Packs/day: 1.00   • Years: 20.00   • Types: Cigarettes   • Last attempt to quit: 9/30/2015   Smokeless Tobacco   • Never Used     No Known Allergies  Outpatient Encounter Prescriptions as of 1/31/2019   Medication Sig Dispense Refill   • clopidogrel (PLAVIX) 75 MG Tab Take 75 mg by mouth every day.    "  • nicotine (NICODERM) 14 MG/24HR PATCH 24 HR Apply 1 Patch to skin as directed every 24 hours. 14 Patch 0   • amLODIPine (NORVASC) 5 MG Tab Take 1 Tab by mouth every day. 90 Tab 3   • atorvastatin (LIPITOR) 80 MG tablet Take 1 Tab by mouth every day. 90 Tab 3   • lisinopril (PRINIVIL, ZESTRIL) 40 MG tablet Take 1 Tab by mouth every day. 90 Tab 3   • hydroCHLOROthiazide (HYDRODIURIL) 12.5 MG tablet Take 1 Tab by mouth every day. 90 Tab 3   • aspirin EC (ECOTRIN) 81 MG Tablet Delayed Response Take 1 Tab by mouth every day. 30 Tab    • nitroglycerin (NITROSTAT) 0.4 MG SL Tab Place 1 Tab under tongue as needed for Chest Pain. 25 Tab 0   • nicotine (NICODERM) 7 MG/24HR PATCH 24 HR Apply 1 Patch to skin as directed every 24 hours. (Patient not taking: Reported on 1/31/2019) 14 Patch 0   • [DISCONTINUED] nicotine (NICODERM) 21 MG/24HR PATCH 24 HR Apply 1 Patch to skin as directed every 24 hours. 14 Patch 0     No facility-administered encounter medications on file as of 1/31/2019.      Review of Systems   Constitutional: Negative for malaise/fatigue.   Respiratory: Negative for shortness of breath.    Cardiovascular: Negative for chest pain, palpitations, orthopnea, leg swelling and PND.   Gastrointestinal: Negative for abdominal pain.   Musculoskeletal: Negative for falls.   Neurological: Negative for dizziness and loss of consciousness.   Psychiatric/Behavioral: Negative for depression.   All other systems reviewed and are negative.       Objective:   /82 (BP Location: Right arm, Patient Position: Sitting)   Pulse 68   Ht 1.676 m (5' 6\")   Wt 68.5 kg (151 lb)   SpO2 95%   BMI 24.37 kg/m²     Physical Exam   Constitutional: She is oriented to person, place, and time. She appears well-developed and well-nourished. No distress.   HENT:   Head: Normocephalic and atraumatic.   Eyes: Conjunctivae are normal.   Neck: Normal range of motion. Neck supple. No JVD present.   Cardiovascular: Normal rate, regular rhythm " and normal heart sounds.  Exam reveals no gallop and no friction rub.    No murmur heard.  Pulmonary/Chest: Effort normal and breath sounds normal. No respiratory distress. She has no wheezes. She has no rales.   Abdominal: Soft. There is no tenderness.   Musculoskeletal: She exhibits no edema.   Neurological: She is alert and oriented to person, place, and time.   Skin: Skin is warm and dry. She is not diaphoretic.   Psychiatric: She has a normal mood and affect.   Nursing note and vitals reviewed.     Echocardiogram in February 2017 showed a normal LV systolic function with an ejection fraction of 65%.    Cardiac catheterization in 2015 showed three-vessel coronary artery disease. Status post PCI to the mid LAD and 2 branches of the obtuse marginal artery. Ejection fraction at that time was 35%.    Labs performed in December 2018 were reviewed and showed creatinine 1.38.  Normal potassium.  Normal hemoglobin.    Assessment:     1. Essential hypertension, benign     2. Dyslipidemia     3. Coronary artery disease involving native coronary artery of native heart without angina pectoris     4. Ischemic cardiomyopathy     5. Encounter for long-term (current) use of high-risk medication         Medical Decision Making:  Today's Assessment / Status / Plan:     Coronary artery disease: Status post PCI.  Continue aspirin and Lipitor.  She is on Plavix due to her recent peripheral procedure.    Hypertension: Blood pressure is elevated today.  Patient reports that her blood pressure is usually well controlled.  I will schedule for follow-up with us in about 2-3 weeks for repeat blood pressure check and comparison with her blood pressure cuff.  She should continue her current medication regimen including lisinopril.    Hyperlipidemia: Continue high-dose Lipitor.  Patient is tolerating it well.    Ischemic cardiomyopathy: EF is now normal.  Patient denies any heart failure symptoms.  Continue current medication  regimen.    Peripheral arterial disease: Patient denies any claudication symptoms.  Status post recent angioplasty.  Continue medications as listed above.    Return to clinic in 6 months to 1 year or earlier if needed.    Thank you for allowing me to participate in the care of this patient. Please do not hesitate to contact me with any questions.    Patricia Carrington MD  Cardiologist  [facility]      PLEASE NOTE: This dictation was created using voice recognition software.

## 2019-01-31 NOTE — LETTER
[facility] [facility] [address],   [address]  [place] [address]  Phone: [phone]  Fax: [phone]              Ashlee Sepulveda  1950    Encounter Date: 1/31/2019    Patricia Carrington M.D.          PROGRESS NOTE:  Subjective:   Ashlee Sepulveda is a 68-year-old female presenting to clinic for follow-up on coronary artery disease.    Pertinent history:  Coronary artery disease status post PCI to the mid LAD and 2 branches of the obtuse marginal artery in 2015  History of ischemic cardiomyopathy, EF is now normal  Hypertension  Hyperlipidemia  Peripheral arterial disease status post fem-fem bypass, left common iliac to left common femoral bypass.    In December 2018, patient was seen by her vascular surgeon and was found to have extensive peripheral arterial disease.  Since then she has undergone angioplasty of the right common iliac and femoral arteries.  She denies any claudication symptoms.    She denies any anginal symptoms.  Continues to stay active at work.    Denies any heart failure symptoms.  Her blood pressure is usually 130s systolic.  Thinks her blood pressure is high today because she ran from work as she was running late.  She is currently on Lipitor for hyperlipidemia which she is tolerating well.    Past Medical History:   Diagnosis Date   • Coronary artery disease involving native coronary artery of native heart without angina pectoris 2/15/2017   • Dyslipidemia 2/15/2017   • Essential hypertension, benign 2/15/2017   • Hypertension    • Ischemic cardiomyopathy 10/7/2015   • Myocardial infarct (HCC)    • Rectal cancer (HCC)     10 years ago   • S/P coronary artery stent placement 2/15/2017     Past Surgical History:   Procedure Laterality Date   • STENT PLACEMENT      left groin     Family History   Problem Relation Age of Onset   • Heart Attack Mother      History   Smoking Status   • Current Every Day Smoker   • Packs/day: 1.00   •  "Years: 20.00   • Types: Cigarettes   • Last attempt to quit: 9/30/2015   Smokeless Tobacco   • Never Used     No Known Allergies  Outpatient Encounter Prescriptions as of 1/31/2019   Medication Sig Dispense Refill   • clopidogrel (PLAVIX) 75 MG Tab Take 75 mg by mouth every day.     • nicotine (NICODERM) 14 MG/24HR PATCH 24 HR Apply 1 Patch to skin as directed every 24 hours. 14 Patch 0   • amLODIPine (NORVASC) 5 MG Tab Take 1 Tab by mouth every day. 90 Tab 3   • atorvastatin (LIPITOR) 80 MG tablet Take 1 Tab by mouth every day. 90 Tab 3   • lisinopril (PRINIVIL, ZESTRIL) 40 MG tablet Take 1 Tab by mouth every day. 90 Tab 3   • hydroCHLOROthiazide (HYDRODIURIL) 12.5 MG tablet Take 1 Tab by mouth every day. 90 Tab 3   • aspirin EC (ECOTRIN) 81 MG Tablet Delayed Response Take 1 Tab by mouth every day. 30 Tab    • nitroglycerin (NITROSTAT) 0.4 MG SL Tab Place 1 Tab under tongue as needed for Chest Pain. 25 Tab 0   • nicotine (NICODERM) 7 MG/24HR PATCH 24 HR Apply 1 Patch to skin as directed every 24 hours. (Patient not taking: Reported on 1/31/2019) 14 Patch 0   • [DISCONTINUED] nicotine (NICODERM) 21 MG/24HR PATCH 24 HR Apply 1 Patch to skin as directed every 24 hours. 14 Patch 0     No facility-administered encounter medications on file as of 1/31/2019.      Review of Systems   Constitutional: Negative for malaise/fatigue.   Respiratory: Negative for shortness of breath.    Cardiovascular: Negative for chest pain, palpitations, orthopnea, leg swelling and PND.   Gastrointestinal: Negative for abdominal pain.   Musculoskeletal: Negative for falls.   Neurological: Negative for dizziness and loss of consciousness.   Psychiatric/Behavioral: Negative for depression.   All other systems reviewed and are negative.       Objective:   /82 (BP Location: Right arm, Patient Position: Sitting)   Pulse 68   Ht 1.676 m (5' 6\")   Wt 68.5 kg (151 lb)   SpO2 95%   BMI 24.37 kg/m²      Physical Exam   Constitutional: She is " oriented to person, place, and time. She appears well-developed and well-nourished. No distress.   HENT:   Head: Normocephalic and atraumatic.   Eyes: Conjunctivae are normal.   Neck: Normal range of motion. Neck supple. No JVD present.   Cardiovascular: Normal rate, regular rhythm and normal heart sounds.  Exam reveals no gallop and no friction rub.    No murmur heard.  Pulmonary/Chest: Effort normal and breath sounds normal. No respiratory distress. She has no wheezes. She has no rales.   Abdominal: Soft. There is no tenderness.   Musculoskeletal: She exhibits no edema.   Neurological: She is alert and oriented to person, place, and time.   Skin: Skin is warm and dry. She is not diaphoretic.   Psychiatric: She has a normal mood and affect.   Nursing note and vitals reviewed.     Echocardiogram in February 2017 showed a normal LV systolic function with an ejection fraction of 65%.    Cardiac catheterization in 2015 showed three-vessel coronary artery disease. Status post PCI to the mid LAD and 2 branches of the obtuse marginal artery. Ejection fraction at that time was 35%.    Labs performed in December 2018 were reviewed and showed creatinine 1.38.  Normal potassium.  Normal hemoglobin.    Assessment:     1. Essential hypertension, benign     2. Dyslipidemia     3. Coronary artery disease involving native coronary artery of native heart without angina pectoris     4. Ischemic cardiomyopathy     5. Encounter for long-term (current) use of high-risk medication         Medical Decision Making:  Today's Assessment / Status / Plan:     Coronary artery disease: Status post PCI.  Continue aspirin and Lipitor.  She is on Plavix due to her recent peripheral procedure.    Hypertension: Blood pressure is elevated today.  Patient reports that her blood pressure is usually well controlled.  I will schedule for follow-up with us in about 2-3 weeks for repeat blood pressure check and comparison with her blood pressure cuff.  She  should continue her current medication regimen including lisinopril.    Hyperlipidemia: Continue high-dose Lipitor.  Patient is tolerating it well.    Ischemic cardiomyopathy: EF is now normal.  Patient denies any heart failure symptoms.  Continue current medication regimen.    Peripheral arterial disease: Patient denies any claudication symptoms.  Status post recent angioplasty.  Continue medications as listed above.    Return to clinic in 6 months to 1 year or earlier if needed.    Thank you for allowing me to participate in the care of this patient. Please do not hesitate to contact me with any questions.    Patricia Carrington MD  Cardiologist  [facility]      PLEASE NOTE: This dictation was created using voice recognition software.             Kasandra Sinha M.D.  [address] [facility] [address]  VIA Facsimile: [phone]

## 2019-02-26 ENCOUNTER — NON-PROVIDER VISIT (OUTPATIENT)
Dept: CARDIOLOGY | Facility: CLINIC | Age: 69
End: 2019-02-26
Payer: MEDICARE

## 2019-02-26 VITALS — HEART RATE: 62 BPM | DIASTOLIC BLOOD PRESSURE: 80 MMHG | OXYGEN SATURATION: 94 % | SYSTOLIC BLOOD PRESSURE: 140 MMHG

## 2019-03-06 DIAGNOSIS — I10 ESSENTIAL HYPERTENSION: Primary | ICD-10-CM

## 2019-03-06 RX ORDER — LISINOPRIL 40 MG/1
40 TABLET ORAL DAILY
Qty: 90 TAB | Refills: 3 | Status: SHIPPED | OUTPATIENT
Start: 2019-03-06 | End: 2020-04-17

## 2019-07-08 DIAGNOSIS — I10 ESSENTIAL HYPERTENSION: Primary | ICD-10-CM

## 2019-07-08 RX ORDER — AMLODIPINE BESYLATE 5 MG/1
5 TABLET ORAL DAILY
Qty: 90 TAB | Refills: 2 | Status: SHIPPED | OUTPATIENT
Start: 2019-07-08 | End: 2020-08-12 | Stop reason: SDUPTHER

## 2019-07-25 ENCOUNTER — OFFICE VISIT (OUTPATIENT)
Dept: CARDIOLOGY | Facility: CLINIC | Age: 69
End: 2019-07-25
Payer: MEDICARE

## 2019-07-25 VITALS
BODY MASS INDEX: 23.63 KG/M2 | HEART RATE: 70 BPM | WEIGHT: 147 LBS | DIASTOLIC BLOOD PRESSURE: 70 MMHG | OXYGEN SATURATION: 93 % | HEIGHT: 66 IN | SYSTOLIC BLOOD PRESSURE: 122 MMHG

## 2019-07-25 DIAGNOSIS — F17.210 CIGARETTE SMOKER: ICD-10-CM

## 2019-07-25 DIAGNOSIS — I25.83 CORONARY ARTERY DISEASE DUE TO LIPID RICH PLAQUE: ICD-10-CM

## 2019-07-25 DIAGNOSIS — I73.9 PERIPHERAL ARTERIAL DISEASE (HCC): ICD-10-CM

## 2019-07-25 DIAGNOSIS — I10 ESSENTIAL HYPERTENSION, BENIGN: ICD-10-CM

## 2019-07-25 DIAGNOSIS — M79.10 MYALGIA: ICD-10-CM

## 2019-07-25 DIAGNOSIS — E78.5 DYSLIPIDEMIA: ICD-10-CM

## 2019-07-25 DIAGNOSIS — I25.10 CORONARY ARTERY DISEASE DUE TO LIPID RICH PLAQUE: ICD-10-CM

## 2019-07-25 DIAGNOSIS — Z79.899 ENCOUNTER FOR LONG-TERM (CURRENT) USE OF HIGH-RISK MEDICATION: ICD-10-CM

## 2019-07-25 PROCEDURE — 99406 BEHAV CHNG SMOKING 3-10 MIN: CPT | Performed by: INTERNAL MEDICINE

## 2019-07-25 PROCEDURE — 99215 OFFICE O/P EST HI 40 MIN: CPT | Mod: 25 | Performed by: INTERNAL MEDICINE

## 2019-07-25 RX ORDER — ROSUVASTATIN CALCIUM 40 MG/1
40 TABLET, COATED ORAL DAILY
Qty: 30 TAB | Refills: 11 | Status: SHIPPED | OUTPATIENT
Start: 2019-07-25 | End: 2020-02-18 | Stop reason: SDUPTHER

## 2019-07-25 ASSESSMENT — ENCOUNTER SYMPTOMS
SHORTNESS OF BREATH: 0
DIZZINESS: 0
PALPITATIONS: 0
ABDOMINAL PAIN: 0
LOSS OF CONSCIOUSNESS: 0
ORTHOPNEA: 0
PND: 0
FALLS: 0
MYALGIAS: 1
DEPRESSION: 0

## 2019-07-25 NOTE — PROGRESS NOTES
Chief Complaint   Patient presents with   • HTN (Controlled)       Subjective:   Ashlee Sepulveda is a 69 y.o. female who presents today to follow-up on coronary artery disease.    Pertinent history:  Coronary artery disease status post PCI to the mid LAD and 2 branches of the obtuse marginal artery in 2015  History of ischemic cardiomyopathy, EF is now normal  Hypertension  Hyperlipidemia  Peripheral arterial disease status post fem-fem bypass, left common iliac to left common femoral bypass.  Status post angioplasty of the right common iliac and femoral arteries in December 2018      Patient has been doing well overall.  She denies any claudication symptoms.  No anginal symptoms.  Continues to stay active.  No heart failure symptoms.  Her blood pressures have been mostly in the 120s systolic.    Her main concern today is diffuse myalgias that she has noted in the past few months for which she has to often take Advil without much benefit.  She is currently on high-dose Lipitor.    Patient continues to smoke at least half a pack a day.    Past Medical History:   Diagnosis Date   • Coronary artery disease involving native coronary artery of native heart without angina pectoris 2/15/2017   • Dyslipidemia 2/15/2017   • Essential hypertension, benign 2/15/2017   • Hypertension    • Ischemic cardiomyopathy 10/7/2015   • Myocardial infarct (HCC)    • Rectal cancer (HCC)     10 years ago   • S/P coronary artery stent placement 2/15/2017     Past Surgical History:   Procedure Laterality Date   • STENT PLACEMENT      left groin     Family History   Problem Relation Age of Onset   • Heart Attack Mother      Social History     Social History   • Marital status:      Spouse name: N/A   • Number of children: N/A   • Years of education: N/A     Occupational History   • Not on file.     Social History Main Topics   • Smoking status: Current Every Day Smoker     Packs/day: 1.00     Years: 20.00     Types: Cigarettes     Last  attempt to quit: 9/30/2015   • Smokeless tobacco: Never Used   • Alcohol use Yes      Comment: occ - once a month or less   • Drug use: No   • Sexual activity: Not on file     Other Topics Concern   • Not on file     Social History Narrative   • No narrative on file     No Known Allergies  Outpatient Encounter Prescriptions as of 7/25/2019   Medication Sig Dispense Refill   • rosuvastatin (CRESTOR) 40 MG tablet Take 1 Tab by mouth every day. 30 Tab 11   • amLODIPine (NORVASC) 5 MG Tab Take 1 Tab by mouth every day. 90 Tab 2   • lisinopril (PRINIVIL, ZESTRIL) 40 MG tablet Take 1 Tab by mouth every day. 90 Tab 3   • clopidogrel (PLAVIX) 75 MG Tab Take 75 mg by mouth every day.     • hydroCHLOROthiazide (HYDRODIURIL) 12.5 MG tablet Take 1 Tab by mouth every day. 90 Tab 3   • aspirin EC (ECOTRIN) 81 MG Tablet Delayed Response Take 1 Tab by mouth every day. 30 Tab    • nitroglycerin (NITROSTAT) 0.4 MG SL Tab Place 1 Tab under tongue as needed for Chest Pain. 25 Tab 0   • [DISCONTINUED] nicotine (NICODERM) 14 MG/24HR PATCH 24 HR Apply 1 Patch to skin as directed every 24 hours. 14 Patch 0   • [DISCONTINUED] nicotine (NICODERM) 7 MG/24HR PATCH 24 HR Apply 1 Patch to skin as directed every 24 hours. (Patient not taking: Reported on 1/31/2019) 14 Patch 0   • [DISCONTINUED] atorvastatin (LIPITOR) 80 MG tablet Take 1 Tab by mouth every day. 90 Tab 3     No facility-administered encounter medications on file as of 7/25/2019.      Review of Systems   Constitutional: Negative for malaise/fatigue.   Respiratory: Negative for shortness of breath.    Cardiovascular: Negative for chest pain, palpitations, orthopnea, leg swelling and PND.   Gastrointestinal: Negative for abdominal pain.   Musculoskeletal: Positive for myalgias. Negative for falls.   Neurological: Negative for dizziness and loss of consciousness.   Psychiatric/Behavioral: Negative for depression.   All other systems reviewed and are negative.       Objective:   BP  "122/70 (BP Location: Right arm, Patient Position: Sitting)   Pulse 70   Ht 1.676 m (5' 6\")   Wt 66.7 kg (147 lb)   SpO2 93%   BMI 23.73 kg/m²     Physical Exam   Constitutional: She is oriented to person, place, and time. She appears well-developed and well-nourished. No distress.   HENT:   Head: Normocephalic and atraumatic.   Eyes: Conjunctivae are normal. No scleral icterus.   Neck: Normal range of motion. Neck supple.   Cardiovascular: Normal rate, regular rhythm and normal heart sounds.  Exam reveals no gallop and no friction rub.    No murmur heard.  Pulmonary/Chest: Effort normal and breath sounds normal. No respiratory distress. She has no wheezes. She has no rales.   Abdominal: Soft. She exhibits no distension. There is no tenderness.   Musculoskeletal: She exhibits no edema.   Neurological: She is alert and oriented to person, place, and time.   Skin: Skin is warm and dry. She is not diaphoretic.   Psychiatric: She has a normal mood and affect. Her behavior is normal.   Nursing note and vitals reviewed.    Echocardiogram in February 2017 showed a normal LV systolic function with an ejection fraction of 65%.    Cardiac catheterization in 2015 showed three-vessel coronary artery disease. Status post PCI to the mid LAD and 2 branches of the obtuse marginal artery. Ejection fraction at that time was 35%.    Assessment:     1. Essential hypertension, benign  Basic Metabolic Panel   2. Dyslipidemia  Lipid Profile   3. Cigarette smoker     4. Myalgia     5. Coronary artery disease due to lipid rich plaque     6. Peripheral arterial disease (HCC)     7. Encounter for long-term (current) use of high-risk medication         Medical Decision Making:  Today's Assessment / Status / Plan:     Coronary artery disease:  Hypertension:  No anginal symptoms.  Blood pressure is at goal.  Continue aspirin and Plavix.  Continue current antihypertensive regimen including lisinopril.  Chem-7 has been ordered today to " evaluate renal function.    Hyperlipidemia:  Myalgias:  Lipid panel ordered today.  Patient reports myalgias which could be secondary to her high-dose Lipitor.  Will discontinue Lipitor instead start her on Crestor 40 mg once a day.  If her symptoms fail to improve, she should let us know.    Peripheral arterial disease: Patient denies any claudication symptoms.  Continue therapy as above.    Tobacco use: Patient continues to smoke on a daily basis.  I have discussed the benefits of smoking cessation.  Patient is not interested in quitting at this time.  I spent 4 minutes discussing smoking cessation.    Return to clinic in 6 months to 1 year or earlier if needed.    Thank you for allowing me to participate in the care of this patient. Please do not hesitate to contact me with any questions.    Patricia Carrington MD  Cardiologist  [facility]      PLEASE NOTE: This dictation was created using voice recognition software.

## 2019-07-25 NOTE — PROGRESS NOTES
Subjective:   Ashlee Sepulveda is a 68-year-old female presenting to clinic for follow-up on coronary artery disease.    Pertinent history:  Coronary artery disease status post PCI to the mid LAD and 2 branches of the obtuse marginal artery in 2015  History of ischemic cardiomyopathy, EF is now normal  Hypertension  Hyperlipidemia  Peripheral arterial disease status post fem-fem bypass, left common iliac to left common femoral bypass.    In December 2018, patient was seen by her vascular surgeon and was found to have extensive peripheral arterial disease.  Since then she has undergone angioplasty of the right common iliac and femoral arteries.  She denies any claudication symptoms.    She denies any anginal symptoms.  Continues to stay active at work.    Denies any heart failure symptoms.  Her blood pressure is usually 130s systolic.  Thinks her blood pressure is high today because she ran from work as she was running late.  She is currently on Lipitor for hyperlipidemia which she is tolerating well.    Past Medical History:   Diagnosis Date   • Coronary artery disease involving native coronary artery of native heart without angina pectoris 2/15/2017   • Dyslipidemia 2/15/2017   • Essential hypertension, benign 2/15/2017   • Hypertension    • Ischemic cardiomyopathy 10/7/2015   • Myocardial infarct (HCC)    • Rectal cancer (HCC)     10 years ago   • S/P coronary artery stent placement 2/15/2017     Past Surgical History:   Procedure Laterality Date   • STENT PLACEMENT      left groin     Family History   Problem Relation Age of Onset   • Heart Attack Mother      History   Smoking Status   • Current Every Day Smoker   • Packs/day: 1.00   • Years: 20.00   • Types: Cigarettes   • Last attempt to quit: 9/30/2015   Smokeless Tobacco   • Never Used     No Known Allergies  Outpatient Encounter Prescriptions as of 7/25/2019   Medication Sig Dispense Refill   • amLODIPine (NORVASC) 5 MG Tab Take 1 Tab by mouth every day. 90  "Tab 2   • lisinopril (PRINIVIL, ZESTRIL) 40 MG tablet Take 1 Tab by mouth every day. 90 Tab 3   • clopidogrel (PLAVIX) 75 MG Tab Take 75 mg by mouth every day.     • atorvastatin (LIPITOR) 80 MG tablet Take 1 Tab by mouth every day. 90 Tab 3   • hydroCHLOROthiazide (HYDRODIURIL) 12.5 MG tablet Take 1 Tab by mouth every day. 90 Tab 3   • aspirin EC (ECOTRIN) 81 MG Tablet Delayed Response Take 1 Tab by mouth every day. 30 Tab    • nitroglycerin (NITROSTAT) 0.4 MG SL Tab Place 1 Tab under tongue as needed for Chest Pain. 25 Tab 0   • nicotine (NICODERM) 14 MG/24HR PATCH 24 HR Apply 1 Patch to skin as directed every 24 hours. 14 Patch 0   • nicotine (NICODERM) 7 MG/24HR PATCH 24 HR Apply 1 Patch to skin as directed every 24 hours. (Patient not taking: Reported on 1/31/2019) 14 Patch 0     No facility-administered encounter medications on file as of 7/25/2019.      Review of Systems   Constitutional: Negative for malaise/fatigue.   Respiratory: Negative for shortness of breath.    Cardiovascular: Negative for chest pain, palpitations, orthopnea, leg swelling and PND.   Gastrointestinal: Negative for abdominal pain.   Musculoskeletal: Negative for falls.   Neurological: Negative for dizziness and loss of consciousness.   Psychiatric/Behavioral: Negative for depression.   All other systems reviewed and are negative.       Objective:   /70 (BP Location: Right arm, Patient Position: Sitting)   Pulse 70   Ht 1.676 m (5' 6\")   Wt 66.7 kg (147 lb)   SpO2 93%   BMI 23.73 kg/m²     Physical Exam   Constitutional: She is oriented to person, place, and time. She appears well-developed and well-nourished. No distress.   HENT:   Head: Normocephalic and atraumatic.   Eyes: Conjunctivae are normal.   Neck: Normal range of motion. Neck supple. No JVD present.   Cardiovascular: Normal rate, regular rhythm and normal heart sounds.  Exam reveals no gallop and no friction rub.    No murmur heard.  Pulmonary/Chest: Effort normal " and breath sounds normal. No respiratory distress. She has no wheezes. She has no rales.   Abdominal: Soft. There is no tenderness.   Musculoskeletal: She exhibits no edema.   Neurological: She is alert and oriented to person, place, and time.   Skin: Skin is warm and dry. She is not diaphoretic.   Psychiatric: She has a normal mood and affect.   Nursing note and vitals reviewed.     Echocardiogram in February 2017 showed a normal LV systolic function with an ejection fraction of 65%.    Cardiac catheterization in 2015 showed three-vessel coronary artery disease. Status post PCI to the mid LAD and 2 branches of the obtuse marginal artery. Ejection fraction at that time was 35%.    Labs performed in December 2018 were reviewed and showed creatinine 1.38.  Normal potassium.  Normal hemoglobin.    Assessment:     No diagnosis found.    Medical Decision Making:  Today's Assessment / Status / Plan:     Coronary artery disease: Status post PCI.  Continue aspirin and Lipitor.  She is on Plavix due to her recent peripheral procedure.    Hypertension: Blood pressure is elevated today.  Patient reports that her blood pressure is usually well controlled.  I will schedule for follow-up with us in about 2-3 weeks for repeat blood pressure check and comparison with her blood pressure cuff.  She should continue her current medication regimen including lisinopril.    Hyperlipidemia: Continue high-dose Lipitor.  Patient is tolerating it well.    Ischemic cardiomyopathy: EF is now normal.  Patient denies any heart failure symptoms.  Continue current medication regimen.    Peripheral arterial disease: Patient denies any claudication symptoms.  Status post recent angioplasty.  Continue medications as listed above.    Return to clinic in 6 months to 1 year or earlier if needed.    Thank you for allowing me to participate in the care of this patient. Please do not hesitate to contact me with any questions.    Patricia Carrington  MD  Cardiologist  [facility]      PLEASE NOTE: This dictation was created using voice recognition software.

## 2019-07-25 NOTE — LETTER
[facility] [place] [address],   [address] [place] [address]  Phone: [phone]  Fax: [phone]              Ahslee Sepulveda  1950    Encounter Date: 7/25/2019    Patricia Carrington M.D.          PROGRESS NOTE:  Chief Complaint   Patient presents with   • HTN (Controlled)       Subjective:   Ashlee Sepulveda is a 69 y.o. female who presents today to follow-up on coronary artery disease.    Pertinent history:  Coronary artery disease status post PCI to the mid LAD and 2 branches of the obtuse marginal artery in 2015  History of ischemic cardiomyopathy, EF is now normal  Hypertension  Hyperlipidemia  Peripheral arterial disease status post fem-fem bypass, left common iliac to left common femoral bypass.  Status post angioplasty of the right common iliac and femoral arteries in December 2018      Patient has been doing well overall.  She denies any claudication symptoms.  No anginal symptoms.  Continues to stay active.  No heart failure symptoms.  Her blood pressures have been mostly in the 120s systolic.    Her main concern today is diffuse myalgias that she has noted in the past few months for which she has to often take Advil without much benefit.  She is currently on high-dose Lipitor.    Patient continues to smoke at least half a pack a day.    Past Medical History:   Diagnosis Date   • Coronary artery disease involving native coronary artery of native heart without angina pectoris 2/15/2017   • Dyslipidemia 2/15/2017   • Essential hypertension, benign 2/15/2017   • Hypertension    • Ischemic cardiomyopathy 10/7/2015   • Myocardial infarct (HCC)    • Rectal cancer (HCC)     10 years ago   • S/P coronary artery stent placement 2/15/2017     Past Surgical History:   Procedure Laterality Date   • STENT PLACEMENT      left groin     Family History   Problem Relation Age of Onset   • Heart Attack Mother      Social History     Social History   •  Marital status:      Spouse name: N/A   • Number of children: N/A   • Years of education: N/A     Occupational History   • Not on file.     Social History Main Topics   • Smoking status: Current Every Day Smoker     Packs/day: 1.00     Years: 20.00     Types: Cigarettes     Last attempt to quit: 9/30/2015   • Smokeless tobacco: Never Used   • Alcohol use Yes      Comment: occ - once a month or less   • Drug use: No   • Sexual activity: Not on file     Other Topics Concern   • Not on file     Social History Narrative   • No narrative on file     No Known Allergies  Outpatient Encounter Prescriptions as of 7/25/2019   Medication Sig Dispense Refill   • rosuvastatin (CRESTOR) 40 MG tablet Take 1 Tab by mouth every day. 30 Tab 11   • amLODIPine (NORVASC) 5 MG Tab Take 1 Tab by mouth every day. 90 Tab 2   • lisinopril (PRINIVIL, ZESTRIL) 40 MG tablet Take 1 Tab by mouth every day. 90 Tab 3   • clopidogrel (PLAVIX) 75 MG Tab Take 75 mg by mouth every day.     • hydroCHLOROthiazide (HYDRODIURIL) 12.5 MG tablet Take 1 Tab by mouth every day. 90 Tab 3   • aspirin EC (ECOTRIN) 81 MG Tablet Delayed Response Take 1 Tab by mouth every day. 30 Tab    • nitroglycerin (NITROSTAT) 0.4 MG SL Tab Place 1 Tab under tongue as needed for Chest Pain. 25 Tab 0   • [DISCONTINUED] nicotine (NICODERM) 14 MG/24HR PATCH 24 HR Apply 1 Patch to skin as directed every 24 hours. 14 Patch 0   • [DISCONTINUED] nicotine (NICODERM) 7 MG/24HR PATCH 24 HR Apply 1 Patch to skin as directed every 24 hours. (Patient not taking: Reported on 1/31/2019) 14 Patch 0   • [DISCONTINUED] atorvastatin (LIPITOR) 80 MG tablet Take 1 Tab by mouth every day. 90 Tab 3     No facility-administered encounter medications on file as of 7/25/2019.      Review of Systems   Constitutional: Negative for malaise/fatigue.   Respiratory: Negative for shortness of breath.    Cardiovascular: Negative for chest pain, palpitations, orthopnea, leg swelling and PND.    "  Gastrointestinal: Negative for abdominal pain.   Musculoskeletal: Positive for myalgias. Negative for falls.   Neurological: Negative for dizziness and loss of consciousness.   Psychiatric/Behavioral: Negative for depression.   All other systems reviewed and are negative.       Objective:   /70 (BP Location: Right arm, Patient Position: Sitting)   Pulse 70   Ht 1.676 m (5' 6\")   Wt 66.7 kg (147 lb)   SpO2 93%   BMI 23.73 kg/m²      Physical Exam   Constitutional: She is oriented to person, place, and time. She appears well-developed and well-nourished. No distress.   HENT:   Head: Normocephalic and atraumatic.   Eyes: Conjunctivae are normal. No scleral icterus.   Neck: Normal range of motion. Neck supple.   Cardiovascular: Normal rate, regular rhythm and normal heart sounds.  Exam reveals no gallop and no friction rub.    No murmur heard.  Pulmonary/Chest: Effort normal and breath sounds normal. No respiratory distress. She has no wheezes. She has no rales.   Abdominal: Soft. She exhibits no distension. There is no tenderness.   Musculoskeletal: She exhibits no edema.   Neurological: She is alert and oriented to person, place, and time.   Skin: Skin is warm and dry. She is not diaphoretic.   Psychiatric: She has a normal mood and affect. Her behavior is normal.   Nursing note and vitals reviewed.    Echocardiogram in February 2017 showed a normal LV systolic function with an ejection fraction of 65%.    Cardiac catheterization in 2015 showed three-vessel coronary artery disease. Status post PCI to the mid LAD and 2 branches of the obtuse marginal artery. Ejection fraction at that time was 35%.    Assessment:     1. Essential hypertension, benign  Basic Metabolic Panel   2. Dyslipidemia  Lipid Profile   3. Cigarette smoker     4. Myalgia     5. Coronary artery disease due to lipid rich plaque     6. Peripheral arterial disease (HCC)     7. Encounter for long-term (current) use of high-risk medication   "       Medical Decision Making:  Today's Assessment / Status / Plan:     Coronary artery disease:  Hypertension:  No anginal symptoms.  Blood pressure is at goal.  Continue aspirin and Plavix.  Continue current antihypertensive regimen including lisinopril.  Chem-7 has been ordered today to evaluate renal function.    Hyperlipidemia:  Myalgias:  Lipid panel ordered today.  Patient reports myalgias which could be secondary to her high-dose Lipitor.  Will discontinue Lipitor instead start her on Crestor 40 mg once a day.  If her symptoms fail to improve, she should let us know.    Peripheral arterial disease: Patient denies any claudication symptoms.  Continue therapy as above.    Tobacco use: Patient continues to smoke on a daily basis.  I have discussed the benefits of smoking cessation.  Patient is not interested in quitting at this time.  I spent 4 minutes discussing smoking cessation.    Return to clinic in 6 months to 1 year or earlier if needed.    Thank you for allowing me to participate in the care of this patient. Please do not hesitate to contact me with any questions.    Patricia Carrington MD  Cardiologist  [facility]      PLEASE NOTE: This dictation was created using voice recognition software.           Kasandra Sinha M.D.  [address] [facility] [address]  VIA Facsimile: [phone]

## 2020-02-10 ENCOUNTER — TELEPHONE (OUTPATIENT)
Dept: CARDIOLOGY | Facility: CLINIC | Age: 70
End: 2020-02-10

## 2020-02-18 ENCOUNTER — OFFICE VISIT (OUTPATIENT)
Dept: CARDIOLOGY | Facility: CLINIC | Age: 70
End: 2020-02-18
Payer: MEDICARE

## 2020-02-18 VITALS
OXYGEN SATURATION: 93 % | HEART RATE: 56 BPM | SYSTOLIC BLOOD PRESSURE: 140 MMHG | BODY MASS INDEX: 22.98 KG/M2 | WEIGHT: 143 LBS | DIASTOLIC BLOOD PRESSURE: 80 MMHG | HEIGHT: 66 IN

## 2020-02-18 DIAGNOSIS — I25.10 CORONARY ARTERY DISEASE DUE TO LIPID RICH PLAQUE: ICD-10-CM

## 2020-02-18 DIAGNOSIS — F17.210 CIGARETTE SMOKER: ICD-10-CM

## 2020-02-18 DIAGNOSIS — E78.5 DYSLIPIDEMIA: ICD-10-CM

## 2020-02-18 DIAGNOSIS — M79.10 MYALGIA: ICD-10-CM

## 2020-02-18 DIAGNOSIS — I73.9 PERIPHERAL ARTERIAL DISEASE (HCC): ICD-10-CM

## 2020-02-18 DIAGNOSIS — I25.83 CORONARY ARTERY DISEASE DUE TO LIPID RICH PLAQUE: ICD-10-CM

## 2020-02-18 DIAGNOSIS — I10 ESSENTIAL HYPERTENSION, BENIGN: ICD-10-CM

## 2020-02-18 DIAGNOSIS — Z79.899 ENCOUNTER FOR LONG-TERM (CURRENT) USE OF HIGH-RISK MEDICATION: ICD-10-CM

## 2020-02-18 PROBLEM — I65.23 OCCLUSION AND STENOSIS OF BILATERAL CAROTID ARTERIES: Status: ACTIVE | Noted: 2020-02-18

## 2020-02-18 PROCEDURE — 99215 OFFICE O/P EST HI 40 MIN: CPT | Mod: 25 | Performed by: INTERNAL MEDICINE

## 2020-02-18 PROCEDURE — 99406 BEHAV CHNG SMOKING 3-10 MIN: CPT | Performed by: INTERNAL MEDICINE

## 2020-02-18 RX ORDER — ROSUVASTATIN CALCIUM 20 MG/1
20 TABLET, COATED ORAL DAILY
Qty: 30 TAB | Refills: 11 | Status: ON HOLD | OUTPATIENT
Start: 2020-02-18 | End: 2022-05-11

## 2020-02-18 ASSESSMENT — ENCOUNTER SYMPTOMS
LOSS OF CONSCIOUSNESS: 0
DIZZINESS: 0
ABDOMINAL PAIN: 0
FALLS: 0
DEPRESSION: 0
PND: 0
MYALGIAS: 1
PALPITATIONS: 0
ORTHOPNEA: 0
SHORTNESS OF BREATH: 0

## 2020-02-18 NOTE — LETTER
[facility] [place] [address],   [address]  [place] [address]  Phone: [phone]  Fax: [phone]              Ashlee Sepulveda  1950    Encounter Date: 2/18/2020    Patricia Carrington M.D.          PROGRESS NOTE:  Chief Complaint   Patient presents with   • HTN (Controlled)       Subjective:   Ashlee Sepulveda is a 69-year-old female presenting to clinic for follow-up on coronary artery disease.    Pertinent history:  Coronary artery disease status post PCI to the mid LAD and 2 branches of the obtuse marginal artery in 2015  History of ischemic cardiomyopathy, EF is now normal  Hypertension  Hyperlipidemia  Peripheral arterial disease status post fem-fem bypass, left common iliac to left common femoral bypass.  Status post angioplasty of the right common iliac and femoral arteries in December 2018        Patient continues to feel well overall from a cardiac standpoint.  She denies any anginal symptoms or heart failure symptoms.    Her blood pressures have been mostly in the 120s systolic.    For hyperlipidemia she is on Crestor.  She does report ongoing myalgias which have not improved since she was transition from Lipitor to Crestor.  She denies any claudication symptoms.  She continues to smoke at least half a pack a day.    She is a  at a [employer] and is exposed to smoke all day long.    Past Medical History:   Diagnosis Date   • Coronary artery disease involving native coronary artery of native heart without angina pectoris 2/15/2017   • Dyslipidemia 2/15/2017   • Essential hypertension, benign 2/15/2017   • Hypertension    • Ischemic cardiomyopathy 10/7/2015   • Myocardial infarct (HCC)    • Rectal cancer (HCC)     10 years ago   • S/P coronary artery stent placement 2/15/2017     Past Surgical History:   Procedure Laterality Date   • STENT PLACEMENT      left groin     Family History   Problem Relation Age of Onset   • Heart Attack  Mother      Social History     Socioeconomic History   • Marital status:      Spouse name: Not on file   • Number of children: Not on file   • Years of education: Not on file   • Highest education level: Not on file   Occupational History   • Not on file   Social Needs   • Financial resource strain: Not on file   • Food insecurity     Worry: Not on file     Inability: Not on file   • Transportation needs     Medical: Not on file     Non-medical: Not on file   Tobacco Use   • Smoking status: Current Every Day Smoker     Packs/day: 1.00     Years: 20.00     Pack years: 20.00     Types: Cigarettes     Last attempt to quit: 2015     Years since quittin.3   • Smokeless tobacco: Never Used   Substance and Sexual Activity   • Alcohol use: Yes     Comment: occ - once a month or less   • Drug use: No   • Sexual activity: Not on file   Lifestyle   • Physical activity     Days per week: Not on file     Minutes per session: Not on file   • Stress: Not on file   Relationships   • Social connections     Talks on phone: Not on file     Gets together: Not on file     Attends Zoroastrianism service: Not on file     Active member of club or organization: Not on file     Attends meetings of clubs or organizations: Not on file     Relationship status: Not on file   • Intimate partner violence     Fear of current or ex partner: Not on file     Emotionally abused: Not on file     Physically abused: Not on file     Forced sexual activity: Not on file   Other Topics Concern   • Not on file   Social History Narrative   • Not on file     No Known Allergies  Outpatient Encounter Medications as of 2020   Medication Sig Dispense Refill   • rosuvastatin (CRESTOR) 20 MG Tab Take 1 Tab by mouth every day. 30 Tab 11   • amLODIPine (NORVASC) 5 MG Tab Take 1 Tab by mouth every day. 90 Tab 2   • lisinopril (PRINIVIL, ZESTRIL) 40 MG tablet Take 1 Tab by mouth every day. 90 Tab 3   • clopidogrel (PLAVIX) 75 MG Tab Take 75 mg by mouth  "every day.     • hydroCHLOROthiazide (HYDRODIURIL) 12.5 MG tablet Take 1 Tab by mouth every day. 90 Tab 3   • aspirin EC (ECOTRIN) 81 MG Tablet Delayed Response Take 1 Tab by mouth every day. 30 Tab    • nitroglycerin (NITROSTAT) 0.4 MG SL Tab Place 1 Tab under tongue as needed for Chest Pain. 25 Tab 0   • [DISCONTINUED] rosuvastatin (CRESTOR) 40 MG tablet Take 1 Tab by mouth every day. 30 Tab 11     No facility-administered encounter medications on file as of 2/18/2020.      Review of Systems   Constitutional: Negative for malaise/fatigue.   Respiratory: Negative for shortness of breath.    Cardiovascular: Negative for chest pain, palpitations, orthopnea, leg swelling and PND.   Gastrointestinal: Negative for abdominal pain.   Musculoskeletal: Positive for myalgias. Negative for falls.   Neurological: Negative for dizziness and loss of consciousness.   Psychiatric/Behavioral: Negative for depression.   All other systems reviewed and are negative.       Objective:   /80 (BP Location: Right arm, Patient Position: Sitting)   Pulse (!) 56   Ht 1.676 m (5' 6\")   Wt 64.9 kg (143 lb)   SpO2 93%   BMI 23.08 kg/m²      Physical Exam   Constitutional: She is oriented to person, place, and time. She appears well-developed and well-nourished. No distress.   HENT:   Head: Normocephalic and atraumatic.   Eyes: Conjunctivae are normal. No scleral icterus.   Neck: Normal range of motion. Neck supple.   Cardiovascular: Normal rate, regular rhythm and normal heart sounds. Exam reveals no gallop and no friction rub.   No murmur heard.  Pulmonary/Chest: Effort normal and breath sounds normal. No respiratory distress. She has no wheezes. She has no rales.   Abdominal: Soft. She exhibits no distension. There is no abdominal tenderness.   Musculoskeletal:         General: No edema.   Neurological: She is alert and oriented to person, place, and time.   Skin: Skin is warm and dry. She is not diaphoretic.   Psychiatric: She has " a normal mood and affect. Her behavior is normal.   Nursing note and vitals reviewed.    Echocardiogram in February 2017 showed a normal LV systolic function with an ejection fraction of 65%.    Cardiac catheterization in 2015 showed three-vessel coronary artery disease. Status post PCI to the mid LAD and 2 branches of the obtuse marginal artery. Ejection fraction at that time was 35%.    Labs performed February 2020 were reviewed and showed creatinine 1.0.  LDL 56.  HDL 60.  Triglycerides 100    Assessment:     1. Coronary artery disease due to lipid rich plaque     2. Dyslipidemia     3. Essential hypertension, benign     4. Peripheral arterial disease (HCC)     5. Myalgia     6. Cigarette smoker     7. Encounter for long-term (current) use of high-risk medication         Medical Decision Making:  Today's Assessment / Status / Plan:     Coronary artery disease: Patient denies any anginal symptoms.  Continue aspirin and Plavix.    Hypertension: Her blood pressure is borderline elevated today.  Usually well controlled.  No medication changes for now.  If blood pressure at home continues to be elevated, he should let us know.  Continue lisinopril, hydrochlorothiazide and amlodipine at current dose.  Her renal function has been normal.    Hyperlipidemia:  Myalgias:  Patient continues to report myalgias despite changing to Crestor.  Will reduce Crestor to 20 mg once daily which is less than ideal given her peripheral and coronary artery disease however she is bothered by her myalgias.    Peripheral arterial disease: Patient denies any claudication symptoms.  Continue with therapy as above.    Tobacco use: Patient continues to smoke on a daily basis.  I have discussed the benefits of smoking cessation.  Patient is not interested in quitting at this time but does think about it.  I spent 4 minutes discussing smoking cessation.    Return to clinic in 6 months or earlier if needed.    Thank you for allowing me to  participate in the care of this patient. Please do not hesitate to contact me with any questions.    Patricia Carrington MD  Cardiologist  [facility]      PLEASE NOTE: This dictation was created using voice recognition software.           Kasandra Sinha M.D.  [address] [facility] [address]  VIA Facsimile: [phone]

## 2020-02-18 NOTE — PROGRESS NOTES
Chief Complaint   Patient presents with   • HTN (Controlled)       Subjective:   Ashlee Sepulveda is a 69-year-old female presenting to clinic for follow-up on coronary artery disease.    Pertinent history:  Coronary artery disease status post PCI to the mid LAD and 2 branches of the obtuse marginal artery in 2015  History of ischemic cardiomyopathy, EF is now normal  Hypertension  Hyperlipidemia  Peripheral arterial disease status post fem-fem bypass, left common iliac to left common femoral bypass.  Status post angioplasty of the right common iliac and femoral arteries in December 2018        Patient continues to feel well overall from a cardiac standpoint.  She denies any anginal symptoms or heart failure symptoms.    Her blood pressures have been mostly in the 120s systolic.    For hyperlipidemia she is on Crestor.  She does report ongoing myalgias which have not improved since she was transition from Lipitor to Crestor.  She denies any claudication symptoms.  She continues to smoke at least half a pack a day.    She is a  at a [employer] and is exposed to smoke all day long.    Past Medical History:   Diagnosis Date   • Coronary artery disease involving native coronary artery of native heart without angina pectoris 2/15/2017   • Dyslipidemia 2/15/2017   • Essential hypertension, benign 2/15/2017   • Hypertension    • Ischemic cardiomyopathy 10/7/2015   • Myocardial infarct (HCC)    • Rectal cancer (HCC)     10 years ago   • S/P coronary artery stent placement 2/15/2017     Past Surgical History:   Procedure Laterality Date   • STENT PLACEMENT      left groin     Family History   Problem Relation Age of Onset   • Heart Attack Mother      Social History     Socioeconomic History   • Marital status:      Spouse name: Not on file   • Number of children: Not on file   • Years of education: Not on file   • Highest education level: Not on file   Occupational History   • Not on file   Social Needs   •  Financial resource strain: Not on file   • Food insecurity     Worry: Not on file     Inability: Not on file   • Transportation needs     Medical: Not on file     Non-medical: Not on file   Tobacco Use   • Smoking status: Current Every Day Smoker     Packs/day: 1.00     Years: 20.00     Pack years: 20.00     Types: Cigarettes     Last attempt to quit: 2015     Years since quittin.3   • Smokeless tobacco: Never Used   Substance and Sexual Activity   • Alcohol use: Yes     Comment: occ - once a month or less   • Drug use: No   • Sexual activity: Not on file   Lifestyle   • Physical activity     Days per week: Not on file     Minutes per session: Not on file   • Stress: Not on file   Relationships   • Social connections     Talks on phone: Not on file     Gets together: Not on file     Attends [employer] service: Not on file     Active member of club or organization: Not on file     Attends meetings of clubs or organizations: Not on file     Relationship status: Not on file   • Intimate partner violence     Fear of current or ex partner: Not on file     Emotionally abused: Not on file     Physically abused: Not on file     Forced sexual activity: Not on file   Other Topics Concern   • Not on file   Social History Narrative   • Not on file     No Known Allergies  Outpatient Encounter Medications as of 2020   Medication Sig Dispense Refill   • rosuvastatin (CRESTOR) 20 MG Tab Take 1 Tab by mouth every day. 30 Tab 11   • amLODIPine (NORVASC) 5 MG Tab Take 1 Tab by mouth every day. 90 Tab 2   • lisinopril (PRINIVIL, ZESTRIL) 40 MG tablet Take 1 Tab by mouth every day. 90 Tab 3   • clopidogrel (PLAVIX) 75 MG Tab Take 75 mg by mouth every day.     • hydroCHLOROthiazide (HYDRODIURIL) 12.5 MG tablet Take 1 Tab by mouth every day. 90 Tab 3   • aspirin EC (ECOTRIN) 81 MG Tablet Delayed Response Take 1 Tab by mouth every day. 30 Tab    • nitroglycerin (NITROSTAT) 0.4 MG SL Tab Place 1 Tab under tongue as needed for  "Chest Pain. 25 Tab 0   • [DISCONTINUED] rosuvastatin (CRESTOR) 40 MG tablet Take 1 Tab by mouth every day. 30 Tab 11     No facility-administered encounter medications on file as of 2/18/2020.      Review of Systems   Constitutional: Negative for malaise/fatigue.   Respiratory: Negative for shortness of breath.    Cardiovascular: Negative for chest pain, palpitations, orthopnea, leg swelling and PND.   Gastrointestinal: Negative for abdominal pain.   Musculoskeletal: Positive for myalgias. Negative for falls.   Neurological: Negative for dizziness and loss of consciousness.   Psychiatric/Behavioral: Negative for depression.   All other systems reviewed and are negative.       Objective:   /80 (BP Location: Right arm, Patient Position: Sitting)   Pulse (!) 56   Ht 1.676 m (5' 6\")   Wt 64.9 kg (143 lb)   SpO2 93%   BMI 23.08 kg/m²     Physical Exam   Constitutional: She is oriented to person, place, and time. She appears well-developed and well-nourished. No distress.   HENT:   Head: Normocephalic and atraumatic.   Eyes: Conjunctivae are normal. No scleral icterus.   Neck: Normal range of motion. Neck supple.   Cardiovascular: Normal rate, regular rhythm and normal heart sounds. Exam reveals no gallop and no friction rub.   No murmur heard.  Pulmonary/Chest: Effort normal and breath sounds normal. No respiratory distress. She has no wheezes. She has no rales.   Abdominal: Soft. She exhibits no distension. There is no abdominal tenderness.   Musculoskeletal:         General: No edema.   Neurological: She is alert and oriented to person, place, and time.   Skin: Skin is warm and dry. She is not diaphoretic.   Psychiatric: She has a normal mood and affect. Her behavior is normal.   Nursing note and vitals reviewed.    Echocardiogram in February 2017 showed a normal LV systolic function with an ejection fraction of 65%.    Cardiac catheterization in 2015 showed three-vessel coronary artery disease. Status post " PCI to the mid LAD and 2 branches of the obtuse marginal artery. Ejection fraction at that time was 35%.    Labs performed February 2020 were reviewed and showed creatinine 1.0.  LDL 56.  HDL 60.  Triglycerides 100    Assessment:     1. Coronary artery disease due to lipid rich plaque     2. Dyslipidemia     3. Essential hypertension, benign     4. Peripheral arterial disease (HCC)     5. Myalgia     6. Cigarette smoker     7. Encounter for long-term (current) use of high-risk medication         Medical Decision Making:  Today's Assessment / Status / Plan:     Coronary artery disease: Patient denies any anginal symptoms.  Continue aspirin and Plavix.    Hypertension: Her blood pressure is borderline elevated today.  Usually well controlled.  No medication changes for now.  If blood pressure at home continues to be elevated, he should let us know.  Continue lisinopril, hydrochlorothiazide and amlodipine at current dose.  Her renal function has been normal.    Hyperlipidemia:  Myalgias:  Patient continues to report myalgias despite changing to Crestor.  Will reduce Crestor to 20 mg once daily which is less than ideal given her peripheral and coronary artery disease however she is bothered by her myalgias.    Peripheral arterial disease: Patient denies any claudication symptoms.  Continue with therapy as above.    Tobacco use: Patient continues to smoke on a daily basis.  I have discussed the benefits of smoking cessation.  Patient is not interested in quitting at this time but does think about it.  I spent 4 minutes discussing smoking cessation.    Return to clinic in 6 months or earlier if needed.    Thank you for allowing me to participate in the care of this patient. Please do not hesitate to contact me with any questions.    Patricia Carrington MD  Cardiologist  [facility]      PLEASE NOTE: This dictation was created using voice recognition software.

## 2020-08-11 DIAGNOSIS — I10 ESSENTIAL HYPERTENSION: ICD-10-CM

## 2020-08-11 RX ORDER — AMLODIPINE BESYLATE 5 MG/1
5 TABLET ORAL DAILY
Qty: 90 TAB | Refills: 1 | Status: CANCELLED | OUTPATIENT
Start: 2020-08-11

## 2020-08-12 DIAGNOSIS — I10 ESSENTIAL HYPERTENSION: ICD-10-CM

## 2020-08-18 RX ORDER — AMLODIPINE BESYLATE 5 MG/1
5 TABLET ORAL DAILY
Qty: 90 TAB | Refills: 0 | Status: SHIPPED | OUTPATIENT
Start: 2020-08-18 | End: 2020-12-04

## 2020-11-30 DIAGNOSIS — I10 ESSENTIAL HYPERTENSION: ICD-10-CM

## 2020-12-04 RX ORDER — AMLODIPINE BESYLATE 5 MG/1
TABLET ORAL
Qty: 90 TAB | Refills: 0 | Status: ON HOLD | OUTPATIENT
Start: 2020-12-04 | End: 2022-05-11

## 2021-02-03 ENCOUNTER — TELEPHONE (OUTPATIENT)
Dept: CARDIOLOGY | Facility: MEDICAL CENTER | Age: 71
End: 2021-02-03

## 2021-02-03 NOTE — TELEPHONE ENCOUNTER
Received clearance request from [facility] in [place]. Pt is planning right external iliac artery balloon angioplasty with possible stent via left radial artery with Dr. Wilkes. Pt currently prescribed Plavix and ASA.

## 2021-02-08 NOTE — TELEPHONE ENCOUNTER
Patricia Carrington M.D.  You 3 days ago     He needs to be seen for reevaluation.   Thank you       Faxed above recommendations STAT to [facility], [phone]. Received receipt for confirmation.    Attempted to call pt to discuss, no answer, LM for her to call back. Task sent to schedulers to call pt.

## 2021-12-29 PROBLEM — Z95.0 PRESENCE OF CARDIAC PACEMAKER: Status: ACTIVE | Noted: 2021-11-04

## 2021-12-29 PROBLEM — I48.91 ATRIAL FIBRILLATION (HCC): Status: ACTIVE | Noted: 2021-10-24

## 2021-12-29 PROBLEM — J43.1 PANLOBULAR EMPHYSEMA (HCC): Status: ACTIVE | Noted: 2021-10-07

## 2021-12-29 PROBLEM — G47.34 NOCTURNAL HYPOXIA: Status: ACTIVE | Noted: 2021-04-02

## 2021-12-29 PROBLEM — E78.49 OTHER HYPERLIPIDEMIA: Status: ACTIVE | Noted: 2021-03-04

## 2021-12-29 PROBLEM — R06.09 DYSPNEA ON EXERTION: Status: ACTIVE | Noted: 2021-03-04

## 2021-12-29 PROBLEM — I50.23 ACUTE ON CHRONIC SYSTOLIC CHF (CONGESTIVE HEART FAILURE) (HCC): Status: ACTIVE | Noted: 2021-10-29

## 2021-12-29 PROBLEM — G47.33 OBSTRUCTIVE SLEEP APNEA SYNDROME: Status: ACTIVE | Noted: 2021-04-20

## 2022-05-02 ENCOUNTER — HOSPITAL ENCOUNTER (INPATIENT)
Facility: REHABILITATION | Age: 72
LOS: 10 days | DRG: 057 | End: 2022-05-12
Attending: PHYSICAL MEDICINE & REHABILITATION | Admitting: PHYSICAL MEDICINE & REHABILITATION
Payer: MEDICARE

## 2022-05-02 DIAGNOSIS — Z95.5 S/P CORONARY ARTERY STENT PLACEMENT: ICD-10-CM

## 2022-05-02 DIAGNOSIS — E78.49 OTHER HYPERLIPIDEMIA: ICD-10-CM

## 2022-05-02 DIAGNOSIS — M53.3 SACRAL PAIN: ICD-10-CM

## 2022-05-02 PROBLEM — I63.9 ISCHEMIC STROKE (HCC): Status: ACTIVE | Noted: 2022-05-02

## 2022-05-02 PROBLEM — Z78.9 IMPAIRED MOBILITY AND ADLS: Status: ACTIVE | Noted: 2022-05-02

## 2022-05-02 PROBLEM — Z74.09 IMPAIRED MOBILITY AND ADLS: Status: ACTIVE | Noted: 2022-05-02

## 2022-05-02 PROCEDURE — 770010 HCHG ROOM/CARE - REHAB SEMI PRIVAT*

## 2022-05-02 PROCEDURE — 94760 N-INVAS EAR/PLS OXIMETRY 1: CPT

## 2022-05-02 PROCEDURE — 700102 HCHG RX REV CODE 250 W/ 637 OVERRIDE(OP): Performed by: PHYSICAL MEDICINE & REHABILITATION

## 2022-05-02 PROCEDURE — A9270 NON-COVERED ITEM OR SERVICE: HCPCS | Performed by: PHYSICAL MEDICINE & REHABILITATION

## 2022-05-02 PROCEDURE — 0240U HCHG SARS-COV-2 COVID-19 NFCT DS RESP RNA 3 TRGT MIC: CPT

## 2022-05-02 PROCEDURE — 99223 1ST HOSP IP/OBS HIGH 75: CPT | Performed by: PHYSICAL MEDICINE & REHABILITATION

## 2022-05-02 RX ORDER — MIDAZOLAM HYDROCHLORIDE 5 MG/ML
5 INJECTION INTRAMUSCULAR; INTRAVENOUS PRN
Status: DISCONTINUED | OUTPATIENT
Start: 2022-05-02 | End: 2022-05-12 | Stop reason: HOSPADM

## 2022-05-02 RX ORDER — AMOXICILLIN 250 MG
2 CAPSULE ORAL 2 TIMES DAILY
Status: DISCONTINUED | OUTPATIENT
Start: 2022-05-02 | End: 2022-05-12 | Stop reason: HOSPADM

## 2022-05-02 RX ORDER — HYDRALAZINE HYDROCHLORIDE 10 MG/1
10 TABLET, FILM COATED ORAL EVERY 8 HOURS PRN
Status: DISCONTINUED | OUTPATIENT
Start: 2022-05-02 | End: 2022-05-12 | Stop reason: HOSPADM

## 2022-05-02 RX ORDER — ATORVASTATIN CALCIUM 40 MG/1
40 TABLET, FILM COATED ORAL EVERY EVENING
Status: DISCONTINUED | OUTPATIENT
Start: 2022-05-02 | End: 2022-05-12 | Stop reason: HOSPADM

## 2022-05-02 RX ORDER — LACTULOSE 20 G/30ML
30 SOLUTION ORAL
Status: DISCONTINUED | OUTPATIENT
Start: 2022-05-02 | End: 2022-05-12 | Stop reason: HOSPADM

## 2022-05-02 RX ORDER — HYDROXYZINE HYDROCHLORIDE 25 MG/1
50 TABLET, FILM COATED ORAL EVERY 6 HOURS PRN
Status: DISCONTINUED | OUTPATIENT
Start: 2022-05-02 | End: 2022-05-12 | Stop reason: HOSPADM

## 2022-05-02 RX ORDER — OMEPRAZOLE 20 MG/1
20 CAPSULE, DELAYED RELEASE ORAL
Status: DISCONTINUED | OUTPATIENT
Start: 2022-05-03 | End: 2022-05-12 | Stop reason: HOSPADM

## 2022-05-02 RX ORDER — LISINOPRIL 5 MG/1
10 TABLET ORAL
Status: DISCONTINUED | OUTPATIENT
Start: 2022-05-03 | End: 2022-05-03

## 2022-05-02 RX ORDER — TRAZODONE HYDROCHLORIDE 50 MG/1
50 TABLET ORAL
Status: DISCONTINUED | OUTPATIENT
Start: 2022-05-02 | End: 2022-05-12 | Stop reason: HOSPADM

## 2022-05-02 RX ORDER — POLYVINYL ALCOHOL 14 MG/ML
1 SOLUTION/ DROPS OPHTHALMIC PRN
Status: DISCONTINUED | OUTPATIENT
Start: 2022-05-02 | End: 2022-05-12 | Stop reason: HOSPADM

## 2022-05-02 RX ORDER — ONDANSETRON 4 MG/1
4 TABLET, ORALLY DISINTEGRATING ORAL 4 TIMES DAILY PRN
Status: DISCONTINUED | OUTPATIENT
Start: 2022-05-02 | End: 2022-05-12 | Stop reason: HOSPADM

## 2022-05-02 RX ORDER — ACETAMINOPHEN 325 MG/1
650 TABLET ORAL EVERY 4 HOURS PRN
Status: DISCONTINUED | OUTPATIENT
Start: 2022-05-02 | End: 2022-05-12 | Stop reason: HOSPADM

## 2022-05-02 RX ORDER — CARVEDILOL 3.12 MG/1
3.12 TABLET ORAL 2 TIMES DAILY WITH MEALS
Status: DISCONTINUED | OUTPATIENT
Start: 2022-05-03 | End: 2022-05-03

## 2022-05-02 RX ORDER — ALUMINA, MAGNESIA, AND SIMETHICONE 2400; 2400; 240 MG/30ML; MG/30ML; MG/30ML
20 SUSPENSION ORAL
Status: DISCONTINUED | OUTPATIENT
Start: 2022-05-02 | End: 2022-05-12 | Stop reason: HOSPADM

## 2022-05-02 RX ORDER — BISACODYL 10 MG
10 SUPPOSITORY, RECTAL RECTAL
Status: DISCONTINUED | OUTPATIENT
Start: 2022-05-02 | End: 2022-05-12 | Stop reason: HOSPADM

## 2022-05-02 RX ORDER — ECHINACEA PURPUREA EXTRACT 125 MG
2 TABLET ORAL PRN
Status: DISCONTINUED | OUTPATIENT
Start: 2022-05-02 | End: 2022-05-12 | Stop reason: HOSPADM

## 2022-05-02 RX ORDER — ONDANSETRON 2 MG/ML
4 INJECTION INTRAMUSCULAR; INTRAVENOUS 4 TIMES DAILY PRN
Status: DISCONTINUED | OUTPATIENT
Start: 2022-05-02 | End: 2022-05-12 | Stop reason: HOSPADM

## 2022-05-02 RX ORDER — LANOLIN ALCOHOL/MO/W.PET/CERES
3 CREAM (GRAM) TOPICAL NIGHTLY PRN
Status: DISCONTINUED | OUTPATIENT
Start: 2022-05-02 | End: 2022-05-12 | Stop reason: HOSPADM

## 2022-05-02 RX ORDER — POLYETHYLENE GLYCOL 3350 17 G/17G
1 POWDER, FOR SOLUTION ORAL
Status: DISCONTINUED | OUTPATIENT
Start: 2022-05-02 | End: 2022-05-12 | Stop reason: HOSPADM

## 2022-05-02 RX ORDER — CLOPIDOGREL BISULFATE 75 MG/1
75 TABLET ORAL DAILY
Status: DISCONTINUED | OUTPATIENT
Start: 2022-05-03 | End: 2022-05-12 | Stop reason: HOSPADM

## 2022-05-02 RX ADMIN — SENNOSIDES AND DOCUSATE SODIUM 2 TABLET: 50; 8.6 TABLET ORAL at 19:52

## 2022-05-02 RX ADMIN — ATORVASTATIN CALCIUM 40 MG: 40 TABLET, FILM COATED ORAL at 19:52

## 2022-05-02 ASSESSMENT — LIFESTYLE VARIABLES
AVERAGE NUMBER OF DAYS PER WEEK YOU HAVE A DRINK CONTAINING ALCOHOL: 0
EVER HAD A DRINK FIRST THING IN THE MORNING TO STEADY YOUR NERVES TO GET RID OF A HANGOVER: NO
ALCOHOL_USE: NO
EVER FELT BAD OR GUILTY ABOUT YOUR DRINKING: NO
TOTAL SCORE: 0
ON A TYPICAL DAY WHEN YOU DRINK ALCOHOL HOW MANY DRINKS DO YOU HAVE: 0
HAVE PEOPLE ANNOYED YOU BY CRITICIZING YOUR DRINKING: NO
CONSUMPTION TOTAL: NEGATIVE
HAVE YOU EVER FELT YOU SHOULD CUT DOWN ON YOUR DRINKING: NO
HOW MANY TIMES IN THE PAST YEAR HAVE YOU HAD 5 OR MORE DRINKS IN A DAY: 0
TOTAL SCORE: 0
EVER_SMOKED: YES
TOTAL SCORE: 0

## 2022-05-02 ASSESSMENT — PAIN DESCRIPTION - PAIN TYPE: TYPE: OTHER (COMMENT)

## 2022-05-02 ASSESSMENT — PATIENT HEALTH QUESTIONNAIRE - PHQ9
2. FEELING DOWN, DEPRESSED, IRRITABLE, OR HOPELESS: NOT AT ALL
1. LITTLE INTEREST OR PLEASURE IN DOING THINGS: NOT AT ALL
SUM OF ALL RESPONSES TO PHQ9 QUESTIONS 1 AND 2: 0

## 2022-05-02 NOTE — H&P
REHABILITATION HISTORY AND PHYSICAL/POST ADMISSION EVALUATION    5/2/2022  4:40 PM  Ashlee Sepulveda  RH21/02  Admission  5/2/2022  3:48 PM  [facility] Code/Reason for admission: 0001.1 - Stroke: Left Body Involvement (Right Brain)   Etiologic diagnosis/problem: Ischemic stroke (HCC)  Chief Complaint: Left hemiparesis    HPI:  The patient is a 72 y.o. female with a past medical history of coronary artery disease status post stent x3 on aspirin, chronic systolic CHF, COPD, hypertension, hyperlipidemia, severe peripheral artery disease on Eliquis, chronic kidney disease stage III, sleep apnea; now admitted for acute inpatient rehabilitation with severe functional debility after an acute stroke.      On admission the patient and medical record report patient was admitted to [facility] on 4/29 from [place] with acute onset of left-sided weakness and slurred speech.  She was on aspirin and Eliquis.  Imaging showed negative head CT, MRI with acute right frontal and parietal watershed strokes.  She had carotid ultrasounds which were negative for any significant stenosis.  She had an echocardiogram which showed ejection fraction of 35 to 40% as well as mild pericardial effusion.  Patient was also noted to have a left hip hematoma from her fall.  Labs showed anemia with a hemoglobin of 11.5.  She also had acute on chronic kidney injury with elevated BUN, creatinine, and potassium.  Recommendations was to add Plavix to her Eliquis and aspirin regimen.  She was given IV fluids for her acute kidney injury.    Patient current reports improving left hemiparesis.  She is right-hand dominant.  She denies any changes in her vision or paresthesias.  Denies any issues with her bowel or bladder.  Confirm she was previously on aspirin and Eliquis prior to the stroke.    Patient was evaluated by Rehab Medicine physician and Physical Therapy, Occupational Therapy and Speech Therapy and determined to be appropriate for acute inpatient  rehab and was transferred to [facility] on 5/2/2022  3:48 PM.    With this acute therapeutic intervention, this patient hopes to improve her functional status, and return to independent living with the supportive care of family.    REVIEW OF SYSTEMS:     A complete review of systems was performed and was negative in detail with the exception of items mentioned elsewhere in this document.    PMH:  Past Medical History:   Diagnosis Date   • Arthritis    • CHF (congestive heart failure) ([facility])    • COPD (chronic obstructive pulmonary disease) ([facility])    • Coronary artery disease involving native coronary artery of native heart without angina pectoris 2/15/2017   • Dyslipidemia 2/15/2017   • Essential hypertension, benign 2/15/2017   • Hypertension    • Ischemic cardiomyopathy 10/7/2015   • Kidney disease    • Myocardial infarct (HCC)    • Peripheral vascular disease (HCC)    • Rectal cancer (HCC)     10 years ago   • S/P coronary artery stent placement 2/15/2017   • Tobacco use        PSH:  Past Surgical History:   Procedure Laterality Date   • CAROTID ENDARTERECTOMY     • FEMORAL POPLITEAL BYPASS     • STENT PLACEMENT      Coronary artery x3, bilateral iliac arteries       Family History   Problem Relation Age of Onset   • Heart Attack Mother         MEDICATIONS:  Current Facility-Administered Medications   Medication Dose   • hydrOXYzine HCl (ATARAX) tablet 50 mg  50 mg   • melatonin tablet 3 mg  3 mg   • Respiratory Therapy Consult     • Pharmacy Consult Request ...Pain Management Review 1 Each  1 Each   • hydrALAZINE (APRESOLINE) tablet 10 mg  10 mg   • acetaminophen (Tylenol) tablet 650 mg  650 mg   • senna-docusate (PERICOLACE or SENOKOT S) 8.6-50 MG per tablet 2 Tablet  2 Tablet    And   • polyethylene glycol/lytes (MIRALAX) PACKET 1 Packet  1 Packet    And   • magnesium hydroxide (MILK OF MAGNESIA) suspension 30 mL  30 mL    And   • bisacodyl (DULCOLAX) suppository 10 mg  10 mg   • lactulose 20  "GM/30ML solution 30 mL  30 mL   • [START ON 5/3/2022] omeprazole (PRILOSEC) capsule 20 mg  20 mg   • artificial tears ophthalmic solution 1 Drop  1 Drop   • benzocaine-menthol (Cepacol) lozenge 1 Lozenge  1 Lozenge   • mag hydrox-al hydrox-simeth (MAALOX PLUS ES or MYLANTA DS) suspension 20 mL  20 mL   • ondansetron (ZOFRAN ODT) dispertab 4 mg  4 mg    Or   • ondansetron (ZOFRAN) syringe/vial injection 4 mg  4 mg   • traZODone (DESYREL) tablet 50 mg  50 mg   • sodium chloride (OCEAN) 0.65 % nasal spray 2 Spray  2 Spray   • [START ON 5/3/2022] aspirin EC (ECOTRIN) tablet 81 mg  81 mg   • [START ON 5/3/2022] clopidogrel (PLAVIX) tablet 75 mg  75 mg   • midazolam (VERSED) 5 mg/mL (1 mL vial)  5 mg       ALLERGIES:  Chlorhexidine    PSYCHOSOCIAL HISTORY:  Pre-mobidly, the patient lived in a single level home with 4-5 steps to enter, in [place] with spouse.  Patient been  for over 30 years.  She has several children and grandchildren.  She used to work as a .  She is quit alcohol and tobacco.    LEVEL OF FUNCTION PRIOR TO DISABILTY:  Independent    LEVEL OF FUNCTION PRIOR TO ADMISSION to [facility]:  PT:    Min assist to ambulate 10 feet with a front wheel walker requiring a two-person assist  Mod assist for transfers    OT:    Max assist toileting    SLP:    No dysphagia  No cognitive assessment completed    CURRENT LEVEL OF FUNCTION:   Same as level of function prior to admission to [facility]    PHYSICAL EXAM:     VITAL SIGNS:   height is 1.676 m (5' 6\") and weight is 59 kg (130 lb). Her oral temperature is 36.4 °C (97.6 °F). Her blood pressure is 126/75 and her pulse is 74. Her respiration is 20 and oxygen saturation is 95%.     GENERAL: No apparent distress  HEENT: Normocephalic/atraumatic, EOMI, PERRL and No nystagmus, moist mucous membranes  CARDIAC: Regular rate and rhythm, normal S1, S2, no murmurs, no peripheral edema   LUNGS: Clear to auscultation, " normal respiratory effort, on room air   ABDOMINAL: bowel sounds present, soft, nontender and nondistended    EXTREMITIES: no edema or 2+ bilateral DP/PT pulses, thick dystrophic toenails bilaterally    NEURO:    Mental status: Alert  Speech: fluent, no aphasia, minimal dysarthria    CRANIAL NERVES:  2,3: visual acuity grossly intact, PERRL  3,4,6: EOMI bilaterally, no nystagmus or diplopia  5: intact in all branches  7: Very mild decrease in left nasolabial fold  8: hearing grossly intact  9,10: symmetric palate elevation  11: SCM/Trapezius strength 5/5 bilaterally  12: tongue protrudes midline    Motor:  Shoulder flexors:  Right -  5/5, Left -  4/5  Elbow flexors:  Right -  5/5, Left -  4/5  Elbow extensors:  Right -  5/5, Left -  4/5  Symmetrical   Hip flexors:  Right -  5/5, Left -  4/5  Knee ext:  Right -  5/5, Left -  4/5  Dorsiflexors:  Right -  5/5, Left -  5/5  EHL:  Right -  4/5, Left - 5/5  Plantar flexors:  Right -  5/5, Left -  4/5   Positive pronator drift on the left    Sensory:   Decreased to light touch in the right toes, otherwise normal sensation to light touch throughout    DTRs:   1+ in bilateral biceps, triceps, brachioradialis  2+ in bilateral patellar tendons and 2+ in bilateral achilles tendons  No clonus at bilateral ankles  Negative babinski b/l  Negative Salvador b/l       RADIOLOGY:        Per HPI.                                                  LABS:    Per HPI.    PRIMARY REHAB DIAGNOSIS:    This patient is a 72 y.o. female admitted for acute inpatient rehabilitation with Ischemic stroke (HCC).    IMPAIRMENTS:   Cognitive  ADLs/IADLs  Mobility  Speech    SECONDARY DIAGNOSIS/MEDICAL CO-MORBIDITIES AFFECTING FUNCTION:    Hypertension  Chronic systolic CHF  Chronic kidney disease  COPD  Peripheral vascular disease  Sleep apnea  Anemia      RELEVANT CHANGES SINCE PREADMISSION EVALUATION:    Status unchanged    The patient's rehabilitation potential is Very Good  The patient's medical  prognosis is good    PLAN:   Discussion and Recommendations, discussed with the patient and/or family:   1. The patient requires an acute inpatient rehabilitation program with a coordinated program of care at an intensity and frequency not available at a lower level of care. This recommendation is substantiated by the patient's medical physicians who recommend that the patient's intervention and assessment of medical issues needs to be done at an acute level of care for patient's safety and maximum outcome.     2. A coordinated program of care will be supplied by an interdisciplinary team of physical therapy, occupational therapy, rehab physician, rehab nursing, and, if needed, speech therapy and rehab psychology. Rehab team presents a patient-specific rehabilitation and education program concentrating on prevention of future problems related to accessibility, mobility, skin, bowel, bladder, sexuality, and psychosocial and medical/surgical problems.     3. Need for Rehabilitation Physician: The rehab physician will be evaluating the patient on a multi-weekly basis to help coordinate the program of care. The rehab physician communicates between medical physicians, therapists, and nurses to maximize the patient's potential outcome. Specific areas in which the rehab physician will be providing daily assessment include the following:   A. Assessing the patient's heart rate and blood pressure response (vitals monitoring) to activity and making adjustments in medications or conservative measures as needed.   B. The rehab physician will be assessing the frequency at which the program can be increased to allow the patient to reach optimal functional outcome.   C. The rehab physician will also provide assessments in daily skin care, especially in light of patient's impairments in mobility.   D. The rehab physician will provide special expertise in understanding how to work with functional impairment and recommend appropriate  interventions, compensatory techniques, and education that will facilitate the patient's outcome.     4. Rehab R.N.   The rehab RN will be working with patient to carry over in room mobility and activities of daily living when the patient is not in 3 hours of skilled therapy. Rehab nursing will be working in conjunction with rehab physician to address all the medical issues above and continue to assess laboratory work and discuss abnormalities with the treating physicians, assess vitals, and response to activity, and discuss and report abnormalities with the rehab physician. Rehab RN will also continue daily skin care, supervise bladder/bowel program, instruct in medication administration, and ensure patient safety.     5. Therapies to treat at intensity and frequency of (may change after completion of evaluation by all therapeutic disciplines):       PT:  Physical therapy to address mobility, transfer, gait training and evaluation for adaptive equipment needs 1hour/day at least 5 days/week for the duration of the ELOS (see below)       OT:  Occupational therapy to address ADLs, self-care, home management training, functional mobility/transfers and assistive device evaluation, and community re-integration 1hour/day at least 5 days/week for the duration of the ELOS (see below).        ST/Dysphagia:  Speech therapy to address speech, language, and cognitive deficits as well as swallowing difficulties with retraining/dysphagia management and community re-integration with comprehension, expression, cognitive training 1hour/day at least 5 days/week for the duration of the ELOS (see below).     6. Medical management / Rehabilitation Issues/Adverse Potential affecting function as part of rehabilitation plan.    Hypertension  Home medications included carvedilol 6.25 mg twice daily, lisinopril 20 mg daily, Lasix 20 mg daily, spironolactone 25 mg daily  Consult hospitalist  At [facility] hospital patient did receive lisinopril as  well as carvedilol, will start low-doses due to watershed stroke, lisinopril 10 mg daily, carvedilol 3.125 mg daily    Chronic systolic CHF  Ejection fraction of 35 to 40%  Lisinopril  Previously on Lasix 20 mg daily and spironolactone 25 mg daily  Check BNP    Coronary artery disease  History of 3 coronary stents  Aspirin  Plavix  Statin  Eliquis    Chronic kidney disease  Avoid nephrotoxins  Renally dose medications     COPD  Respiratory therapist evaluation    Peripheral vascular disease  History of stents in the bilateral iliac arteries  Aspirin  Plavix  Statin  Eliquis    Sleep apnea  Did not tolerate CPAP    Anemia  Check a.m. labs    I performed a complete drug regimen review and did not identify any potential clinically significant medication issues.    The patient's CODE STATUS was confirmed as FULL CODE on admission, with the patient and/or family at bedside.    REHABILITATION ISSUES/ADVERSE POTENTIAL:  1.  CVA (Cerebrovascular Accident): Continue Eliquis, aspirin, Plavix for secondary prophylaxis as well as lipid and blood pressure management. Patient demonstrates functional deficits in strength, balance, coordination, and ADL's. Patient is admitted to [facility] for comprehensive rehabilitation therapy as described below.   Rehabilitation nursing monitors bowel and bladder control, educates on medication administration, co-morbidities and monitors patient safety.    2.  DVT prophylaxis:  Patient is on Eliquis for anticoagulation upon transfer. Encourage OOB. Monitor daily for signs and symptoms of DVT including but not limited to swelling and pain to prevent the development of DVT that may interfere with therapies.    3.  Pain: No issues with pain currently / Controlled with as needed oral analgesics.    4.  Nutrition/Dysphagia: Dietician monitors nutrient intake, recommend supplements prn and provide nutrition education to pt/family to promote optimal nutrition for wound  healing/recovery.     5.  Bladder/bowel:  Start bowel and bladder program, to prevent constipation, urinary retention (which may lead to UTI), and urinary incontinence (which will impact upon pt's functional independence).   - TV Q3h while awake with post void bladder scans, I&O cath for PVRs >400  - up to commode after meal     6.  Skin/dermal ulcer prophylaxis: Monitor for new skin conditions with q.2 h. turns as required to prevent the development of skin breakdown.     7.  GI prophylaxis: Omeprazole to prevent gastritis or GI bleed that would interfere with therapies.    8. Respiratory therapy: RT performs O2 management prn, breathing retraining, pulmonary hygiene and bronchospasm management prn to optimize participation in therapies.    Pt was seen today for 73 min, and entire time spent in face-to-face contact was >50% in counseling and coordination of care as detailed in A/P above.        GOALS/EXPECTED LEVEL OF FUNCTION BASED ON CURRENT MEDICAL AND FUNCTIONAL STATUS (may change based on patient's medical status and rate of impairment recovery):  Transfers:   Supervision  Mobility/Gait:   Supervision  ADL's:   Supervision  Cognition: Least verbal cues    DISPOSITION: Discharge to pre-morbid independent living setting with the supportive care of patient's spouse.      ELOS: 14-21 days    Abida Boyce M.D.  Physical Medicine and Rehabilitation

## 2022-05-03 ENCOUNTER — APPOINTMENT (OUTPATIENT)
Dept: RADIOLOGY | Facility: REHABILITATION | Age: 72
DRG: 057 | End: 2022-05-03
Attending: PHYSICAL MEDICINE & REHABILITATION
Payer: MEDICARE

## 2022-05-03 PROBLEM — N18.9 CKD (CHRONIC KIDNEY DISEASE): Status: ACTIVE | Noted: 2022-05-03

## 2022-05-03 PROBLEM — E87.5 HIGH POTASSIUM: Status: ACTIVE | Noted: 2022-05-03

## 2022-05-03 PROBLEM — D72.829 LEUKOCYTOSIS: Status: ACTIVE | Noted: 2022-05-03

## 2022-05-03 PROBLEM — E55.9 VITAMIN D DEFICIENCY: Status: ACTIVE | Noted: 2022-05-03

## 2022-05-03 PROBLEM — I50.22 SYSTOLIC CHF, CHRONIC (HCC): Status: ACTIVE | Noted: 2022-05-03

## 2022-05-03 LAB
25(OH)D3 SERPL-MCNC: 15 NG/ML (ref 30–100)
ALBUMIN SERPL BCP-MCNC: 3.7 G/DL (ref 3.2–4.9)
ALBUMIN/GLOB SERPL: 1.6 G/DL
ALP SERPL-CCNC: 48 U/L (ref 30–99)
ALT SERPL-CCNC: 14 U/L (ref 2–50)
ANION GAP SERPL CALC-SCNC: 13 MMOL/L (ref 7–16)
APPEARANCE UR: CLEAR
AST SERPL-CCNC: 6 U/L (ref 12–45)
BACTERIA #/AREA URNS HPF: NEGATIVE /HPF
BASOPHILS # BLD AUTO: 0.2 % (ref 0–1.8)
BASOPHILS # BLD: 0.03 K/UL (ref 0–0.12)
BILIRUB SERPL-MCNC: 0.4 MG/DL (ref 0.1–1.5)
BILIRUB UR QL STRIP.AUTO: NEGATIVE
BUN SERPL-MCNC: 41 MG/DL (ref 8–22)
CALCIUM SERPL-MCNC: 9 MG/DL (ref 8.5–10.5)
CHLORIDE SERPL-SCNC: 104 MMOL/L (ref 96–112)
CHOLEST SERPL-MCNC: 142 MG/DL (ref 100–199)
CO2 SERPL-SCNC: 19 MMOL/L (ref 20–33)
COLOR UR: YELLOW
CREAT SERPL-MCNC: 1.28 MG/DL (ref 0.5–1.4)
EOSINOPHIL # BLD AUTO: 0.19 K/UL (ref 0–0.51)
EOSINOPHIL NFR BLD: 1.4 % (ref 0–6.9)
EPI CELLS #/AREA URNS HPF: ABNORMAL /HPF
ERYTHROCYTE [DISTWIDTH] IN BLOOD BY AUTOMATED COUNT: 51.1 FL (ref 35.9–50)
EST. AVERAGE GLUCOSE BLD GHB EST-MCNC: 117 MG/DL
FLUAV RNA SPEC QL NAA+PROBE: NEGATIVE
FLUBV RNA SPEC QL NAA+PROBE: NEGATIVE
GFR SERPLBLD CREATININE-BSD FMLA CKD-EPI: 44 ML/MIN/1.73 M 2
GLOBULIN SER CALC-MCNC: 2.3 G/DL (ref 1.9–3.5)
GLUCOSE SERPL-MCNC: 96 MG/DL (ref 65–99)
GLUCOSE UR STRIP.AUTO-MCNC: NEGATIVE MG/DL
HBA1C MFR BLD: 5.7 % (ref 4–5.6)
HCT VFR BLD AUTO: 36.4 % (ref 37–47)
HDLC SERPL-MCNC: 52 MG/DL
HGB BLD-MCNC: 11.7 G/DL (ref 12–16)
HYALINE CASTS #/AREA URNS LPF: ABNORMAL /LPF
IMM GRANULOCYTES # BLD AUTO: 0.12 K/UL (ref 0–0.11)
IMM GRANULOCYTES NFR BLD AUTO: 0.9 % (ref 0–0.9)
IRON SATN MFR SERPL: 18 % (ref 15–55)
IRON SERPL-MCNC: 49 UG/DL (ref 40–170)
KETONES UR STRIP.AUTO-MCNC: NEGATIVE MG/DL
LDLC SERPL CALC-MCNC: 72 MG/DL
LEUKOCYTE ESTERASE UR QL STRIP.AUTO: ABNORMAL
LYMPHOCYTES # BLD AUTO: 2.24 K/UL (ref 1–4.8)
LYMPHOCYTES NFR BLD: 16.8 % (ref 22–41)
MAGNESIUM SERPL-MCNC: 2.5 MG/DL (ref 1.5–2.5)
MCH RBC QN AUTO: 30.3 PG (ref 27–33)
MCHC RBC AUTO-ENTMCNC: 32.1 G/DL (ref 33.6–35)
MCV RBC AUTO: 94.3 FL (ref 81.4–97.8)
MICRO URNS: ABNORMAL
MONOCYTES # BLD AUTO: 1.05 K/UL (ref 0–0.85)
MONOCYTES NFR BLD AUTO: 7.9 % (ref 0–13.4)
NEUTROPHILS # BLD AUTO: 9.68 K/UL (ref 2–7.15)
NEUTROPHILS NFR BLD: 72.8 % (ref 44–72)
NITRITE UR QL STRIP.AUTO: NEGATIVE
NRBC # BLD AUTO: 0 K/UL
NRBC BLD-RTO: 0 /100 WBC
NT-PROBNP SERPL IA-MCNC: 1022 PG/ML (ref 0–125)
PH UR STRIP.AUTO: 5 [PH] (ref 5–8)
PLATELET # BLD AUTO: 298 K/UL (ref 164–446)
PMV BLD AUTO: 10.5 FL (ref 9–12.9)
POTASSIUM SERPL-SCNC: 5.2 MMOL/L (ref 3.6–5.5)
PROT SERPL-MCNC: 6 G/DL (ref 6–8.2)
PROT UR QL STRIP: NEGATIVE MG/DL
RBC # BLD AUTO: 3.86 M/UL (ref 4.2–5.4)
RBC # URNS HPF: ABNORMAL /HPF
RBC UR QL AUTO: NEGATIVE
SARS-COV-2 RNA RESP QL NAA+PROBE: NOTDETECTED
SODIUM SERPL-SCNC: 136 MMOL/L (ref 135–145)
SP GR UR STRIP.AUTO: 1.02
SPECIMEN SOURCE: NORMAL
TIBC SERPL-MCNC: 277 UG/DL (ref 250–450)
TRIGL SERPL-MCNC: 92 MG/DL (ref 0–149)
UIBC SERPL-MCNC: 228 UG/DL (ref 110–370)
UROBILINOGEN UR STRIP.AUTO-MCNC: 0.2 MG/DL
WBC # BLD AUTO: 13.3 K/UL (ref 4.8–10.8)
WBC #/AREA URNS HPF: ABNORMAL /HPF

## 2022-05-03 PROCEDURE — 80053 COMPREHEN METABOLIC PANEL: CPT

## 2022-05-03 PROCEDURE — 97162 PT EVAL MOD COMPLEX 30 MIN: CPT

## 2022-05-03 PROCEDURE — 97535 SELF CARE MNGMENT TRAINING: CPT

## 2022-05-03 PROCEDURE — 83540 ASSAY OF IRON: CPT

## 2022-05-03 PROCEDURE — 99233 SBSQ HOSP IP/OBS HIGH 50: CPT | Performed by: PHYSICAL MEDICINE & REHABILITATION

## 2022-05-03 PROCEDURE — 83550 IRON BINDING TEST: CPT

## 2022-05-03 PROCEDURE — 36415 COLL VENOUS BLD VENIPUNCTURE: CPT

## 2022-05-03 PROCEDURE — 87086 URINE CULTURE/COLONY COUNT: CPT

## 2022-05-03 PROCEDURE — 83735 ASSAY OF MAGNESIUM: CPT

## 2022-05-03 PROCEDURE — 81001 URINALYSIS AUTO W/SCOPE: CPT

## 2022-05-03 PROCEDURE — 85025 COMPLETE CBC W/AUTO DIFF WBC: CPT

## 2022-05-03 PROCEDURE — 97166 OT EVAL MOD COMPLEX 45 MIN: CPT

## 2022-05-03 PROCEDURE — 83036 HEMOGLOBIN GLYCOSYLATED A1C: CPT

## 2022-05-03 PROCEDURE — 82306 VITAMIN D 25 HYDROXY: CPT

## 2022-05-03 PROCEDURE — 770010 HCHG ROOM/CARE - REHAB SEMI PRIVAT*

## 2022-05-03 PROCEDURE — 700102 HCHG RX REV CODE 250 W/ 637 OVERRIDE(OP): Performed by: PHYSICAL MEDICINE & REHABILITATION

## 2022-05-03 PROCEDURE — 97530 THERAPEUTIC ACTIVITIES: CPT

## 2022-05-03 PROCEDURE — 92523 SPEECH SOUND LANG COMPREHEN: CPT | Performed by: SPEECH-LANGUAGE PATHOLOGIST

## 2022-05-03 PROCEDURE — A9270 NON-COVERED ITEM OR SERVICE: HCPCS | Performed by: HOSPITALIST

## 2022-05-03 PROCEDURE — 80061 LIPID PANEL: CPT

## 2022-05-03 PROCEDURE — 99223 1ST HOSP IP/OBS HIGH 75: CPT | Performed by: HOSPITALIST

## 2022-05-03 PROCEDURE — 71045 X-RAY EXAM CHEST 1 VIEW: CPT

## 2022-05-03 PROCEDURE — A9270 NON-COVERED ITEM OR SERVICE: HCPCS | Performed by: PHYSICAL MEDICINE & REHABILITATION

## 2022-05-03 PROCEDURE — 83880 ASSAY OF NATRIURETIC PEPTIDE: CPT

## 2022-05-03 PROCEDURE — 700102 HCHG RX REV CODE 250 W/ 637 OVERRIDE(OP): Performed by: HOSPITALIST

## 2022-05-03 RX ORDER — LISINOPRIL 5 MG/1
5 TABLET ORAL
Status: DISCONTINUED | OUTPATIENT
Start: 2022-05-04 | End: 2022-05-03

## 2022-05-03 RX ORDER — VITAMIN B COMPLEX
1000 TABLET ORAL DAILY
Status: DISCONTINUED | OUTPATIENT
Start: 2022-05-03 | End: 2022-05-12 | Stop reason: HOSPADM

## 2022-05-03 RX ADMIN — OMEPRAZOLE 20 MG: 20 CAPSULE, DELAYED RELEASE ORAL at 08:28

## 2022-05-03 RX ADMIN — CLOPIDOGREL BISULFATE 75 MG: 75 TABLET ORAL at 08:28

## 2022-05-03 RX ADMIN — ASPIRIN 81 MG: 81 TABLET, COATED ORAL at 08:28

## 2022-05-03 RX ADMIN — LISINOPRIL 10 MG: 5 TABLET ORAL at 05:38

## 2022-05-03 RX ADMIN — ATORVASTATIN CALCIUM 40 MG: 40 TABLET, FILM COATED ORAL at 19:29

## 2022-05-03 RX ADMIN — Medication 1000 UNITS: at 14:24

## 2022-05-03 ASSESSMENT — BRIEF INTERVIEW FOR MENTAL STATUS (BIMS)
BIMS SUMMARY SCORE: 13
WHAT MONTH IS IT: ACCURATE WITHIN 5 DAYS
ASKED TO RECALL SOCK: NO, COULD NOT RECALL
ASKED TO RECALL BED: YES, NO CUE REQUIRED
INITIAL REPETITION OF BED BLUE SOCK - FIRST ATTEMPT: 3
WHAT YEAR IS IT: CORRECT
WHAT DAY OF THE WEEK IS IT: CORRECT
ASKED TO RECALL BLUE: YES, NO CUE REQUIRED

## 2022-05-03 ASSESSMENT — GAIT ASSESSMENTS
DISTANCE (FEET): 50
ASSISTIVE DEVICE: FRONT WHEEL WALKER
GAIT LEVEL OF ASSIST: MINIMAL ASSIST
DEVIATION: ATAXIC;DECREASED BASE OF SUPPORT;BRADYKINETIC;DECREASED HEEL STRIKE;OTHER (COMMENT)

## 2022-05-03 ASSESSMENT — ACTIVITIES OF DAILY LIVING (ADL)
TOILET_TRANSFER_DESCRIPTION: INCREASED TIME;INITIAL PREPARATION FOR TASK;REQUIRES LIFT;SET-UP OF EQUIPMENT;SUPERVISION FOR SAFETY;VERBAL CUEING
TOILETING: INDEPENDENT
BED_CHAIR_WHEELCHAIR_TRANSFER_DESCRIPTION: INCREASED TIME;INITIAL PREPARATION FOR TASK;REQUIRES LIFT;SET-UP OF EQUIPMENT;SUPERVISION FOR SAFETY;VERBAL CUEING
BED_CHAIR_WHEELCHAIR_TRANSFER_DESCRIPTION: INCREASED TIME;INITIAL PREPARATION FOR TASK;SET-UP OF EQUIPMENT;VERBAL CUEING

## 2022-05-03 NOTE — PROGRESS NOTES
Patient care assumed. Report received from [facility] KATARZYNA [place]. Patient is alert and calm, resting in bed. Call light and bedside table within reach. Will continue to monitor.

## 2022-05-03 NOTE — CONSULTS
DATE OF SERVICE:  5/3/2022    REQUESTING PHYSICIAN:  Abida Boyce MD    CHIEF COMPLAINT / REASON FOR CONSULTATION:   Hypertension  Leukocytosis  CKD    HISTORY OF PRESENT ILLNESS:  This is a 73 y/o female with a PMH significant forf CAD with hx of stent x 3, chronic systolic CHF, COPD, hypertension, hyperlipidemia, PAD, CKD stage III, and sleep apnea who was admitted to [facility] on 4/29 from [place] with acute onset of left-sided weakness and slurred speech.  She was on aspirin and Eliquis.  Imaging showed negative head CT.  An MRI showed acute right frontal and parietal watershed strokes.  She had carotid ultrasounds which were negative for any significant stenosis.  An echo showed an EF of 35 to 40% as well as mild pericardial effusion.  Patient was also noted to have a left hip hematoma from her fall.  She also had acute on chronic kidney injury with elevated BUN, creatinine, and potassium.  Recommendations was to add Plavix to her Eliquis and aspirin regimen.  She was given IV fluids for her acute kidney injury.     Because of the patient's weakness and debility, Rehab was consulted, evaluated the patient, and was deemed a good Rehab candidate.  The patient was transferred over to the Rehab facility on 5/2/2022.      The patient denies fever, chills, nausea, vomiting, headaches, blurry vision, or chest pain.    REVIEW OF SYSTEMS: All review of systems are negative pre AMA and CMS criteria except for that stated in the HPI.    PAST MEDICAL HISTORY:  Past Medical History:   Diagnosis Date   • Arthritis    • CHF (congestive heart failure) (HCC)    • COPD (chronic obstructive pulmonary disease) (HCC)    • Coronary artery disease involving native coronary artery of native heart without angina pectoris 2/15/2017   • Dyslipidemia 2/15/2017   • Essential hypertension, benign 2/15/2017   • Hypertension    • Ischemic cardiomyopathy 10/7/2015   • Kidney disease    • Myocardial infarct (HCC)    •  Peripheral vascular disease (HCC)    • Rectal cancer (HCC)     10 years ago   • S/P coronary artery stent placement 2/15/2017   • Tobacco use        PAST SURGICAL HISTORY:  Past Surgical History:   Procedure Laterality Date   • CAROTID ENDARTERECTOMY     • FEMORAL POPLITEAL BYPASS     • PACEMAKER INSERTION     • STENT PLACEMENT      Coronary artery x3, bilateral iliac arteries       Allergies   Allergen Reactions   • Chlorhexidine Rash       CURRENT MEDICATIONS:    Current Facility-Administered Medications:   •  [START ON 2022] lisinopril  •  hydrOXYzine HCl  •  melatonin  •  Respiratory Therapy Consult  •  Pharmacy Consult Request  •  hydrALAZINE  •  acetaminophen  •  senna-docusate **AND** polyethylene glycol/lytes **AND** magnesium hydroxide **AND** bisacodyl  •  lactulose  •  omeprazole  •  artificial tears  •  benzocaine-menthol  •  mag hydrox-al hydrox-simeth  •  ondansetron **OR** ondansetron  •  traZODone  •  sodium chloride  •  aspirin EC  •  clopidogrel  •  midazolam  •  atorvastatin    Social History     Socioeconomic History   • Marital status:    Tobacco Use   • Smoking status: Current Every Day Smoker     Packs/day: 1.00     Years: 20.00     Pack years: 20.00     Types: Cigarettes     Last attempt to quit: 2015     Years since quittin.5   • Smokeless tobacco: Never Used   Vaping Use   • Vaping Use: Never used   Substance and Sexual Activity   • Alcohol use: Yes     Comment: occ - once a month or less   • Drug use: No       FAMILY HISTORY:  was reviewed and is not pertinent to this consultation.    PHYSICAL EXAMINATION:  VITAL SIGNS:  Temp is 97.5, blood pressure is 101/59, heart rate is 70, respiratory rate is 20.  GENERAL:  Patient was lying in bed in no distress.  HEENT:  Pupils were equal, round and reactive to light and accomodation.  Oral mucosa was pink and moist.  NECK:  Soft.  Supple.  No JVD.  HEART:  Regular rate and rhythm.  Normal S1 and S2.  No murmurs were  appreciated.  LUNGS:  Are clear to auscultation bilaterally.  ABDOMEN:  Soft, non tender, non distended.  Bowels sound were positive in all four quadrants.  EXTREMITIES:  No clubbing, cyanosis.  There was no lower extremity edema.  NEUROLOGIC:  Cranial nerves two through twelve were grossly intact.    LABS:  Lab Results   Component Value Date/Time    SODIUM 136 05/03/2022 06:03 AM    POTASSIUM 5.2 05/03/2022 06:03 AM    CHLORIDE 104 05/03/2022 06:03 AM    CO2 19 (L) 05/03/2022 06:03 AM    GLUCOSE 96 05/03/2022 06:03 AM    BUN 41 (H) 05/03/2022 06:03 AM    CREATININE 1.28 05/03/2022 06:03 AM      Lab Results   Component Value Date/Time    WBC 13.3 (H) 05/03/2022 06:03 AM    RBC 3.86 (L) 05/03/2022 06:03 AM    HEMOGLOBIN 11.7 (L) 05/03/2022 06:03 AM    HEMATOCRIT 36.4 (L) 05/03/2022 06:03 AM    MCV 94.3 05/03/2022 06:03 AM    MCH 30.3 05/03/2022 06:03 AM    MCHC 32.1 (L) 05/03/2022 06:03 AM    MPV 10.5 05/03/2022 06:03 AM    NEUTSPOLYS 72.80 (H) 05/03/2022 06:03 AM    LYMPHOCYTES 16.80 (L) 05/03/2022 06:03 AM    MONOCYTES 7.90 05/03/2022 06:03 AM    EOSINOPHILS 1.40 05/03/2022 06:03 AM    BASOPHILS 0.20 05/03/2022 06:03 AM      Lab Results   Component Value Date/Time    PROTHROMBTM 12.6 09/30/2015 11:00 PM    INR 0.94 09/30/2015 11:00 PM        Leukocytosis  WBC's: 13.3 (5/3)  Has been afebrile  Will check CXR  Will check U/A  Monitor    CKD (chronic kidney disease)  Reported hx of CKD stage 3  Bun/Cr: 41/1.28  Not sure of baseline  Encouraging fluid intake  Monitor for now    Essential hypertension  BP low normal  On Lisinopril --> will d/c  Monitor    High potassium  K+: 5.2  ? 2nd to CKD  ? 2nd to Lisinopril --> will d/c  Monitor    Ischemic stroke (HCC)  S/P CVA  Had acute onset of left-sided weakness and slurred speech  MRI showed acute right frontal and parietal watershed strokes  Carotid U/S: negative for any significant stenosis  Echo: showed an EF of 35 to 40% as well as mild pericardial effusion  On ASA &  Plavix  On Lipitor    Systolic CHF, chronic (HCC)  O2 sats good on RA  Echo: showed an EF of 35 to 40% as well as mild pericardial effusion  BNP: 1022 (5/3) -- no other values for comparison  CXR: unremarkable, no edema or effusions    Vitamin D deficiency  Vit D: 15  Will start supplements      This case has been discussed with the attending Physiatrist.    Thank you for the consultation.  Will follow the patient with you.

## 2022-05-03 NOTE — CARE PLAN
The patient is Stable - Low risk of patient condition declining or worsening    Shift Goals  Clinical Goals: safety  Patient Goals: go home    Progress made toward(s) clinical / shift goals:      Problem: Fall Risk - Rehab  Goal: Patient will remain free from falls  Outcome: Progressing  Note: Patient is a LOW FALL RISK     Problem: Bladder / Voiding  Goal: Patient will establish and maintain regular urinary output  Outcome: Progressing  Note: Patient has been continent of bladder and had 2 PVR bladder scans under 100ml.     Problem: Bowel Elimination  Goal: Patient will participate in bowel management program  Outcome: Progressing  Note: Pt is continent of bowel and had a BM this shift     Problem: Infection  Goal: Patient will remain free from infection  Outcome: Progressing  Note: No s/s of infection present

## 2022-05-03 NOTE — FLOWSHEET NOTE
05/02/22 1700   Patient History   Pulmonary Diagnosis SHAHRZAD   Procedures Relevant to Respiratory Status none   Home O2 No   Nocturnal CPAP Yes  (she rarely uses it, doesn't like it)   Sleep Apnea Screening   Have you had a sleep study? Yes   Have you been diagnosed with sleep apnea? Yes

## 2022-05-03 NOTE — FLOWSHEET NOTE
05/02/22 1701   Events/Summary/Plan   Events/Summary/Plan RT assessment.  Pt declined oxygen in lieu of a CPAP machine which she no longer uses   Vital Signs   Pulse 71   Respiration 18   Pulse Oximetry 96 %   $ Pulse Oximetry (Spot Check) Yes   Respiratory Assessment   Level of Consciousness Alert   Chest Exam   Work Of Breathing / Effort Within Normal Limits   Oxygen   O2 Delivery Device None - Room Air   Smoking History   Have you ever smoked Yes   Have you smoked in the last 12 months Yes   Have you quit smoking   (stopped smoking in Oct 2021)

## 2022-05-03 NOTE — ASSESSMENT & PLAN NOTE
K+: 5.2 --> 4.8 (5/5) --> 5.0 (5/7)  Now on a low K+ diet  ? 2nd to CKD  ? 2nd to Lisinopril --> d/c'd (last dose 5/3)  Monitor

## 2022-05-03 NOTE — PROGRESS NOTES
4 Eyes Skin Assessment Completed by ABDI LEDEZMA and ABDI CORTES.    Head WDL  Ears WDL  Nose WDL  Mouth WDL  Neck WDL  Breast/Chest WDL  Shoulder Blades WDL  Spine WDL  (R) Arm/Elbow/Hand Bruising and Scab  (L) Arm/Elbow/Hand Bruising and Scar  Abdomen WDL  Groin WDL  Scrotum/Coccyx/Buttocks Redness, Blanching, Non-Blanching and Discoloration  (R) Leg WDL  (L) Leg WDL  (R) Heel/Foot/Toe WDL  (L) Heel/Foot/Toe WDL    Devices In Places NONE    Interventions In Place Waffle Overlay, Pillows and Pressure Redistribution Mattress    Possible Skin Injury Yes    Pictures Uploaded Into Epic Yes  Wound Consult Placed Yes  RN Wound Prevention Protocol Ordered Yes

## 2022-05-03 NOTE — ASSESSMENT & PLAN NOTE
Reported h/o CKD Stage III  Avoid nephrotoxins  Renal dose all meds  Monitor electrolytes  Outpt Nephrology F/U

## 2022-05-03 NOTE — ASSESSMENT & PLAN NOTE
Had acute onset of dysarthria and L HP  On ASA, Plavix, and Lipitor  Ongoing management per Physiatry

## 2022-05-03 NOTE — ASSESSMENT & PLAN NOTE
Echo 2/28/17 unable to view results in Epic  Echo 10/1/15 EF 45%, grade II DD, RVSP 55 mmHg  Consider F/U Echo  Cardiology F/U

## 2022-05-03 NOTE — DISCHARGE PLANNING
CASE MANAGEMENT INITIAL ASSESSMENT    Admit Date:  5/2/2022     I spoke with patients  Hua to discuss role of case management / discharge planning / team conference.     Patient is a  72 y.o. female transferred from outside hospital.    Diagnosis: Ischemic stroke (Formerly [facility]) [I63.9]    Co-morbidities:   Patient Active Problem List    Diagnosis Date Noted   • Ischemic stroke (Formerly [facility]) 05/02/2022   • Impaired mobility and ADLs 05/02/2022   • Presence of cardiac pacemaker 11/04/2021   • Acute on chronic systolic CHF (congestive heart failure) ([facility]) 10/29/2021   • Atrial fibrillation ([facility]) 10/24/2021   • Panlobular emphysema ([facility]) 10/07/2021   • Obstructive sleep apnea syndrome 04/20/2021   • Nocturnal hypoxia 04/02/2021   • Dyspnea on exertion 03/04/2021   • Other hyperlipidemia 03/04/2021   • Occlusion and stenosis of bilateral carotid arteries 02/18/2020   • Peripheral arterial disease ([facility]) 07/25/2019   • Sick sinus syndrome ([facility]) 05/17/2017   • S/P coronary artery stent placement 02/15/2017   • Essential hypertension 02/15/2017   • Dyslipidemia 02/15/2017   • Systolic dysfunction 02/15/2017   • Arteriosclerotic heart disease (ASHD) 02/15/2017   • ST elevation myocardial infarction (STEMI) of anterior wall, subsequent episode of care ([facility]) 10/07/2015   • Ischemic cardiomyopathy 10/07/2015   • Myocardial infarction ([facility]) 10/01/2015   • Rectal cancer ([facility]) 10/01/2015   • Tobacco abuse 10/01/2015     Prior Living Situation:  Housing / Facility: [address]  Lives with - Patient's Self Care Capacity: Spouse    Prior Level of Function:  Medication Management: Independent  Finances: Independent  Home Management: Independent  Shopping: Independent  Prior Level Of Mobility: Independent Without Device in Community  Driving / Transportation: Driving Independent    Support Systems:  Primary : Spouse  Advance Directives: Yes  Power of  (Name & Phone):  Hua: [phone]    Previous Services  Utilized:   Equipment Owned: None  Prior Services: None    Other Information:  Occupation (Pre-Hospital Vocational): Retired Due To Age     Primary Payor Source: Medicare A,Medicare B  Secondary Payor Source:  ([facility])  Primary Care Practitioner : David Albert MD    Patient / Family Goal:  Patient / Family Goal: 1. Gain mobility  2. Have all recommended DME at DC  3. Continue with therapy after DC home.    Plan:  1. Continue to follow patient through hospitalization and provide discharge planning in collaboration with patient, family, physicians and ancillary services.     2. Utilize community resources to ensure a safe discharge.

## 2022-05-03 NOTE — THERAPY
Speech Language Pathology   Initial Assessment     Patient Name: Ashlee Sepulveda  AGE:  72 y.o., SEX:  female  Medical Record #: [id number]  Today's Date: 5/3/2022     Subjective    Patient was seen for initial cognitive-linguistic evaluation, seated in room in w/c. She was alert and cooperative. SLP reviewed assessment results with patient. Patient was agreeable to goals to target memory, problem solving and medication management.      Objective       05/03/22 0931   Precautions   Precautions Fall Risk   Comments mild cog. imp.   Prior Level Of Function   Communication Within Functional Limits   Dentition Intact   Hearing Within Functional Limits for Evaluation   Vision Within Functional Limits for Evaluation   Patient's Primary Language English   Occupation (Pre-Hospital Vocational) Retired Due To Age   Prior Functioning: Everyday Activities   Functional Cognition Independent   Receptive Language / Auditory Comprehension   Receptive Language / Auditory Comprehension WDL   Expressive Language   Expressive Language (WDL) WDL   Reading Comprehension    Functional Reading Materials Supervision (5)   Written Language Expression   Spontaneous Sentence Level Supervision (5)   Cognition   Simple Attention Within Functional Limits (6-7)   Complex Attention Minimal (4)   Orientation  Within Functional Limits (6-7)   Verbal Short Term Memory   (Moderate)   Visual Short Term Memory Moderate (3)   Functional Memory Activities Moderate (3)   Complex Reasoning  / Problem Solving Minimal (4)   Clock Drawing Within Functional Limits   Social / Pragmatic Communication   Social / Pragmatic Communication WDL   Tracheostomy   Tracheostomy No   Functional Level of Assist   Comprehension Independent   Expression Independent   Social Interaction Independent   Problem Solving Moderate Assist   Problem Solving Description Verbal cueing;Therapy schedule;Increased time   Memory Moderate Assist   Memory Description Verbal cueing;Therapy  "schedule;Increased time   Outcome Measures   Outcome Measures Utilized SCCAN   SCCAN (Scales of Cognitive and Communicative Ability for Neurorehabilitation)   Oral Expression - Raw Score 18   Oral Expression - Scale Performance Score 95   Orientation - Raw Score 12   Orientation - Scale Performance Score 100   Memory - Raw Score 9   Memory - Scale Performance Score 47   Speech Comprehension - Raw Score 12   Speech Comprehension - Scale Performance Score 92   Reading Comprehension - Raw Score 11   Reading Comprehension - Scale Performance Score 92   Writing - Raw Score 7   Writing - Scale Performance Score 100   Attention - Raw Score 13   Attention - Scale Performance Score 81   Problem Solving - Raw Score 19   Problem Solving - Scale Performance Score 83   SCCAN Total Raw Score 79   SCCAN Degree of Severity Mild Impairment   Problem List   Problem List Attention Deficit;Memory Deficit   Current Discharge Plan   Current Discharge Plan Return to Prior Living Situation   Interdisciplinary Plan of Care Collaboration   Patient Position at End of Therapy Seated;Call Light within Reach;Tray Table within Reach;Phone within Reach   SLP Total Time Spent   SLP Individual Total Time Spent (Mins) 60   Evaluation Charges   SLP Speech Language Evaluation Speech Sound Language Comprehension       Assessment    Patient is 72 y.o. female with a diagnosis of ischemic stroke.  Additional factors influencing patient status/progress (ie: cognitive factors, co-morbidities, social support, etc): Independent PLOF, Mild Cognitive Impairment.  H&P: \"The patient is a 72 y.o. female with a past medical history of coronary artery disease status post stent x3 on aspirin, chronic systolic CHF, COPD, hypertension, hyperlipidemia, severe peripheral artery disease on Eliquis, chronic kidney disease stage III, sleep apnea; now admitted for acute inpatient rehabilitation with severe functional debility after an acute stroke. On admission the patient " "and medical record report patient was admitted to [facility] on 4/29 from [place] with acute onset of left-sided weakness and slurred speech.  She was on aspirin and Eliquis.  Imaging showed negative head CT, MRI with acute right frontal and parietal watershed strokes.  She had carotid ultrasounds which were negative for any significant stenosis.  She had an echocardiogram which showed ejection fraction of 35 to 40% as well as mild pericardial effusion.  Patient was also noted to have a left hip hematoma from her fall.  Labs showed anemia with a hemoglobin of 11.5.  She also had acute on chronic kidney injury with elevated BUN, creatinine, and potassium.  Recommendations was to add Plavix to her Eliquis and aspirin regimen.  She was given IV fluids for her acute kidney injury.\"      SLP administered the Scales of Cognitive and Communicative Abilities for Neurorehabilitation (SCCAN). Patient scored 79, indicating a mild cognitive impairment. Patient demonstrates intact orientation, receptive/expressive language and reading/writing. However, she demonstrates moderate deficits in memory and mild deficits in attention, also impacting overall problem solving skills.       Plan    Patient would benefit from ST to target functional IADLs and memory.     Recommend Speech Therapy  minutes per day 5-6 days per week for 3 weeks for the following treatments:  SLP Speech Language Treatment, SLP Self Care / ADL Training , SLP Cognitive Skill Development, and SLP Group Treatment.    Passport items to be completed:  [unfilled]    Goals:  Long term and short term goals have been discussed with patient and they are in agreement.    Speech Therapy Problems (Active)       Problem: Memory STGs       Dates: Start: 05/03/22         Goal: STG-Within one week, patient will use external aids and internal memory strategies to recall new information with 90% accuracy and minimal cues       Dates: Start: 05/03/22          "      Problem: Problem Solving STGs       Dates: Start: 05/03/22         Goal: STG-Within one week, patient will complete medication management tasks with 90% accuracy and minimal cues       Dates: Start: 05/03/22            Goal: STG-Within one week, patient will complete functional problem solving tasks related to IADLs with 90% accuracy and minimal cues       Dates: Start: 05/03/22               Problem: Speech/Swallowing LTGs       Dates: Start: 05/03/22         Goal: LTG-By discharge, patient will improve cognitive skills from the mild impairment range to the typical functioning range, as indicated by standardized assessment and functional skill level       Dates: Start: 05/03/22

## 2022-05-03 NOTE — PROGRESS NOTES
Patient admitted to facility at [facility] &  W/C Patient assisted to room and positioned in bed for comfort and safety; call light within reach.  Patient assisted with stowing belongings and oriented to room and facility.  Admission assessment performed and documented in computer.  Admission paperwork completed; signed copies placed in chart.  Will continue to monitor.

## 2022-05-03 NOTE — THERAPY
"Occupational Therapy   Initial Evaluation     Patient Name: Ashlee Sepulveda  Age:  72 y.o., Sex:  female  Medical Record #: [id number]  Today's Date: 5/3/2022     Subjective    Pt in bed after finishing breakfast, agreeable to OT evaluation.      Objective     05/03/22 0831   Prior Living Situation   Prior Services None   Housing / Facility 1 Story House   Steps Into Home 4   Steps In Home 0   Rail Left Rail  (Steps into Home)   Elevator No   Bathroom Set up Walk In Shower;Bathtub / Shower Combination  (Primarily uses WIS, has built-in seat)   Equipment Owned None   Lives with - Patient's Self Care Capacity Spouse   Prior Level of ADL Function   Self Feeding Independent   Grooming / Hygiene Independent   Bathing Independent   Dressing Independent   Toileting Independent   Prior Level of IADL Function   Medication Management Independent   Laundry Independent   Kitchen Mobility Independent   Finances Independent   Home Management Independent   Shopping Independent   Prior Level Of Mobility Independent Without Device in Community   Driving / Transportation Driving Independent   Occupation (Pre-Hospital Vocational) Retired Due To Age   Leisure Interests Gardening   Prior Functioning: Everyday Activities   Self Care Independent   Indoor Mobility (Ambulation) Independent   Stairs Independent   Functional Cognition Independent   Prior Device Use None of the given options   Pain   Intervention Declines   Cognition    Orientation Level Oriented x 4   Level of Consciousness Alert   ABS (Agitated Behavior Scale)   Agitated Behavior Scale Performed No   Cognitive Pattern Assessment   Cognitive Pattern Assessment Used BIMS   Brief Interview for Mental Status (BIMS)   Repetition of Three Words (First Attempt) 3   Temporal Orientation: Year Correct   Temporal Orientation: Month Accurate within 5 days   Temporal Orientation: Day Correct   Recall: \"Sock\" No, could not recall   Recall: \"Blue\" Yes, no cue required   Recall: \"Bed\" Yes, no " cue required   BIMS Summary Score 13   Vision Screen   Vision Tested   Visual Acuity Able to read employee name alvaro without difficulty   Passive ROM Upper Body   Passive ROM Upper Body Not Tested   Active ROM Upper Body   Active ROM Upper Body  X   Lt Shoulder Flexion Degrees 90   Lt. Shoulder Abduction Degrees 90   Comments RUE WFL, LUE grossly impaired due to weakness   Strength Upper Body   Upper Body Strength  X   Lt Shoulder Flexion Strength 3 (F)   Lt Elbow Flexion Strength 3 (F)   Sensation Upper Body   Upper Extremity Sensation  WDL   Comments Light touch sensation intact bilaterally   Upper Body Muscle Tone   Upper Body Muscle Tone  WDL   Balance Assessment   Sitting Balance (Static) Fair   Sitting Balance (Dynamic) Fair -   Standing Balance (Static) Fair -   Standing Balance (Dynamic) Poor +   Weight Shift Sitting Fair   Weight Shift Standing Fair   Bed Mobility    Supine to Sit Standby Assist  (HOBE)   Sit to Supine   (NT - pt up in chair at end of session)   Sit to Stand Minimal Assist   Scooting Contact Guard Assist   Coordination Upper Body   Coordination X   Fine Motor Coordination LUE serial opposition impaired and grossly impaired during ADLs   Gross Motor Coordination LUE impaired during ADLs, requires further assessment   Eating   Assistance Needed Set-up / clean-up   Physical Assistance Level No physical assistance   CARE Score - Eating 5   Eating Discharge Goal   Discharge Goal 6   Oral Hygiene   Assistance Needed Supervision   Physical Assistance Level No physical assistance   CARE Score - Oral Hygiene 4   Oral Hygiene Discharge Goal   Discharge Goal 6   Shower/Bathe Self   Assistance Needed Physical assistance   Physical Assistance Level 26%-50%   CARE Score - Shower/Bathe Self 3   Shower/Bathe Self Discharge Goal   Discharge Goal 4   Upper Body Dressing   Assistance Needed Supervision   Physical Assistance Level No physical assistance   CARE Score - Upper Body Dressing 4   Upper Body  Dressing Discharge Goal   Discharge Goal 6   Lower Body Dressing   Assistance Needed Physical assistance   Physical Assistance Level 76% or more   CARE Score - Lower Body Dressing 2   Lower Body Dressing Discharge Goal   Discharge Goal 5   Putting On/Taking Off Footwear   Assistance Needed Physical assistance   Physical Assistance Level 26%-50%   CARE Score - Putting On/Taking Off Footwear 3   Putting On/Taking Off Footwear Discharge Goal   Discharge Goal 6   Toileting Hygiene   Assistance Needed Physical assistance   Physical Assistance Level 51%-75%   CARE Score - Toileting Hygiene 2   Toileting Hygiene Discharge Goal   Discharge Goal 6   Toilet Transfer   Assistance Needed Physical assistance   Physical Assistance Level 26%-50%   CARE Score - Toilet Transfer 3   Toilet Transfer Discharge Goal   Discharge Goal 6   Hearing, Speech, and Vision   Expression of Ideas and Wants Without difficulty   Understanding Verbal and Non-Verbal Content Understands   Functional Level of Assist   Eating Supervision   Eating Description Set-up of equipment or meal/tube feeding   Grooming Supervision;Seated   Grooming Description Increased time;Initial preparation for task;Seated in wheelchair at sink   Bathing Moderate Assist  (assist for perineal and to wash/dry distal LEs)   Bathing Description Grab bar;Hand held shower;Tub bench;Assit with back;Assit wtih lower extremities;Assit with perineal;Increased time;Initial preparation for task;Set-up of equipment;Supervision for safety;Verbal cueing   Upper Body Dressing Supervision   Upper Body Dressing Description Increased time;Supervision for safety;Initial preparation for task   Lower Body Dressing Maximal Assist  (Assist to thread LLE through and pull pants up)   Lower Body Dressing Description Grab bar;Assist with threading into pant leg;Increased time;Initial preparation for task;Set-up of equipment;Supervision for safety;Verbal cueing   Toileting Maximal Assist  (assist for all  clothing mgmt, able to manage hygiene after urinating)   Toileting Description Assist to pull pants up;Assist to pull pants down;Assist for standing balance;Grab bar;Initial preparation for task;Increased time;Set-up of equipment;Verbal cueing;Supervision for safety   Bed, Chair, Wheelchair Transfer Moderate Assist  (SPT EOB to w/c w/o AD)   Bed Chair Wheelchair Transfer Description Increased time;Initial preparation for task;Requires lift;Set-up of equipment;Supervision for safety;Verbal cueing   Toilet Transfers Moderate Assist  (SPT w/c <> toilet)   Toilet Transfer Description Increased time;Initial preparation for task;Requires lift;Set-up of equipment;Supervision for safety;Verbal cueing   Tub / Shower Transfers Moderate Assist  (SPT w/c <> tub bench)   Tub Shower Transfer Description Shower bench;Grab bar;Increased time;Initial preparation for task;Set-up of equipment;Supervision for safety;Verbal cueing   Problem List   Problem List Decreased Active Daily Living Skills;Decreased Homemaking Skills;Decreased Upper Extremity Strength Left;Decreased Upper Extremity AROM Left;Decreased Functional Mobility;Decreased Activity Tolerance;Impaired Coordination Left Upper Extremity;Impaired Postural Control / Balance   Precautions   Precautions Fall Risk   Current Discharge Plan   Current Discharge Plan Return to Prior Living Situation   Benefit    Therapy Benefit Patient Would Benefit from Inpatient Rehab Occupational Therapy to Maximize [place] with ADLs, IADLs and Functional Mobility.   Interdisciplinary Plan of Care Collaboration   IDT Collaboration with  Nursing   Patient Position at End of Therapy Seated;Chair Alarm On;Self Releasing Lap Belt Applied;Call Light within Reach;Tray Table within Reach;Phone within Reach   Collaboration Comments Notified RN of wound on L elbow, in to apply dressing   Equipment Needs   Assistive Device / DME Front-Wheel Walker;Shower Chair;Grab Bars In Shower / Tub;Grab Bars By  Toilet   Adaptive Equipment Reacher;Sock Aide;Dressing Stick;Long Handled Shoe Horn;Long Handled Sponge   Strengths & Barriers   Strengths Able to follow instructions;Effective communication skills;Good insight into deficits/needs;Independent prior level of function;Manages pain appropriately;Motivated for self care and independence;Pleasant and cooperative;Supportive family;Willingly participates in therapeutic activities   Barriers Decreased endurance;Generalized weakness;Hemiparesis;Home accessibility;Impaired activity tolerance;Impaired balance;Limited mobility  (fragile skin)   OT Total Time Spent   OT Individual Total Time Spent (Mins) 60   OT Charge Group   OT Self Care / ADL 1   OT Evaluation OT Evaluation Mod     Assessment  Patient is a 72 y.o. female with a diagnosis of ischemic stroke with left hemiparesis. Additional factors influencing patient status / progress (ie: cognitive factors, co-morbidities, social support, etc): PMHx includes coronary artery disease status post stent x3 on aspirin, chronic systolic CHF, COPD, hypertension, hyperlipidemia, severe peripheral artery disease on Eliquis, chronic kidney disease stage III, sleep apnea. Pt lives in [place] with her  in a [facility] with 4 JAKE with L rail, a WIS and tub combo with a built-in shower seat in the WIS, no grab bars. Pt owns no AE and reports IPLOF including driving and community mobility w/o AD.     During OT eval, pt presented with impaired balance with an intermittent mild L lateral lean when standing, L hemiparesis and inattention to the LUE primarily at rest, decreased activity tolerance and limited mobility impairing ADL function. Pt was able to use her LUE during functional tasks but demo's impaired coordination and strength. Required verbal cues to tend to the LUE when seated and prevent it from hanging at her side. Required modA for transfers via SPT and bathing tasks, maxA for toileting and LBD. Pt is highly motivated and will  benefit from inpatient occupational therapy services to maximize safety and independence in ADLs, transfers and related functional mobility prior to d/c home with support of her family.     Plan  Recommend Occupational Therapy  minutes per day 5-6 days per week for 2-3 weeks for the following treatments:  OT E Stim Attended, OT Group Therapy, OT Self Care/ADL, OT Community Reintegration, OT Manual Ther Technique, OT Neuro Re-Ed/Balance, OT Sensory Int Techniques, OT Therapeutic Activity and OT Therapeutic Exercise.    Passport items to be completed:  Perform bathroom transfers, complete dressing, complete feeding, get ready for the day, prepare a simple meal, participate in household tasks, adapt home for safety needs, demonstrate home exercise program, complete caregiver training     Goals:  Long term and short term goals have been discussed with patient and they are in agreement.    Occupational Therapy Goals (Active)       Problem: Bathing       Dates: Start: 05/03/22         Goal: STG-Within one week, patient will bathe with Lan and use of AE as needed.       Dates: Start: 05/03/22               Problem: Dressing       Dates: Start: 05/03/22         Goal: STG-Within one week, patient will dress LB with Lan and use of AE as needed.       Dates: Start: 05/03/22               Problem: Functional Transfers       Dates: Start: 05/03/22         Goal: STG-Within one week, patient will transfer to toilet with CGA and use of AE as needed.       Dates: Start: 05/03/22               Problem: OT Long Term Goals       Dates: Start: 05/03/22         Goal: LTG-By discharge, patient will complete basic self care tasks with supervision to modified independence and use of AE as needed.       Dates: Start: 05/03/22            Goal: LTG-By discharge, patient will perform bathroom transfers with supervision to modified independence and use of AE as needed.       Dates: Start: 05/03/22               Problem: Toileting        Dates: Start: 05/03/22         Goal: STG-Within one week, patient will complete toileting tasks with Lan and use of AE as needed.       Dates: Start: 05/03/22

## 2022-05-03 NOTE — PROGRESS NOTES
"Rehab Progress Note     Date of Service: 5/3/2022  Chief Complaint: Follow-up stroke    Interval Events (Subjective)    Patient seen and examined today in her room.  She reports she slept well last night.  She is already had occupational therapy and speech therapy this morning.  She is happy to get a shower.  She denies any pain.  She is going to have her first physical therapy assessment this afternoon.  Her COVID admission screen test is still pending.  She has no complaints today.    ROS: No changes to bowel, bladder, pain, mood, or sleep.       Objective:  VITAL SIGNS: /64   Pulse 74   Temp 36.5 °C (97.7 °F) (Oral)   Resp 20   Ht 1.676 m (5' 6\")   Wt 59 kg (130 lb)   SpO2 94%   BMI 20.98 kg/m²   Gen: alert, no apparent distress  CV: Regular rate and rhythm  Resp: Clear to auscultation bilaterally  Neuro: Left hemiparesis    Recent Results (from the past 72 hour(s))   CoV-2 and Flu A/B by PCR (24 hour In-House): Collect NP swab in [place]    Collection Time: 05/02/22  4:23 PM    Specimen: Nasopharyngeal; Respirate   Result Value Ref Range    SARS-CoV-2 Source NP Swab    CBC with Differential    Collection Time: 05/03/22  6:03 AM   Result Value Ref Range    WBC 13.3 (H) 4.8 - 10.8 K/uL    RBC 3.86 (L) 4.20 - 5.40 M/uL    Hemoglobin 11.7 (L) 12.0 - 16.0 g/dL    Hematocrit 36.4 (L) 37.0 - 47.0 %    MCV 94.3 81.4 - 97.8 fL    MCH 30.3 27.0 - 33.0 pg    MCHC 32.1 (L) 33.6 - 35.0 g/dL    RDW 51.1 (H) 35.9 - 50.0 fL    Platelet Count 298 164 - 446 K/uL    MPV 10.5 9.0 - 12.9 fL    Neutrophils-Polys 72.80 (H) 44.00 - 72.00 %    Lymphocytes 16.80 (L) 22.00 - 41.00 %    Monocytes 7.90 0.00 - 13.40 %    Eosinophils 1.40 0.00 - 6.90 %    Basophils 0.20 0.00 - 1.80 %    Immature Granulocytes 0.90 0.00 - 0.90 %    Nucleated RBC 0.00 /100 WBC    Neutrophils (Absolute) 9.68 (H) 2.00 - 7.15 K/uL    Lymphs (Absolute) 2.24 1.00 - 4.80 K/uL    Monos (Absolute) 1.05 (H) 0.00 - 0.85 K/uL    Eos (Absolute) 0.19 0.00 - 0.51 " K/uL    Baso (Absolute) 0.03 0.00 - 0.12 K/uL    Immature Granulocytes (abs) 0.12 (H) 0.00 - 0.11 K/uL    NRBC (Absolute) 0.00 K/uL   Comp Metabolic Panel (CMP)    Collection Time: 05/03/22  6:03 AM   Result Value Ref Range    Sodium 136 135 - 145 mmol/L    Potassium 5.2 3.6 - 5.5 mmol/L    Chloride 104 96 - 112 mmol/L    Co2 19 (L) 20 - 33 mmol/L    Anion Gap 13.0 7.0 - 16.0    Glucose 96 65 - 99 mg/dL    Bun 41 (H) 8 - 22 mg/dL    Creatinine 1.28 0.50 - 1.40 mg/dL    Calcium 9.0 8.5 - 10.5 mg/dL    AST(SGOT) 6 (L) 12 - 45 U/L    ALT(SGPT) 14 2 - 50 U/L    Alkaline Phosphatase 48 30 - 99 U/L    Total Bilirubin 0.4 0.1 - 1.5 mg/dL    Albumin 3.7 3.2 - 4.9 g/dL    Total Protein 6.0 6.0 - 8.2 g/dL    Globulin 2.3 1.9 - 3.5 g/dL    A-G Ratio 1.6 g/dL   Magnesium    Collection Time: 05/03/22  6:03 AM   Result Value Ref Range    Magnesium 2.5 1.5 - 2.5 mg/dL   Vitamin D, 25-hydroxy (blood)    Collection Time: 05/03/22  6:03 AM   Result Value Ref Range    25-Hydroxy   Vitamin D 25 15 (L) 30 - 100 ng/mL   proBrain Natriuretic Peptide, NT    Collection Time: 05/03/22  6:03 AM   Result Value Ref Range    NT-proBNP 1022 (H) 0 - 125 pg/mL   HEMOGLOBIN A1C    Collection Time: 05/03/22  6:03 AM   Result Value Ref Range    Glycohemoglobin 5.7 (H) 4.0 - 5.6 %    Est Avg Glucose 117 mg/dL   Lipid Profile    Collection Time: 05/03/22  6:03 AM   Result Value Ref Range    Cholesterol,Tot 142 100 - 199 mg/dL    Triglycerides 92 0 - 149 mg/dL    HDL 52 >=40 mg/dL    LDL 72 <100 mg/dL   IRON/TOTAL IRON BIND    Collection Time: 05/03/22  6:03 AM   Result Value Ref Range    Iron 49 40 - 170 ug/dL    Total Iron Binding 277 250 - 450 ug/dL    Unsat Iron Binding 228 110 - 370 ug/dL    % Saturation 18 15 - 55 %   ESTIMATED GFR    Collection Time: 05/03/22  6:03 AM   Result Value Ref Range    GFR (CKD-EPI) 44 (A) >60 mL/min/1.73 m 2       Current Facility-Administered Medications   Medication Frequency   • vitamin D3 (cholecalciferol) tablet  1,000 Units DAILY   • hydrOXYzine HCl (ATARAX) tablet 50 mg Q6HRS PRN   • melatonin tablet 3 mg HS PRN   • Respiratory Therapy Consult Continuous RT   • Pharmacy Consult Request ...Pain Management Review 1 Each PHARMACY TO DOSE   • hydrALAZINE (APRESOLINE) tablet 10 mg Q8HRS PRN   • acetaminophen (Tylenol) tablet 650 mg Q4HRS PRN   • senna-docusate (PERICOLACE or SENOKOT S) 8.6-50 MG per tablet 2 Tablet BID    And   • polyethylene glycol/lytes (MIRALAX) PACKET 1 Packet QDAY PRN    And   • magnesium hydroxide (MILK OF MAGNESIA) suspension 30 mL QDAY PRN    And   • bisacodyl (DULCOLAX) suppository 10 mg QDAY PRN   • lactulose 20 GM/30ML solution 30 mL QDAY PRN   • omeprazole (PRILOSEC) capsule 20 mg QAM AC   • artificial tears ophthalmic solution 1 Drop PRN   • benzocaine-menthol (Cepacol) lozenge 1 Lozenge Q2HRS PRN   • mag hydrox-al hydrox-simeth (MAALOX PLUS ES or MYLANTA DS) suspension 20 mL Q2HRS PRN   • ondansetron (ZOFRAN ODT) dispertab 4 mg 4X/DAY PRN    Or   • ondansetron (ZOFRAN) syringe/vial injection 4 mg 4X/DAY PRN   • traZODone (DESYREL) tablet 50 mg QHS PRN   • sodium chloride (OCEAN) 0.65 % nasal spray 2 Spray PRN   • aspirin EC (ECOTRIN) tablet 81 mg DAILY   • clopidogrel (PLAVIX) tablet 75 mg DAILY   • midazolam (VERSED) 5 mg/mL (1 mL vial) PRN   • atorvastatin (LIPITOR) tablet 40 mg Q EVENING       Orders Placed This Encounter   Procedures   • Diet Order Diet: Cardiac     Standing Status:   Standing     Number of Occurrences:   1     Order Specific Question:   Diet:     Answer:   Cardiac [6]       Assessment:    This patient is a 72 y.o. female admitted for acute inpatient rehabilitation with Ischemic stroke (HCC).    Problem List/Medical Decision Making and Plan:      Right frontal parietal ischemic stroke  Left hemiparesis  Nondominant  Left neglect  Cognitive impairment  Continue full rehab program  PT/OT/SLP, 1 hr each discipline, 5 days per week    Aspirin  Plavix  Eliquis    Hypertension  Home  medications included carvedilol 6.25 mg twice daily, lisinopril 20 mg daily, Lasix 20 mg daily, spironolactone 25 mg daily  Appreciate hospitalist assistance  Restarted on low-dose of lisinopril     Chronic systolic CHF  Ejection fraction of 35 to 40%  Elevated BNP  Low-dose lisinopril  Previously on Lasix 20 mg daily and spironolactone 25 mg daily  Appreciate hospitalist assistance     Coronary artery disease  History of 3 coronary stents  Aspirin  Plavix  Statin  Eliquis    GI prophylaxis  Omeprazole     Chronic kidney disease, stage III  Avoid nephrotoxins  Renally dose medications      COPD  Respiratory therapist evaluation    Leukocytosis  Afebrile  Will check urinalysis and chest x-ray  Appreciate hospitalist assistance     Peripheral vascular disease  History of stents in the bilateral iliac arteries  Aspirin  Plavix  Statin  Eliquis     Sleep apnea  Did not tolerate CPAP     Anemia    Vitamin D deficiency, 15  Start supplementation    Bowel program  Continue bowel medications  Last BM 5/2    Bladder program  Check PVRs - 1  Bladder scan for no voids  ICP for over 400 cc  Scheduled tolieting    DVT prophylaxis  Eliquis      Total time:  35 minutes.  I spent greater than 50% of the time for patient care, counseling, and coordination on this date, including patient face-to face time, unit/floor time with review of records/pertinent lab data and studies, as well as discussing diagnostic evaluation/work up, planned therapeutic interventions, and future disposition of care, as per the interval events/subjective and the assessment and plan as noted above.      Abida Boyce M.D.   Physical Medicine and Rehabilitation

## 2022-05-04 DIAGNOSIS — I63.9 ISCHEMIC STROKE (HCC): ICD-10-CM

## 2022-05-04 PROCEDURE — 97530 THERAPEUTIC ACTIVITIES: CPT | Mod: CO

## 2022-05-04 PROCEDURE — 770010 HCHG ROOM/CARE - REHAB SEMI PRIVAT*

## 2022-05-04 PROCEDURE — 97130 THER IVNTJ EA ADDL 15 MIN: CPT | Performed by: SPEECH-LANGUAGE PATHOLOGIST

## 2022-05-04 PROCEDURE — 97530 THERAPEUTIC ACTIVITIES: CPT | Mod: CQ

## 2022-05-04 PROCEDURE — 97110 THERAPEUTIC EXERCISES: CPT | Mod: CO

## 2022-05-04 PROCEDURE — 97129 THER IVNTJ 1ST 15 MIN: CPT | Performed by: SPEECH-LANGUAGE PATHOLOGIST

## 2022-05-04 PROCEDURE — 97116 GAIT TRAINING THERAPY: CPT | Mod: CQ

## 2022-05-04 PROCEDURE — 97110 THERAPEUTIC EXERCISES: CPT | Mod: CQ

## 2022-05-04 PROCEDURE — 99233 SBSQ HOSP IP/OBS HIGH 50: CPT | Performed by: PHYSICAL MEDICINE & REHABILITATION

## 2022-05-04 PROCEDURE — 97112 NEUROMUSCULAR REEDUCATION: CPT | Mod: CO

## 2022-05-04 PROCEDURE — 97112 NEUROMUSCULAR REEDUCATION: CPT | Mod: CQ

## 2022-05-04 PROCEDURE — A9270 NON-COVERED ITEM OR SERVICE: HCPCS | Performed by: HOSPITALIST

## 2022-05-04 PROCEDURE — 700102 HCHG RX REV CODE 250 W/ 637 OVERRIDE(OP): Performed by: HOSPITALIST

## 2022-05-04 PROCEDURE — 99232 SBSQ HOSP IP/OBS MODERATE 35: CPT | Performed by: HOSPITALIST

## 2022-05-04 PROCEDURE — 700102 HCHG RX REV CODE 250 W/ 637 OVERRIDE(OP): Performed by: PHYSICAL MEDICINE & REHABILITATION

## 2022-05-04 PROCEDURE — A9270 NON-COVERED ITEM OR SERVICE: HCPCS | Performed by: PHYSICAL MEDICINE & REHABILITATION

## 2022-05-04 RX ADMIN — ATORVASTATIN CALCIUM 40 MG: 40 TABLET, FILM COATED ORAL at 19:52

## 2022-05-04 RX ADMIN — Medication 1000 UNITS: at 08:24

## 2022-05-04 RX ADMIN — ASPIRIN 81 MG: 81 TABLET, COATED ORAL at 08:24

## 2022-05-04 RX ADMIN — CLOPIDOGREL BISULFATE 75 MG: 75 TABLET ORAL at 08:24

## 2022-05-04 RX ADMIN — OMEPRAZOLE 20 MG: 20 CAPSULE, DELAYED RELEASE ORAL at 08:24

## 2022-05-04 ASSESSMENT — GAIT ASSESSMENTS
GAIT LEVEL OF ASSIST: CONTACT GUARD ASSIST
ASSISTIVE DEVICE: FRONT WHEEL WALKER
DEVIATION: DECREASED BASE OF SUPPORT;DECREASED HEEL STRIKE;DECREASED TOE OFF
DISTANCE (FEET): 80
GAIT LEVEL OF ASSIST: MINIMAL ASSIST
DEVIATION: ATAXIC;DECREASED BASE OF SUPPORT;BRADYKINETIC;DECREASED HEEL STRIKE;OTHER (COMMENT)
ASSISTIVE DEVICE: FRONT WHEEL WALKER

## 2022-05-04 ASSESSMENT — ENCOUNTER SYMPTOMS
NAUSEA: 0
BLURRED VISION: 0
HALLUCINATIONS: 0
DIZZINESS: 0
VOMITING: 0
HEADACHES: 0
FEVER: 0
SHORTNESS OF BREATH: 0
PALPITATIONS: 0

## 2022-05-04 ASSESSMENT — ACTIVITIES OF DAILY LIVING (ADL)
BED_CHAIR_WHEELCHAIR_TRANSFER_DESCRIPTION: ADAPTIVE EQUIPMENT;INCREASED TIME;SET-UP OF EQUIPMENT;SUPERVISION FOR SAFETY;VERBAL CUEING

## 2022-05-04 ASSESSMENT — PAIN DESCRIPTION - PAIN TYPE: TYPE: ACUTE PAIN

## 2022-05-04 NOTE — CARE PLAN
Problem: Bathing  Goal: STG-Within one week, patient will bathe with Lan and use of AE as needed.  Outcome: Not Met  Note: Evaluated day prior to this documentation   Limited by decreased balance, decreased left UE function due to  hemiparesis/Decreased attention/ coordination   Continue goal      Problem: Dressing  Goal: STG-Within one week, patient will dress LB with Lan and use of AE as needed.  Outcome: Not Met  Note: Evaluated day prior to this documentation   Limited by decreased balance, decreased left UE function due to  hemiparesis/Decreased attention/ coordination   Continue goal      Problem: Toileting  Goal: STG-Within one week, patient will complete toileting tasks with Lan and use of AE as needed.  Outcome: Not Met  Note: Evaluated day prior to this documentation   Limited by decreased balance, decreased left UE function due to  hemiparesis/Decreased attention/ coordination   Continue goal      Problem: Functional Transfers  Goal: STG-Within one week, patient will transfer to toilet with CGA and use of AE as needed.  Outcome: Not Met  Note: Evaluated day prior to this documentation   Limited by decreased balance, decreased left UE function due to  hemiparesis/Decreased attention/ coordination   Continue goal

## 2022-05-04 NOTE — DISCHARGE PLANNING
CM met with patient and her  to update on IDT and DC date of 5/12/22.  Answered questions.  CM available as needs arise.

## 2022-05-04 NOTE — THERAPY
Physical Therapy   Daily Treatment     Patient Name: Ashlee Sepulveda  Age:  72 y.o., Sex:  female  Medical Record #: [id number]  Today's Date: 5/4/2022     Precautions  Precautions: Fall Risk  Comments: L hemiplegia, mild cog impairment    Subjective    Pt greeted in the room seated in , ready for PT     Objective       05/04/22 1031   Precautions   Precautions Fall Risk   Comments L hemiplegia, mild cog impairment   Pain   Intervention Declines   Gait Functional Level of Assist    Gait Level Of Assist Contact Guard Assist   Assistive Device Front Wheel Walker   Distance (Feet) 80   # of Times Distance was Traveled 2   Deviation Decreased Base Of Support;Decreased Heel Strike;Decreased Toe Off   Wheelchair Functional Level of Assist   Wheelchair Assist Stand by Assist   Distance Wheelchair (Feet or Distance) 50   Wheelchair Description Extra time;Leg rest management;Limited by fatigue;Supervision for safety   Sitting Lower Body Exercises   Sitting Lower Body Exercises Yes   Ankle Pumps 1 set of 10   Hip Abduction 1 set of 10   Hip Adduction 1 set of 10;2 sets of 10   Long Arc Quad 1 set of 10   Marching 1 set of 10   Hamstring Curl 1 set of 10   Sit to Stand   (1 x 5)   Comments pink band, provided seated HEP HO   Bed Mobility    Sit to Stand Contact Guard Assist   Neuro-Muscular Treatments   Neuro-Muscular Treatments Anterior weight shift;Sequencing;Verbal Cuing;Postural Facilitation   Comments STS transfer training from wc <> FWW 1 x 5 reps emphasis on hand placement, scooting forward, and foot placement   Interdisciplinary Plan of Care Collaboration   IDT Collaboration with  Nursing   Patient Position at End of Therapy Seated;Self Releasing Lap Belt Applied;Tray Table within Reach;Call Light within Reach   Collaboration Comments RN applied bandage to pt's L elbow   PT Total Time Spent   PT Individual Total Time Spent (Mins) 30   PT Charge Group   PT Gait Training 1   PT Therapeutic Exercise 1   Supervising  Physical Therapist Sigrid Sanders     Practiced uneven terrain amb (yoga mat with bean bag underneath)  x 10 feet with Lan for steadying, pt also picked up object with incidental touching  Provided seated HEP HO to patient    Assessment    The patient tolerated the session well with focus on LE strength and progression of mobility. Pt with L knee genu-recurvatum and with cues was more controlled during 2nd bout of amb. Good carryover with STS training. Will benefit from additional training on HEP for in room  Strengths: Able to follow instructions,Adequate strength,Good insight into deficits/needs,Independent prior level of function,Good carryover of learning,Motivated for self care and independence,Pleasant and cooperative,Supportive family,Willingly participates in therapeutic activities  Barriers: Decreased endurance,Hemiparesis,Impaired balance    Plan    Ambulation w/ FWW, STS from varying surface and work on hand placement, standing balance, general strengthening and endurance, bed mobility.     Passport items to be completed:  Get in/out of bed safely, in/out of a vehicle, safely use mobility device, walk or wheel around home/community, navigate up and down stairs, show how to get up/down from the ground, ensure home is accessible, demonstrate HEP, complete caregiver training     Physical Therapy Problems (Active)       Problem: Balance       Dates: Start: 05/03/22         Goal: STG-Within one week, patient will maintain dynamic standing perform Avila       Dates: Start: 05/03/22         Goal Note filed on 05/04/22 0824 by Karol Carlson PTA       PT eval performed yesterday                 Problem: Mobility       Dates: Start: 05/03/22         Goal: STG-Within one week, patient will ambulate household distance with FWW x150 CGA with improved posture/L knee stability       Dates: Start: 05/03/22         Goal Note filed on 05/04/22 0824 by Karol Carlson PTA       PT eval performed yesterday              Goal:  "STG-Within one week, patient will ascend and descend four to six stairs 1 HR, 6\" steps with Lan       Dates: Start: 05/03/22         Goal Note filed on 05/04/22 0824 by Karol Carlson PTA       PT kristine performed yesterday                 Problem: Mobility Transfers       Dates: Start: 05/03/22         Goal: STG-Within one week, patient will transfer bed to chair CGA SPT       Dates: Start: 05/03/22         Goal Note filed on 05/04/22 0824 by Karol Carlson PTA       PT kristine performed yesterday                 Problem: PT-Long Term Goals       Dates: Start: 05/03/22         Goal: LTG-By discharge, patient will ambulate with FWW x100' indoors SPV       Dates: Start: 05/03/22            Goal: LTG-By discharge, patient will transfer one surface to another Magen w/c<>bed       Dates: Start: 05/03/22            Goal: LTG-By discharge, patient will perform home exercise program with set up       Dates: Start: 05/03/22            Goal: LTG-By discharge, patient will ambulate up/down 4-6 stairs SPV with 1 HR       Dates: Start: 05/03/22              "

## 2022-05-04 NOTE — THERAPY
Physical Therapy   Daily Treatment     Patient Name: Ashlee Sepulveda  Age:  72 y.o., Sex:  female  Medical Record #: [id number]  Today's Date: 5/4/2022     Precautions  Precautions: Fall Risk  Comments: mild cog. imp.    Subjective    Pt was lying in bed upon arrival, agreeable to session.  Pt teared up at the middle of session and able to continued w/ encouragement and redirection.     Objective       05/04/22 0701   Pain   Intervention Declines   Pain 0 - 10 Group   Pain Rating Scale (NPRS) 0   Cognition    Level of Consciousness Alert   Gait Functional Level of Assist    Gait Level Of Assist Minimal Assist   Assistive Device Front Wheel Walker   Distance (Feet)   (60ft x1, 100ft x1)   Deviation Ataxic;Decreased Base Of Support;Bradykinetic;Decreased Heel Strike;Other (Comment)  (L LE adducts, hyperextends with stance phase, decr clearance with swing phase, forward lean on FWW.)   Transfer Functional Level of Assist   Bed, Chair, Wheelchair Transfer Minimal Assist  (SPT w/ FWW)   Bed Chair Wheelchair Transfer Description Adaptive equipment;Increased time;Set-up of equipment;Supervision for safety;Verbal cueing   Sitting Lower Body Exercises   Sit to Stand 1 set of 10   Standing Lower Body Exercises   Mini Squat Partial  (1 x 5)   Bed Mobility    Supine to Sit Minimal Assist   Sit to Stand Contact Guard Assist   Scooting Contact Guard Assist   Neuro-Muscular Treatments   Neuro-Muscular Treatments Weight Shift Right;Weight Shift Left;Verbal Cuing;Sequencing;Tactile Cuing;Postural Changes   Comments cues for upright posture during ambulation; static standing in // bars w/o UEs support for 30 sec, sequencing during STS and hand placement; lateral WS in // bars 2 x 10   Interdisciplinary Plan of Care Collaboration   Patient Position at End of Therapy Seated;Call Light within Reach;Tray Table within Reach;Phone within Reach   PT Total Time Spent   PT Individual Total Time Spent (Mins) 60   PT Charge Group   PT Gait  "Training 2   PT Neuromuscular Re-Education / Balance 1   PT Therapeutic Activities 1   Supervising Physical Therapist Sigrid Sanders     Completed car xfer into [facility] w/ modA    Assessment    Pt tolerated session well focusing on ambulation w/ FWW and WB acceptance at LLE. Pt was motivated and encouraged by improving in ambulation distance and stated will cont to work hard during therapy session. Required constant cues for upright posture and increasing MERLIN during amb, but showing improvement in less L knee hyperextend during second lap.    Strengths: Able to follow instructions,Adequate strength,Good insight into deficits/needs,Independent prior level of function,Good carryover of learning,Motivated for self care and independence,Pleasant and cooperative,Supportive family,Willingly participates in therapeutic activities  Barriers: Decreased endurance,Hemiparesis,Impaired balance    Plan    Ambulation w/ FWW, STS from varying surface and work on hand placement, standing balance, general strengthening and endurance, bed mobility.    Passport items to be completed:  Get in/out of bed safely, in/out of a vehicle, safely use mobility device, walk or wheel around home/community, navigate up and down stairs, show how to get up/down from the ground, ensure home is accessible, demonstrate HEP, complete caregiver training     Physical Therapy Problems (Active)       Problem: Balance       Dates: Start: 05/03/22         Goal: STG-Within one week, patient will maintain dynamic standing perform Avila       Dates: Start: 05/03/22               Problem: Mobility       Dates: Start: 05/03/22         Goal: STG-Within one week, patient will ambulate household distance with FWW x150 CGA with improved posture/L knee stability       Dates: Start: 05/03/22            Goal: STG-Within one week, patient will ascend and descend four to six stairs 1 HR, 6\" steps with Lan       Dates: Start: 05/03/22               Problem: Mobility Transfers  "      Dates: Start: 05/03/22         Goal: STG-Within one week, patient will transfer bed to chair CGA SPT       Dates: Start: 05/03/22               Problem: PT-Long Term Goals       Dates: Start: 05/03/22         Goal: LTG-By discharge, patient will ambulate with FWW x100' indoors SPV       Dates: Start: 05/03/22            Goal: LTG-By discharge, patient will transfer one surface to another Magen w/c<>bed       Dates: Start: 05/03/22            Goal: LTG-By discharge, patient will perform home exercise program with set up       Dates: Start: 05/03/22            Goal: LTG-By discharge, patient will ambulate up/down 4-6 stairs SPV with 1 HR       Dates: Start: 05/03/22

## 2022-05-04 NOTE — DISCHARGE PLANNING
CM met with patients  and step daughter Nancie to discuss CM and DC planning.  There was some confusion on the visitation policy.  CM clarified.   will be one of the designated visitors and they are not sure who will be the second.  CM walked  back to patients room and then showed Nancie how to find patients room/window from the outside.      CM will update patient and her family after IDTC later today.      CM available as needs arise.

## 2022-05-04 NOTE — CARE PLAN
The patient is Stable - Low risk of patient condition declining or worsening    Shift Goals  Clinical Goals: safety  Patient Goals: safety      Problem: Fall Risk - Rehab  Goal: Patient will remain free from falls  Outcome: Not Met Patient can be impulsive and does need reminders to use the call light when assistance is needed. Patient has alarms set on bed and w/c, will continue to educate as needed.     Problem: Infection  Goal: Patient will remain free from infection  Outcome: Not Met UA collected, patient denies  complaints such as fever, redness or pain. Will continue to monitor.

## 2022-05-04 NOTE — CARE PLAN
The patient is Stable - Low risk of patient condition declining or worsening    Shift Goals  Clinical Goals: Safety w/transfers, promote activity  Patient Goals: Increase strength    Progress made toward(s) clinical / shift goals:  Patient participated in therapy, uses call light appropriately and waits for staff assistance.    Patient is not progressing towards the following goals:      Problem: Knowledge Deficit - Standard  Goal: Patient and family/care givers will demonstrate understanding of plan of care, disease process/condition, diagnostic tests and medications  Outcome: Progressing  Note: Spouse of patient brought in home medications for reconciliation with home regimen. Pharmacy and physician to collaborate and change orders as needed.     Problem: Bowel Elimination  Goal: Patient will participate in bowel management program  Outcome: Progressing  Note: Patient had BM x2 this morning. Nursing to continue to offer medications as ordered. Patient has the right to refuse medications.

## 2022-05-04 NOTE — THERAPY
Occupational Therapy  Daily Treatment     Patient Name: Ashlee Sepulveda  Age:  72 y.o., Sex:  female  Medical Record #: [id number]  Today's Date: 5/4/2022     Precautions  Precautions: (P) Fall Risk  Comments: mild cog. imp.         Subjective    Agreeable to tx activity presented this session      Objective     05/04/22 0901   Precautions   Precautions Fall Risk   Functional Level of Assist   Bed, Chair, Wheelchair Transfer Minimal Assist  (stand pivot  w/c <-> mat)   Neuro-Muscular Treatments   Neuro-Muscular Treatments Weight Shift Left   Comments seated edge of mat weight bear through left UE while reaching with the right.   Bed Mobility    Supine to Sit Standby Assist   Sit to Supine Standby Assist   Interdisciplinary Plan of Care Collaboration   Patient Position at End of Therapy Seated;Self Releasing Lap Belt Applied;Call Light within Reach;Tray Table within Reach   OT Total Time Spent   OT Individual Total Time Spent (Mins) 30   OT Charge Group   OT Neuromuscular Re-education / Balance 1   OT Therapeutic Exercise  1   Supine on mat  active assisted chest presses  and chest press followed by shoulder  flexion  using small PVC pipe frame      Assessment    Cues to maintain left grasp and cues to attend to left UE to achieve desired results with  supine  ther ex performed     Strengths: Able to follow instructions,Effective communication skills,Good insight into deficits/needs,Independent prior level of function,Manages pain appropriately,Motivated for self care and independence,Pleasant and cooperative,Supportive family,Willingly participates in therapeutic activities  Barriers: Decreased endurance,Generalized weakness,Hemiparesis,Home accessibility,Impaired activity tolerance,Impaired balance,Limited mobility (fragile skin)    Plan   Left UE neuro re ed  ADL IADL , related mobility  strength/ endurance  building   standing tolerance and balance activity       Occupational Therapy Goals (Active)       Problem:  Bathing       Dates: Start: 05/03/22         Goal: STG-Within one week, patient will bathe with Lan and use of AE as needed.       Dates: Start: 05/03/22         Goal Note filed on 05/04/22 0649 by DRU ReddO.T.[email or web address].       Evaluated day prior to this documentation   Limited by decreased balance, decreased left UE function due to  hemiparesis/Decreased attention/ coordination   Continue goal                  Problem: Dressing       Dates: Start: 05/03/22         Goal: STG-Within one week, patient will dress LB with Lan and use of AE as needed.       Dates: Start: 05/03/22         Goal Note filed on 05/04/22 0649 by GIANNI Redd.T.MARITZA.       Evaluated day prior to this documentation   Limited by decreased balance, decreased left UE function due to  hemiparesis/Decreased attention/ coordination   Continue goal                  Problem: Functional Transfers       Dates: Start: 05/03/22         Goal: STG-Within one week, patient will transfer to toilet with CGA and use of AE as needed.       Dates: Start: 05/03/22         Goal Note filed on 05/04/22 0649 by FROY Redd.O.T.MARITZA.       Evaluated day prior to this documentation   Limited by decreased balance, decreased left UE function due to  hemiparesis/Decreased attention/ coordination   Continue goal                  Problem: OT Long Term Goals       Dates: Start: 05/03/22         Goal: LTG-By discharge, patient will complete basic self care tasks with supervision to modified independence and use of AE as needed.       Dates: Start: 05/03/22            Goal: LTG-By discharge, patient will perform bathroom transfers with supervision to modified independence and use of AE as needed.       Dates: Start: 05/03/22               Problem: Toileting       Dates: Start: 05/03/22         Goal: STG-Within one week, patient will complete toileting tasks with Lan and use of AE as needed.       Dates: Start: 05/03/22         Goal Note filed on 05/04/22 0649 by Vinicius  AVEL Mendez.       Evaluated day prior to this documentation   Limited by decreased balance, decreased left UE function due to  hemiparesis/Decreased attention/ coordination   Continue goal

## 2022-05-04 NOTE — THERAPY
Speech Language Pathology  Daily Treatment     Patient Name: Ashlee Sepulveda  Age:  72 y.o., Sex:  female  Medical Record #: [id number]  Today's Date: 5/4/2022     Precautions  Precautions: Fall Risk  Comments: L carolyne, MCI    Subjective    Patient was seen for tx seated upright in bed. Her  was present for tx. Patient was alert and cooperative. She was very engaged and asked appropriate questions. SLP      Objective       05/04/22 1431   Precautions   Precautions Fall Risk   Comments L carolyne, MCI   Cognition   Simple Attention Within Functional Limits (6-7)   Complex Attention Minimal (4)   Medication Management  Minimal (4)   Interdisciplinary Plan of Care Collaboration   IDT Collaboration with  Family / Caregiver;Nursing   Patient Position at End of Therapy In Bed;Call Light within Reach;Tray Table within Reach;Phone within Reach;Family / Friend in Room   Collaboration Comments SO present for tx, SLP informed RN that pt was using CPAP at home   SLP Total Time Spent   SLP Individual Total Time Spent (Mins) 60   Treatment Charges   SLP Cognitive Skill Development First 15 Minutes 1   SLP Cognitive Skill Development Additional 15 Minutes 1       Assessment    SLP provided patient with a current medication list. Patient required minimal cues to transfer the list to a medication log. Cues were mostly for where to orient in the text to determine the dosage and frequency. Patient recalled the purpose for 2 medications from home.     SLP also provided patient with a memory book and orientation to staff page. Patient recalled 2/2 meals, the names of 2/3 therapists from today and 3/3 activities with therapy. Patient independently completed the memory log.     Strengths: Able to follow instructions,Effective communication skills,Good insight into deficits/needs  Barriers: Impaired functional cognition    Plan    Cont medication management tasks    Passport items to be completed:  [unfilled]    Speech Therapy Problems  (Active)       Problem: Memory STGs       Dates: Start: 05/03/22         Goal: STG-Within one week, patient will use external aids and internal memory strategies to recall new information with 90% accuracy and minimal cues       Dates: Start: 05/03/22               Problem: Problem Solving STGs       Dates: Start: 05/03/22         Goal: STG-Within one week, patient will complete medication management tasks with 90% accuracy and minimal cues       Dates: Start: 05/03/22         Goal Note filed on 05/04/22 1150 by Diamond Salomon MS,CCC-SLP       New goal - Established 5/3/22              Goal: STG-Within one week, patient will complete functional problem solving tasks related to IADLs with 90% accuracy and minimal cues       Dates: Start: 05/03/22         Goal Note filed on 05/04/22 1150 by Diamond Salomon MS,CCC-SLP       New goal - Established 5/3/22                 Problem: Speech/Swallowing LTGs       Dates: Start: 05/03/22         Goal: LTG-By discharge, patient will improve cognitive skills from the mild impairment range to the typical functioning range, as indicated by standardized assessment and functional skill level       Dates: Start: 05/03/22

## 2022-05-04 NOTE — CARE PLAN
The patient is Stable - Low risk of patient condition declining or worsening    Shift Goals  Clinical Goals: safety  Patient Goals: safety    Progress made toward(s) clinical / shift goals:      Problem: Fall Risk - Rehab  Goal: Patient will remain free from falls  Outcome: Progressing  Note: Patient is a LOW FALL RISK     Problem: Bowel Elimination  Goal: Patient will participate in bowel management program  Outcome: Progressing  Note: Patient is continent of bowel and had a BM this shift

## 2022-05-04 NOTE — CARE PLAN
"  Problem: Balance  Goal: STG-Within one week, patient will maintain dynamic standing perform Avila  Outcome: Not Met  Note: PT eval performed yesterday     Problem: Mobility  Goal: STG-Within one week, patient will ambulate household distance with FWW x150 CGA with improved posture/L knee stability  Outcome: Not Met  Note: PT eval performed yesterday  Goal: STG-Within one week, patient will ascend and descend four to six stairs 1 HR, 6\" steps with Lan  Outcome: Not Met  Note: PT eval performed yesterday     Problem: Mobility Transfers  Goal: STG-Within one week, patient will transfer bed to chair CGA SPT  Outcome: Not Met  Note: PT eval performed yesterday     "

## 2022-05-04 NOTE — PROGRESS NOTES
[facility] Medicine Daily Progress Note    Date of Service  5/4/2022    Chief Complaint:  Hypertension  Leukocytosis  CKD    Interval History:  Discussed with pt about her CXR was unremarkable and will f/u with the .    Review of Systems  Review of Systems   Constitutional: Negative for fever.   Eyes: Negative for blurred vision.   Respiratory: Negative for shortness of breath.    Cardiovascular: Negative for palpitations.   Gastrointestinal: Negative for nausea and vomiting.   Neurological: Negative for dizziness and headaches.   Psychiatric/Behavioral: Negative for hallucinations.        Physical Exam  Temp:  [36.4 °C (97.5 °F)-36.7 °C (98.1 °F)] 36.4 °C (97.5 °F)  Pulse:  [70-74] 70  Resp:  [18-20] 20  BP: (105-118)/(63-65) 105/63  SpO2:  [94 %-98 %] 98 %    Physical Exam  Vitals and nursing note reviewed.   Constitutional:       General: She is not in acute distress.  HENT:      Mouth/Throat:      Mouth: Mucous membranes are moist.      Pharynx: Oropharynx is clear.   Eyes:      General: No scleral icterus.  Neck:      Vascular: No JVD.   Cardiovascular:      Rate and Rhythm: Normal rate and regular rhythm.      Heart sounds: Normal heart sounds. No murmur heard.  Pulmonary:      Effort: Pulmonary effort is normal.      Breath sounds: Normal breath sounds. No stridor.   Abdominal:      General: There is no distension.      Palpations: Abdomen is soft.      Tenderness: There is no abdominal tenderness.   Musculoskeletal:      Cervical back: No rigidity.      Right lower leg: No edema.      Left lower leg: No edema.   Skin:     General: Skin is warm and dry.      Findings: No rash.   Neurological:      Mental Status: She is alert and oriented to person, place, and time.   Psychiatric:         Mood and Affect: Mood normal.         Behavior: Behavior normal.         Fluids    Intake/Output Summary (Last 24 hours) at 5/4/2022 0933  Last data filed at 5/3/2022 1930  Gross per 24 hour   Intake 600 ml   Output --   Net  600 ml       Laboratory  Recent Labs     05/03/22  0603   WBC 13.3*   RBC 3.86*   HEMOGLOBIN 11.7*   HEMATOCRIT 36.4*   MCV 94.3   MCH 30.3   MCHC 32.1*   RDW 51.1*   PLATELETCT 298   MPV 10.5     Recent Labs     05/03/22  0603   SODIUM 136   POTASSIUM 5.2   CHLORIDE 104   CO2 19*   GLUCOSE 96   BUN 41*   CREATININE 1.28   CALCIUM 9.0             Recent Labs     05/03/22  0603   TRIGLYCERIDE 92   HDL 52   LDL 72       Imaging    Assessment/Plan  * Ischemic stroke (HCC)- (present on admission)  Assessment & Plan  S/P CVA  Had acute onset of left-sided weakness and slurred speech  MRI showed acute right frontal and parietal watershed strokes  Carotid U/S: negative for any significant stenosis  Echo: showed an EF of 35 to 40% as well as mild pericardial effusion  On ASA & Plavix  On Lipitor    Systolic CHF, chronic (HCC)- (present on admission)  Assessment & Plan  O2 sats good on RA  Echo: showed an EF of 35 to 40% as well as mild pericardial effusion  BNP: 1022 (5/3) -- no other values for comparison  CXR: unremarkable, no edema or effusions    Leukocytosis- (present on admission)  Assessment & Plan  WBC's: 13.3 (5/3)  Has been afebrile  CXR: unremarkable  U/A (+) --> will get C&S  Monitor    High potassium- (present on admission)  Assessment & Plan  K+: 5.2  ? 2nd to CKD  ? 2nd to Lisinopril --> d/c'd (last dose 5/3)  Monitor    CKD (chronic kidney disease)- (present on admission)  Assessment & Plan  Reported hx of CKD stage 3  Bun/Cr: 41/1.28  Not sure of baseline  Encouraging fluid intake  Monitor for now    Vitamin D deficiency- (present on admission)  Assessment & Plan  Vit D: 15  On supplements    Essential hypertension- (present on admission)  Assessment & Plan  BP ok  Off Lisinopril (last dose 5/3)  Monitor

## 2022-05-04 NOTE — CARE PLAN
Problem: Problem Solving STGs  Goal: STG-Within one week, patient will complete medication management tasks with 90% accuracy and minimal cues  Outcome: Not Met  Note: New goal - Established 5/3/22  Goal: STG-Within one week, patient will complete functional problem solving tasks related to IADLs with 90% accuracy and minimal cues  Outcome: Not Met  Note: New goal - Established 5/3/22

## 2022-05-04 NOTE — PROGRESS NOTES
Assumed care at 0700 following night nurse report. Patient A&O4, assessment completed, patient denies pain. Bed/chair alarm is on, call light and phone within reach, hourly rounding in place.

## 2022-05-04 NOTE — THERAPY
Occupational Therapy  Daily Treatment     Patient Name: Ashlee Sepulveda  Age:  72 y.o., Sex:  female  Medical Record #: [id number]  Today's Date: 5/4/2022     Precautions  Precautions: (P) Fall Risk  Comments: (P) L hemiplegia, mild cog impairment         Subjective     Agreeable to tx activity presented this session      Objective       05/04/22 1301   Precautions   Precautions Fall Risk   Comments L hemiplegia, mild cog impairment   Functional Level of Assist   Bed, Chair, Wheelchair Transfer Minimal Assist   Sitting Upper Body Exercises   Upper Extremity Bike Level 2 Resistance  (x 5 minutes motomed   symmetry  57% left 43%right   Khushbu reports she was focusing on making the left work harder.)   Other Exercise seated bilateral  acitved assisted shoulder flexion pushing davi box up graded incline   ( level 3)   Interdisciplinary Plan of Care Collaboration   IDT Collaboration with  Family / Caregiver   Patient Position at End of Therapy In Bed;Call Light within Reach;Tray Table within Reach   Collaboration Comments spouse present  and observing tx activity   OT Total Time Spent   OT Individual Total Time Spent (Mins) 30   OT Charge Group   OT Therapy Activity 1   OT Therapeutic Exercise  1     Seated at table worked on  active left UE functional  Reach grasp release   With bean bag  and drop  Using  Horizontal abduction to drop into box placed on left side  On the floor.   She also performed over hand toss of bean bags in to box.     Assessment    Limited by left hemiparesis  Specifically  Decreased shoulder flexion        Strengths: Able to follow instructions,Effective communication skills,Good insight into deficits/needs,Independent prior level of function,Manages pain appropriately,Motivated for self care and independence,Pleasant and cooperative,Supportive family,Willingly participates in therapeutic activities  Barriers: Decreased endurance,Generalized weakness,Hemiparesis,Home accessibility,Impaired  activity tolerance,Impaired balance,Limited mobility (fragile skin)    Plan    Left UE neuro re ed  ADL IADL , related mobility  strength/ endurance  building   standing tolerance and balance activity     Occupational Therapy Goals (Active)       Problem: Bathing       Dates: Start: 05/03/22         Goal: STG-Within one week, patient will bathe with Lan and use of AE as needed.       Dates: Start: 05/03/22         Goal Note filed on 05/04/22 0649 by REJI ReddTLEIGH.       Evaluated day prior to this documentation   Limited by decreased balance, decreased left UE function due to  hemiparesis/Decreased attention/ coordination   Continue goal                  Problem: Dressing       Dates: Start: 05/03/22         Goal: STG-Within one week, patient will dress LB with Lan and use of AE as needed.       Dates: Start: 05/03/22         Goal Note filed on 05/04/22 0649 by REJI ReddTLEIGH.       Evaluated day prior to this documentation   Limited by decreased balance, decreased left UE function due to  hemiparesis/Decreased attention/ coordination   Continue goal                  Problem: Functional Transfers       Dates: Start: 05/03/22         Goal: STG-Within one week, patient will transfer to toilet with CGA and use of AE as needed.       Dates: Start: 05/03/22         Goal Note filed on 05/04/22 0649 by DRU [email or web address].       Evaluated day prior to this documentation   Limited by decreased balance, decreased left UE function due to  hemiparesis/Decreased attention/ coordination   Continue goal                  Problem: OT Long Term Goals       Dates: Start: 05/03/22         Goal: LTG-By discharge, patient will complete basic self care tasks with supervision to modified independence and use of AE as needed.       Dates: Start: 05/03/22            Goal: LTG-By discharge, patient will perform bathroom transfers with supervision to modified independence and use of AE as needed.       Dates: Start:  05/03/22               Problem: Toileting       Dates: Start: 05/03/22         Goal: STG-Within one week, patient will complete toileting tasks with Lan and use of AE as needed.       Dates: Start: 05/03/22         Goal Note filed on 05/04/22 0649 by [email or web address]       Evaluated day prior to this documentation   Limited by decreased balance, decreased left UE function due to  hemiparesis/Decreased attention/ coordination   Continue goal

## 2022-05-05 LAB
ANION GAP SERPL CALC-SCNC: 11 MMOL/L (ref 7–16)
BASOPHILS # BLD AUTO: 0.4 % (ref 0–1.8)
BASOPHILS # BLD: 0.05 K/UL (ref 0–0.12)
BUN SERPL-MCNC: 51 MG/DL (ref 8–22)
CALCIUM SERPL-MCNC: 8.7 MG/DL (ref 8.5–10.5)
CHLORIDE SERPL-SCNC: 104 MMOL/L (ref 96–112)
CO2 SERPL-SCNC: 21 MMOL/L (ref 20–33)
CREAT SERPL-MCNC: 1.29 MG/DL (ref 0.5–1.4)
EOSINOPHIL # BLD AUTO: 0.18 K/UL (ref 0–0.51)
EOSINOPHIL NFR BLD: 1.6 % (ref 0–6.9)
ERYTHROCYTE [DISTWIDTH] IN BLOOD BY AUTOMATED COUNT: 54.7 FL (ref 35.9–50)
GFR SERPLBLD CREATININE-BSD FMLA CKD-EPI: 44 ML/MIN/1.73 M 2
GLUCOSE SERPL-MCNC: 94 MG/DL (ref 65–99)
HCT VFR BLD AUTO: 37.4 % (ref 37–47)
HGB BLD-MCNC: 11.6 G/DL (ref 12–16)
IMM GRANULOCYTES # BLD AUTO: 0.09 K/UL (ref 0–0.11)
IMM GRANULOCYTES NFR BLD AUTO: 0.8 % (ref 0–0.9)
LYMPHOCYTES # BLD AUTO: 2.08 K/UL (ref 1–4.8)
LYMPHOCYTES NFR BLD: 18.2 % (ref 22–41)
MAGNESIUM SERPL-MCNC: 2.4 MG/DL (ref 1.5–2.5)
MCH RBC QN AUTO: 30.9 PG (ref 27–33)
MCHC RBC AUTO-ENTMCNC: 31 G/DL (ref 33.6–35)
MCV RBC AUTO: 99.5 FL (ref 81.4–97.8)
MONOCYTES # BLD AUTO: 0.9 K/UL (ref 0–0.85)
MONOCYTES NFR BLD AUTO: 7.9 % (ref 0–13.4)
NEUTROPHILS # BLD AUTO: 8.12 K/UL (ref 2–7.15)
NEUTROPHILS NFR BLD: 71.1 % (ref 44–72)
NRBC # BLD AUTO: 0 K/UL
NRBC BLD-RTO: 0 /100 WBC
PHOSPHATE SERPL-MCNC: 3.5 MG/DL (ref 2.5–4.5)
PLATELET # BLD AUTO: 279 K/UL (ref 164–446)
PMV BLD AUTO: 10.4 FL (ref 9–12.9)
POTASSIUM SERPL-SCNC: 4.8 MMOL/L (ref 3.6–5.5)
RBC # BLD AUTO: 3.76 M/UL (ref 4.2–5.4)
SODIUM SERPL-SCNC: 136 MMOL/L (ref 135–145)
WBC # BLD AUTO: 11.4 K/UL (ref 4.8–10.8)

## 2022-05-05 PROCEDURE — 700102 HCHG RX REV CODE 250 W/ 637 OVERRIDE(OP): Performed by: PHYSICAL MEDICINE & REHABILITATION

## 2022-05-05 PROCEDURE — A9270 NON-COVERED ITEM OR SERVICE: HCPCS | Performed by: PHYSICAL MEDICINE & REHABILITATION

## 2022-05-05 PROCEDURE — 97116 GAIT TRAINING THERAPY: CPT | Mod: CQ

## 2022-05-05 PROCEDURE — 97535 SELF CARE MNGMENT TRAINING: CPT

## 2022-05-05 PROCEDURE — 700102 HCHG RX REV CODE 250 W/ 637 OVERRIDE(OP): Performed by: HOSPITALIST

## 2022-05-05 PROCEDURE — 36415 COLL VENOUS BLD VENIPUNCTURE: CPT

## 2022-05-05 PROCEDURE — 97130 THER IVNTJ EA ADDL 15 MIN: CPT | Performed by: SPEECH-LANGUAGE PATHOLOGIST

## 2022-05-05 PROCEDURE — 770010 HCHG ROOM/CARE - REHAB SEMI PRIVAT*

## 2022-05-05 PROCEDURE — 83735 ASSAY OF MAGNESIUM: CPT

## 2022-05-05 PROCEDURE — 700105 HCHG RX REV CODE 258: Performed by: HOSPITALIST

## 2022-05-05 PROCEDURE — 97129 THER IVNTJ 1ST 15 MIN: CPT | Performed by: SPEECH-LANGUAGE PATHOLOGIST

## 2022-05-05 PROCEDURE — 97530 THERAPEUTIC ACTIVITIES: CPT | Mod: CQ

## 2022-05-05 PROCEDURE — 84100 ASSAY OF PHOSPHORUS: CPT

## 2022-05-05 PROCEDURE — 97112 NEUROMUSCULAR REEDUCATION: CPT | Mod: CQ

## 2022-05-05 PROCEDURE — 85025 COMPLETE CBC W/AUTO DIFF WBC: CPT

## 2022-05-05 PROCEDURE — 99232 SBSQ HOSP IP/OBS MODERATE 35: CPT | Performed by: PHYSICAL MEDICINE & REHABILITATION

## 2022-05-05 PROCEDURE — 99232 SBSQ HOSP IP/OBS MODERATE 35: CPT | Performed by: HOSPITALIST

## 2022-05-05 PROCEDURE — 90791 PSYCH DIAGNOSTIC EVALUATION: CPT | Performed by: PSYCHOLOGIST

## 2022-05-05 PROCEDURE — A9270 NON-COVERED ITEM OR SERVICE: HCPCS | Performed by: HOSPITALIST

## 2022-05-05 PROCEDURE — 80048 BASIC METABOLIC PNL TOTAL CA: CPT

## 2022-05-05 RX ORDER — SODIUM CHLORIDE 9 MG/ML
1000 INJECTION, SOLUTION INTRAVENOUS ONCE
Status: DISCONTINUED | OUTPATIENT
Start: 2022-05-05 | End: 2022-05-05

## 2022-05-05 RX ORDER — SODIUM CHLORIDE 9 MG/ML
1000 INJECTION, SOLUTION INTRAVENOUS ONCE
Status: COMPLETED | OUTPATIENT
Start: 2022-05-05 | End: 2022-05-05

## 2022-05-05 RX ADMIN — Medication 1000 UNITS: at 08:08

## 2022-05-05 RX ADMIN — SODIUM CHLORIDE 1000 ML: 9 INJECTION, SOLUTION INTRAVENOUS at 12:35

## 2022-05-05 RX ADMIN — CLOPIDOGREL BISULFATE 75 MG: 75 TABLET ORAL at 08:08

## 2022-05-05 RX ADMIN — ASPIRIN 81 MG: 81 TABLET, COATED ORAL at 08:08

## 2022-05-05 RX ADMIN — ATORVASTATIN CALCIUM 40 MG: 40 TABLET, FILM COATED ORAL at 20:18

## 2022-05-05 RX ADMIN — ACETAMINOPHEN 650 MG: 325 TABLET ORAL at 20:21

## 2022-05-05 ASSESSMENT — GAIT ASSESSMENTS
ASSISTIVE DEVICE: FRONT WHEEL WALKER
DEVIATION: DECREASED BASE OF SUPPORT;DECREASED HEEL STRIKE;DECREASED TOE OFF
GAIT LEVEL OF ASSIST: CONTACT GUARD ASSIST
DISTANCE (FEET): 125

## 2022-05-05 ASSESSMENT — ENCOUNTER SYMPTOMS
DIZZINESS: 0
DIARRHEA: 0
COUGH: 0
NERVOUS/ANXIOUS: 0
FEVER: 0
BLURRED VISION: 0

## 2022-05-05 ASSESSMENT — ACTIVITIES OF DAILY LIVING (ADL)
TOILET_TRANSFER_DESCRIPTION: GRAB BAR;INCREASED TIME;SET-UP OF EQUIPMENT;SUPERVISION FOR SAFETY;VERBAL CUEING
BED_CHAIR_WHEELCHAIR_TRANSFER_DESCRIPTION: ADAPTIVE EQUIPMENT;INCREASED TIME;SUPERVISION FOR SAFETY;VERBAL CUEING

## 2022-05-05 NOTE — THERAPY
Speech Language Pathology  Daily Treatment     Patient Name: Ashlee Sepulveda  Age:  72 y.o., Sex:  female  Medical Record #: [id number]  Today's Date: 5/5/2022     Precautions  Precautions: Fall Risk  Comments: L carolyne, MCI    Subjective    Patient was seen for tx in speech office. Patient was pleasant and cooperative with all presented tasks.      Objective       05/05/22 0931   Precautions   Precautions Fall Risk   Comments L carolyne, MCI   Cognition   Functional Memory Activities Supervision (5)   Medication Management  Supervision (5)   Sleep/Wake Cycle   Sleep & Rest Awake;Out of bed   Interdisciplinary Plan of Care Collaboration   IDT Collaboration with  Physician;Therapy Tech   Patient Position at End of Therapy In Bed;Call Light within Reach;Tray Table within Reach;Phone within Reach   Collaboration Comments re: reduce ST to 30 minute sessions   SLP Total Time Spent   SLP Individual Total Time Spent (Mins) 60   Treatment Charges   SLP Cognitive Skill Development First 15 Minutes 1   SLP Cognitive Skill Development Additional 15 Minutes 3       Assessment    SLP provided patient with mock medication bottles/pills and pill organizer. Patient completed a functional medication sorting task with 100% accuracy independently on the initial trial. Patient reported that she has not used a pill organizer in the past but is agreeable to try one when she goes home.     SLP also provided tasks to target working memory. Patient completed as follows:    Word list retention-Inclusion (FO4): 92%  Word list retention-Inclusion (FO5): 72%  Word list retention-Exclusion (FO4): 90%  Word list retention-Exclusion (FO5):70%    Patient also independently completed her memory book and recalled the date as well as 2/2 therapists' names and 4/4 activities completed in therapy today.       Strengths: Able to follow instructions,Effective communication skills,Good insight into deficits/needs  Barriers: Impaired functional  cognition    Plan    Decrease ST services to 30 minute sessions   Cont to target medication management, memory and functional IADLs    Passport items to be completed:  [unfilled]    Speech Therapy Problems (Active)       Problem: Memory STGs       Dates: Start: 05/03/22         Goal: STG-Within one week, patient will use external aids and internal memory strategies to recall new information with 90% accuracy and minimal cues       Dates: Start: 05/03/22               Problem: Problem Solving STGs       Dates: Start: 05/03/22         Goal: STG-Within one week, patient will complete medication management tasks with 90% accuracy and minimal cues       Dates: Start: 05/03/22         Goal Note filed on 05/04/22 1150 by Diamond Salomon MS,CCC-SLP       New goal - Established 5/3/22              Goal: STG-Within one week, patient will complete functional problem solving tasks related to IADLs with 90% accuracy and minimal cues       Dates: Start: 05/03/22         Goal Note filed on 05/04/22 1150 by Diamond Salomon MS,CCC-SLP       New goal - Established 5/3/22                 Problem: Speech/Swallowing LTGs       Dates: Start: 05/03/22         Goal: LTG-By discharge, patient will improve cognitive skills from the mild impairment range to the typical functioning range, as indicated by standardized assessment and functional skill level       Dates: Start: 05/03/22

## 2022-05-05 NOTE — THERAPY
Occupational Therapy  Daily Treatment     Patient Name: Ashlee Sepulveda  Age:  72 y.o., Sex:  female  Medical Record #: [id number]  Today's Date: 5/5/2022     Precautions  Precautions: Fall Risk  Comments: L carolyne, MCI         Subjective    Pt supine in bed upon arrival, pleasant and cooperative, agreeable to therapy and shower. Spouse and son present and able to observe pt retrieving items from closet prior to shower     Objective       05/05/22 1331   Functional Level of Assist   Grooming Supervision;Seated   Bathing Contact Guard Assist  (when standing)   Upper Body Dressing Minimal Assist  (assist with sports bra)   Lower Body Dressing Minimal Assist  (assist to don socks, adjust brief/pants at hips)   Toilet Transfers Contact Guard Assist  (wiht FWW)   Tub / Shower Transfers Contact Guard Assist  (with FWW)   IADL Treatments   Comments pt retrieved clothing from closet prior to shower and was able to set out items on bed at [facility] with Min A - required assist to grab hangers, but otherwise CGA. Pt required assist to transfer items from bed to bathroom and have set-up   Bed Mobility    Supine to Sit Standby Assist   Sit to Supine Standby Assist   Interdisciplinary Plan of Care Collaboration   Patient Position at End of Therapy Seated;Call Light within Reach;Tray Table within Reach   OT Total Time Spent   OT Individual Total Time Spent (Mins) 60   OT Charge Group   OT Self Care / ADL 4     Functional mobility in room and bathroom at [facility] - CGA, x1 to cue for correct L hand placement on walker    Assessment    Pt completed shower routine at Min A overall using FWW, shower bench, GB's. Pt's functional performance was impacted by difficulty donning socks but pt mentioned that she also couldn't don her socks at baseline, impaired standing balance requiring CGA when pulling brief/pants up, and LUE weakness - but used it to the best of her abilities     Strengths: Able to follow instructions,Effective communication skills,Good  insight into deficits/needs,Independent prior level of function,Manages pain appropriately,Motivated for self care and independence,Pleasant and cooperative,Supportive family,Willingly participates in therapeutic activities  Barriers: Decreased endurance,Generalized weakness,Hemiparesis,Home accessibility,Impaired activity tolerance,Impaired balance,Limited mobility (fragile skin)    Plan    Refer to Primary OT POC/goals     Occupational Therapy Goals (Active)       Problem: Bathing       Dates: Start: 05/03/22         Goal: STG-Within one week, patient will bathe with Lan and use of AE as needed.       Dates: Start: 05/03/22         Goal Note filed on 05/04/22 0649 by Vinicius Kimble C.O.T.A.       Evaluated day prior to this documentation   Limited by decreased balance, decreased left UE function due to  hemiparesis/Decreased attention/ coordination   Continue goal                  Problem: Dressing       Dates: Start: 05/03/22         Goal: STG-Within one week, patient will dress LB with Lan and use of AE as needed.       Dates: Start: 05/03/22         Goal Note filed on 05/04/22 0649 by Vinicius Kimble, C.O.T.A.       Evaluated day prior to this documentation   Limited by decreased balance, decreased left UE function due to  hemiparesis/Decreased attention/ coordination   Continue goal                  Problem: Functional Transfers       Dates: Start: 05/03/22         Goal: STG-Within one week, patient will transfer to toilet with CGA and use of AE as needed.       Dates: Start: 05/03/22         Goal Note filed on 05/04/22 0649 by Vinicius Kimble, C.O.T.A.       Evaluated day prior to this documentation   Limited by decreased balance, decreased left UE function due to  hemiparesis/Decreased attention/ coordination   Continue goal                  Problem: OT Long Term Goals       Dates: Start: 05/03/22         Goal: LTG-By discharge, patient will complete basic self care tasks with supervision to modified  independence and use of AE as needed.       Dates: Start: 05/03/22            Goal: LTG-By discharge, patient will perform bathroom transfers with supervision to modified independence and use of AE as needed.       Dates: Start: 05/03/22               Problem: Toileting       Dates: Start: 05/03/22         Goal: STG-Within one week, patient will complete toileting tasks with Lan and use of AE as needed.       Dates: Start: 05/03/22         Goal Note filed on 05/04/22 0649 by DRU [email or web address]       Evaluated day prior to this documentation   Limited by decreased balance, decreased left UE function due to  hemiparesis/Decreased attention/ coordination   Continue goal

## 2022-05-05 NOTE — CARE PLAN
The patient is Stable - Low risk of patient condition declining or worsening    Shift Goals  Clinical Goals: Safety w/transfers, promote activity  Patient Goals: Increase strength    Progress made toward(s) clinical / shift goals:    Problem: Fall Risk - Rehab  Goal: Patient will remain free from falls  Note: Ella Sandoval Fall risk Assessment Score: 10    Low fall risk interventions   - Call light within reach   - Yellow  socks   - Belongings within reach   - Bed in the lowest position     Sleeping comfortably during rounds. No pain cues noted. VSS.

## 2022-05-05 NOTE — CARE PLAN
Problem: Bladder / Voiding  Goal: Patient will establish and maintain bladder regimen  Outcome: Progressing  Note: Patient is continent of bladder this shift.  Will continue to monitor.    The patient is Stable - Low risk of patient condition declining or worsening    Shift Goals  Clinical Goals: Safety w/transfers, promote activity  Patient Goals: Increase strength    Progress made toward(s) clinical / shift goals:  no problems with incontinence    Patient is not progressing towards the following goals:

## 2022-05-05 NOTE — PROGRESS NOTES
[facility] Medicine Daily Progress Note    Date of Service  5/5/2022    Chief Complaint:  Hypertension  Leukocytosis  CKD    Interval History:  Discussed with pt about her getting more dehydrated -- will give gentle IVF's for 1/2 liter and pt to increase her fluid intake.    Review of Systems  Review of Systems   Constitutional: Negative for fever.   Eyes: Negative for blurred vision.   Respiratory: Negative for cough.    Cardiovascular: Negative for chest pain.   Gastrointestinal: Negative for diarrhea.   Musculoskeletal: Negative for joint pain.   Neurological: Negative for dizziness.   Psychiatric/Behavioral: The patient is not nervous/anxious.         Physical Exam  Temp:  [36.4 °C (97.6 °F)-36.6 °C (97.9 °F)] 36.5 °C (97.7 °F)  Pulse:  [69-78] 69  Resp:  [18-20] 20  BP: (101-120)/(67-71) 120/68  SpO2:  [93 %-99 %] 99 %    Physical Exam  Vitals and nursing note reviewed.   Constitutional:       Appearance: She is not diaphoretic.   HENT:      Mouth/Throat:      Pharynx: No oropharyngeal exudate or posterior oropharyngeal erythema.   Eyes:      Extraocular Movements: Extraocular movements intact.   Neck:      Vascular: No carotid bruit or JVD.   Cardiovascular:      Rate and Rhythm: Normal rate and regular rhythm.      Heart sounds: Normal heart sounds. No murmur heard.  Pulmonary:      Effort: Pulmonary effort is normal.      Breath sounds: Normal breath sounds. No stridor.   Abdominal:      General: Bowel sounds are normal.      Palpations: Abdomen is soft.   Musculoskeletal:      Right lower leg: No edema.      Left lower leg: No edema.   Skin:     General: Skin is warm and dry.      Findings: No rash.   Neurological:      Mental Status: She is alert and oriented to person, place, and time.   Psychiatric:         Mood and Affect: Mood normal.         Behavior: Behavior normal.         Fluids    Intake/Output Summary (Last 24 hours) at 5/5/2022 0980  Last data filed at 5/5/2022 0900  Gross per 24 hour   Intake  600 ml   Output --   Net 600 ml       Laboratory  Recent Labs     05/03/22  0603 05/05/22  0542   WBC 13.3* 11.4*   RBC 3.86* 3.76*   HEMOGLOBIN 11.7* 11.6*   HEMATOCRIT 36.4* 37.4   MCV 94.3 99.5*   MCH 30.3 30.9   MCHC 32.1* 31.0*   RDW 51.1* 54.7*   PLATELETCT 298 279   MPV 10.5 10.4     Recent Labs     05/03/22  0603 05/05/22  0542   SODIUM 136 136   POTASSIUM 5.2 4.8   CHLORIDE 104 104   CO2 19* 21   GLUCOSE 96 94   BUN 41* 51*   CREATININE 1.28 1.29   CALCIUM 9.0 8.7             Recent Labs     05/03/22  0603   TRIGLYCERIDE 92   HDL 52   LDL 72       Imaging    Assessment/Plan  * Ischemic stroke (HCC)- (present on admission)  Assessment & Plan  S/P CVA  Had acute onset of left-sided weakness and slurred speech  MRI showed acute right frontal and parietal watershed strokes  Carotid U/S: negative for any significant stenosis  Echo: showed an EF of 35 to 40% as well as mild pericardial effusion  On ASA & Plavix  On Lipitor    Systolic CHF, chronic (HCC)- (present on admission)  Assessment & Plan  O2 sats good on RA  Echo: showed an EF of 35 to 40% as well as mild pericardial effusion  BNP: 1022 (5/3) -- no other values for comparison  CXR: unremarkable, no edema or effusions    Leukocytosis- (present on admission)  Assessment & Plan  WBC's: 13.3 (5/3) --> 11.4 (5/5)  Has been afebrile  CXR: unremarkable  U/A (+) --> f/u C&S  Monitor    High potassium- (present on admission)  Assessment & Plan  K+: 5.2 --> 4.8 (5/5)  ? 2nd to CKD  ? 2nd to Lisinopril --> d/c'd (last dose 5/3)  Monitor    CKD (chronic kidney disease)- (present on admission)  Assessment & Plan  Reported hx of CKD stage 3  Bun/Cr: 41/1.28 --> 51/1.29  Not sure of baseline  Encouraging fluid intake  Will give gentle IVF's for 1/2 liter  Monitor for now    Vitamin D deficiency- (present on admission)  Assessment & Plan  Vit D: 15  On supplements    Essential hypertension- (present on admission)  Assessment & Plan  BP ok  Off Lisinopril (last dose  5/3)  Monitor

## 2022-05-05 NOTE — PROGRESS NOTES
"Rehab Progress Note     Date of Service: 5/5/2022  Chief Complaint: Follow-up stroke    Interval Events (Subjective)    Patient seen and examined today in the therapy gym.   She just walked around the gym twice today with PT.  She denies any pain and is happy with her discharge date.  She does endorse depressed mood and was encouraged to express her tears.   Speech therapy to decrease to 30 min.  She has no other new concerns or complaints.   Urine culture still pending.    ROS: No changes to bowel, bladder, pain, mood, or sleep.       Objective:  VITAL SIGNS: /66   Pulse 68   Temp 36.3 °C (97.3 °F) (Oral)   Resp 20   Ht 1.676 m (5' 6\")   Wt 59 kg (130 lb)   SpO2 97%   BMI 20.98 kg/m²   Gen: alert, no apparent distress  CV: Regular rate and rhythm  Resp: Clear to auscultation bilaterally  Neuro: left hemiparesis  Psych: tearful    Recent Results (from the past 72 hour(s))   CoV-2 and Flu A/B by PCR (24 hour In-House): Collect NP swab in [place]    Collection Time: 05/02/22  4:23 PM    Specimen: Nasopharyngeal; Respirate   Result Value Ref Range    Influenza virus A RNA Negative Negative    Influenza virus B, PCR Negative Negative    SARS-CoV-2 by PCR NotDetected     SARS-CoV-2 Source NP Swab    CBC with Differential    Collection Time: 05/03/22  6:03 AM   Result Value Ref Range    WBC 13.3 (H) 4.8 - 10.8 K/uL    RBC 3.86 (L) 4.20 - 5.40 M/uL    Hemoglobin 11.7 (L) 12.0 - 16.0 g/dL    Hematocrit 36.4 (L) 37.0 - 47.0 %    MCV 94.3 81.4 - 97.8 fL    MCH 30.3 27.0 - 33.0 pg    MCHC 32.1 (L) 33.6 - 35.0 g/dL    RDW 51.1 (H) 35.9 - 50.0 fL    Platelet Count 298 164 - 446 K/uL    MPV 10.5 9.0 - 12.9 fL    Neutrophils-Polys 72.80 (H) 44.00 - 72.00 %    Lymphocytes 16.80 (L) 22.00 - 41.00 %    Monocytes 7.90 0.00 - 13.40 %    Eosinophils 1.40 0.00 - 6.90 %    Basophils 0.20 0.00 - 1.80 %    Immature Granulocytes 0.90 0.00 - 0.90 %    Nucleated RBC 0.00 /100 WBC    Neutrophils (Absolute) 9.68 (H) 2.00 - 7.15 K/uL    " Lymphs (Absolute) 2.24 1.00 - 4.80 K/uL    Monos (Absolute) 1.05 (H) 0.00 - 0.85 K/uL    Eos (Absolute) 0.19 0.00 - 0.51 K/uL    Baso (Absolute) 0.03 0.00 - 0.12 K/uL    Immature Granulocytes (abs) 0.12 (H) 0.00 - 0.11 K/uL    NRBC (Absolute) 0.00 K/uL   Comp Metabolic Panel (CMP)    Collection Time: 05/03/22  6:03 AM   Result Value Ref Range    Sodium 136 135 - 145 mmol/L    Potassium 5.2 3.6 - 5.5 mmol/L    Chloride 104 96 - 112 mmol/L    Co2 19 (L) 20 - 33 mmol/L    Anion Gap 13.0 7.0 - 16.0    Glucose 96 65 - 99 mg/dL    Bun 41 (H) 8 - 22 mg/dL    Creatinine 1.28 0.50 - 1.40 mg/dL    Calcium 9.0 8.5 - 10.5 mg/dL    AST(SGOT) 6 (L) 12 - 45 U/L    ALT(SGPT) 14 2 - 50 U/L    Alkaline Phosphatase 48 30 - 99 U/L    Total Bilirubin 0.4 0.1 - 1.5 mg/dL    Albumin 3.7 3.2 - 4.9 g/dL    Total Protein 6.0 6.0 - 8.2 g/dL    Globulin 2.3 1.9 - 3.5 g/dL    A-G Ratio 1.6 g/dL   Magnesium    Collection Time: 05/03/22  6:03 AM   Result Value Ref Range    Magnesium 2.5 1.5 - 2.5 mg/dL   Vitamin D, 25-hydroxy (blood)    Collection Time: 05/03/22  6:03 AM   Result Value Ref Range    25-Hydroxy   Vitamin D 25 15 (L) 30 - 100 ng/mL   proBrain Natriuretic Peptide, NT    Collection Time: 05/03/22  6:03 AM   Result Value Ref Range    NT-proBNP 1022 (H) 0 - 125 pg/mL   HEMOGLOBIN A1C    Collection Time: 05/03/22  6:03 AM   Result Value Ref Range    Glycohemoglobin 5.7 (H) 4.0 - 5.6 %    Est Avg Glucose 117 mg/dL   Lipid Profile    Collection Time: 05/03/22  6:03 AM   Result Value Ref Range    Cholesterol,Tot 142 100 - 199 mg/dL    Triglycerides 92 0 - 149 mg/dL    HDL 52 >=40 mg/dL    LDL 72 <100 mg/dL   IRON/TOTAL IRON BIND    Collection Time: 05/03/22  6:03 AM   Result Value Ref Range    Iron 49 40 - 170 ug/dL    Total Iron Binding 277 250 - 450 ug/dL    Unsat Iron Binding 228 110 - 370 ug/dL    % Saturation 18 15 - 55 %   ESTIMATED GFR    Collection Time: 05/03/22  6:03 AM   Result Value Ref Range    GFR (CKD-EPI) 44 (A) >60  mL/min/1.73 m 2   URINALYSIS    Collection Time: 05/03/22 10:50 AM    Specimen: Urine, Clean Catch   Result Value Ref Range    Color Yellow     Character Clear     Specific Gravity 1.020 <1.035    Ph 5.0 5.0 - 8.0    Glucose Negative Negative mg/dL    Ketones Negative Negative mg/dL    Protein Negative Negative mg/dL    Bilirubin Negative Negative    Urobilinogen, Urine 0.2 Negative    Nitrite Negative Negative    Leukocyte Esterase Moderate (A) Negative    Occult Blood Negative Negative    Micro Urine Req Microscopic    URINE MICROSCOPIC (W/UA)    Collection Time: 05/03/22 10:50 AM   Result Value Ref Range    WBC 20-50 (A) /hpf    RBC 0-2 /hpf    Bacteria Negative None /hpf    Epithelial Cells Few /hpf    Hyaline Cast 3-5 (A) /lpf   Basic Metabolic Panel    Collection Time: 05/05/22  5:42 AM   Result Value Ref Range    Sodium 136 135 - 145 mmol/L    Potassium 4.8 3.6 - 5.5 mmol/L    Chloride 104 96 - 112 mmol/L    Co2 21 20 - 33 mmol/L    Glucose 94 65 - 99 mg/dL    Bun 51 (H) 8 - 22 mg/dL    Creatinine 1.29 0.50 - 1.40 mg/dL    Calcium 8.7 8.5 - 10.5 mg/dL    Anion Gap 11.0 7.0 - 16.0   CBC WITH DIFFERENTIAL    Collection Time: 05/05/22  5:42 AM   Result Value Ref Range    WBC 11.4 (H) 4.8 - 10.8 K/uL    RBC 3.76 (L) 4.20 - 5.40 M/uL    Hemoglobin 11.6 (L) 12.0 - 16.0 g/dL    Hematocrit 37.4 37.0 - 47.0 %    MCV 99.5 (H) 81.4 - 97.8 fL    MCH 30.9 27.0 - 33.0 pg    MCHC 31.0 (L) 33.6 - 35.0 g/dL    RDW 54.7 (H) 35.9 - 50.0 fL    Platelet Count 279 164 - 446 K/uL    MPV 10.4 9.0 - 12.9 fL    Neutrophils-Polys 71.10 44.00 - 72.00 %    Lymphocytes 18.20 (L) 22.00 - 41.00 %    Monocytes 7.90 0.00 - 13.40 %    Eosinophils 1.60 0.00 - 6.90 %    Basophils 0.40 0.00 - 1.80 %    Immature Granulocytes 0.80 0.00 - 0.90 %    Nucleated RBC 0.00 /100 WBC    Neutrophils (Absolute) 8.12 (H) 2.00 - 7.15 K/uL    Lymphs (Absolute) 2.08 1.00 - 4.80 K/uL    Monos (Absolute) 0.90 (H) 0.00 - 0.85 K/uL    Eos (Absolute) 0.18 0.00 - 0.51  K/uL    Baso (Absolute) 0.05 0.00 - 0.12 K/uL    Immature Granulocytes (abs) 0.09 0.00 - 0.11 K/uL    NRBC (Absolute) 0.00 K/uL   MAGNESIUM    Collection Time: 05/05/22  5:42 AM   Result Value Ref Range    Magnesium 2.4 1.5 - 2.5 mg/dL   PHOSPHORUS    Collection Time: 05/05/22  5:42 AM   Result Value Ref Range    Phosphorus 3.5 2.5 - 4.5 mg/dL   ESTIMATED GFR    Collection Time: 05/05/22  5:42 AM   Result Value Ref Range    GFR (CKD-EPI) 44 (A) >60 mL/min/1.73 m 2       Current Facility-Administered Medications   Medication Frequency   • vitamin D3 (cholecalciferol) tablet 1,000 Units DAILY   • hydrOXYzine HCl (ATARAX) tablet 50 mg Q6HRS PRN   • melatonin tablet 3 mg HS PRN   • Respiratory Therapy Consult Continuous RT   • Pharmacy Consult Request ...Pain Management Review 1 Each PHARMACY TO DOSE   • hydrALAZINE (APRESOLINE) tablet 10 mg Q8HRS PRN   • acetaminophen (Tylenol) tablet 650 mg Q4HRS PRN   • senna-docusate (PERICOLACE or SENOKOT S) 8.6-50 MG per tablet 2 Tablet BID    And   • polyethylene glycol/lytes (MIRALAX) PACKET 1 Packet QDAY PRN    And   • magnesium hydroxide (MILK OF MAGNESIA) suspension 30 mL QDAY PRN    And   • bisacodyl (DULCOLAX) suppository 10 mg QDAY PRN   • lactulose 20 GM/30ML solution 30 mL QDAY PRN   • omeprazole (PRILOSEC) capsule 20 mg QAM AC   • artificial tears ophthalmic solution 1 Drop PRN   • benzocaine-menthol (Cepacol) lozenge 1 Lozenge Q2HRS PRN   • mag hydrox-al hydrox-simeth (MAALOX PLUS ES or MYLANTA DS) suspension 20 mL Q2HRS PRN   • ondansetron (ZOFRAN ODT) dispertab 4 mg 4X/DAY PRN    Or   • ondansetron (ZOFRAN) syringe/vial injection 4 mg 4X/DAY PRN   • traZODone (DESYREL) tablet 50 mg QHS PRN   • sodium chloride (OCEAN) 0.65 % nasal spray 2 Spray PRN   • aspirin EC (ECOTRIN) tablet 81 mg DAILY   • clopidogrel (PLAVIX) tablet 75 mg DAILY   • midazolam (VERSED) 5 mg/mL (1 mL vial) PRN   • atorvastatin (LIPITOR) tablet 40 mg Q EVENING       Orders Placed This Encounter    Procedures   • Diet Order Diet: Cardiac     Standing Status:   Standing     Number of Occurrences:   1     Order Specific Question:   Diet:     Answer:   Cardiac [6]       Assessment:    This patient is a 72 y.o. female admitted for acute inpatient rehabilitation with Ischemic stroke (HCC).    I led and attended the weekly conference, and agree with the IDT conference documentation and plan of care as noted below.    Date of conference: 5/4/2022    Goals and barriers: See IDT note.    Biggest barriers: left hemiparesis, knee hypertension, 3 stairs into home    CM/social support: family supportive    Anticipated DC date: 5/12    Home health: PT/OT/SLP/RN    Equip: FWW    Follow up: PCP, Dr. Wilfrid sim      Problem List/Medical Decision Making and Plan:    Right frontal parietal ischemic stroke  Left hemiparesis, improved  Nondominant  Left neglect, improved  Cognitive impairment, improved  Continue full rehab program  PT/OT/SLP, 1 hr each discipline, 5 days per week  5/5: decrease SLP to 30 min, share other 30 min with PPT/OT    Aspirin  Plavix  Eliquis    Outpatient follow up with Dr. Wilfrid sim, referrals made    Consult Dr. Ortega for adjustment to disability    Hypertension  Home medications included carvedilol 6.25 mg twice daily, lisinopril 20 mg daily, Lasix 20 mg daily, spironolactone 25 mg daily  Appreciate hospitalist assistance  Restarted on low-dose of lisinopril, now discontinued  Currently not requiring any medications for BP     Chronic systolic CHF  Ejection fraction of 35 to 40%  Elevated BNP  Low-dose lisinopril, discontinued due to low BPs  Previously on Lasix 20 mg daily and spironolactone 25 mg daily  Appreciate hospitalist assistance     Coronary artery disease  History of 3 coronary stents  Aspirin  Plavix  Statin  Eliquis  Outpatient follow up with cardiology    History of pacemaker placement  Paced rhythm  Outpatient follow up with cardiology    GI  prophylaxis  Omeprazole     Chronic kidney disease, stage III  Avoid nephrotoxins  Renally dose medications  Monitor with intermittent labs      COPD  Respiratory therapist evaluation    Leukocytosis, improved  Afebrile  Negative CXR  Positive UA  Appreciate hospitalist assistance    Positive urinalysis  Polyuria  Culture ordered, pending     Peripheral vascular disease  History of stents in the bilateral iliac arteries  Aspirin  Plavix  Statin  Eliquis  Outpatient follow up with vascular surgery as needed     Sleep apnea  Did not tolerate CPAP     Anemia  Monitor with intermittent labs    Vitamin D deficiency, 15  Continue supplementation    Bowel program  Continue bowel medications  Last BM 5/4    Bladder program  Check PVRs - 1  Bladder scan for no voids  ICP for over 400 cc  Scheduled tolieting    DVT prophylaxis  Eliquis      Total time:  28 minutes.  I spent greater than 50% of the time for patient care, counseling, and coordination on this date, including patient face-to face time, unit/floor time with review of records/pertinent lab data and studies, as well as discussing diagnostic evaluation/work up, planned therapeutic interventions, and future disposition of care, as per the interval events/subjective and the assessment and plan as noted above.    I have performed a physical exam, reviewed and updated ROS, as well as the assessment and plan today 5/5/2022. In review of note from 5/4/2022 there are no new changes except as documented above.      Abida Boyce M.D.   Physical Medicine and Rehabilitation

## 2022-05-06 ENCOUNTER — APPOINTMENT (OUTPATIENT)
Dept: RADIOLOGY | Facility: REHABILITATION | Age: 72
DRG: 057 | End: 2022-05-06
Attending: PHYSICAL MEDICINE & REHABILITATION
Payer: MEDICARE

## 2022-05-06 LAB
BACTERIA UR CULT: NORMAL
SIGNIFICANT IND 70042: NORMAL
SITE SITE: NORMAL
SOURCE SOURCE: NORMAL

## 2022-05-06 PROCEDURE — A9270 NON-COVERED ITEM OR SERVICE: HCPCS | Performed by: HOSPITALIST

## 2022-05-06 PROCEDURE — A9270 NON-COVERED ITEM OR SERVICE: HCPCS | Performed by: PHYSICAL MEDICINE & REHABILITATION

## 2022-05-06 PROCEDURE — 97112 NEUROMUSCULAR REEDUCATION: CPT

## 2022-05-06 PROCEDURE — 99232 SBSQ HOSP IP/OBS MODERATE 35: CPT | Performed by: HOSPITALIST

## 2022-05-06 PROCEDURE — 770010 HCHG ROOM/CARE - REHAB SEMI PRIVAT*

## 2022-05-06 PROCEDURE — 97130 THER IVNTJ EA ADDL 15 MIN: CPT | Performed by: SPEECH-LANGUAGE PATHOLOGIST

## 2022-05-06 PROCEDURE — 97129 THER IVNTJ 1ST 15 MIN: CPT | Performed by: SPEECH-LANGUAGE PATHOLOGIST

## 2022-05-06 PROCEDURE — 97110 THERAPEUTIC EXERCISES: CPT

## 2022-05-06 PROCEDURE — 73502 X-RAY EXAM HIP UNI 2-3 VIEWS: CPT | Mod: LT

## 2022-05-06 PROCEDURE — 700102 HCHG RX REV CODE 250 W/ 637 OVERRIDE(OP): Performed by: HOSPITALIST

## 2022-05-06 PROCEDURE — 97530 THERAPEUTIC ACTIVITIES: CPT

## 2022-05-06 PROCEDURE — 99232 SBSQ HOSP IP/OBS MODERATE 35: CPT | Performed by: PHYSICAL MEDICINE & REHABILITATION

## 2022-05-06 PROCEDURE — 700102 HCHG RX REV CODE 250 W/ 637 OVERRIDE(OP): Performed by: PHYSICAL MEDICINE & REHABILITATION

## 2022-05-06 RX ORDER — TRAMADOL HYDROCHLORIDE 50 MG/1
50 TABLET ORAL EVERY 4 HOURS PRN
Status: DISCONTINUED | OUTPATIENT
Start: 2022-05-06 | End: 2022-05-12 | Stop reason: HOSPADM

## 2022-05-06 RX ORDER — OXYCODONE HYDROCHLORIDE 5 MG/1
5 TABLET ORAL EVERY 4 HOURS PRN
Status: DISCONTINUED | OUTPATIENT
Start: 2022-05-06 | End: 2022-05-12 | Stop reason: HOSPADM

## 2022-05-06 RX ADMIN — ATORVASTATIN CALCIUM 40 MG: 40 TABLET, FILM COATED ORAL at 19:56

## 2022-05-06 RX ADMIN — ACETAMINOPHEN 650 MG: 325 TABLET ORAL at 08:07

## 2022-05-06 RX ADMIN — OMEPRAZOLE 20 MG: 20 CAPSULE, DELAYED RELEASE ORAL at 08:04

## 2022-05-06 RX ADMIN — ASPIRIN 81 MG: 81 TABLET, COATED ORAL at 08:04

## 2022-05-06 RX ADMIN — ACETAMINOPHEN 650 MG: 325 TABLET ORAL at 20:32

## 2022-05-06 RX ADMIN — Medication 1000 UNITS: at 08:05

## 2022-05-06 RX ADMIN — CLOPIDOGREL BISULFATE 75 MG: 75 TABLET ORAL at 08:04

## 2022-05-06 ASSESSMENT — ENCOUNTER SYMPTOMS
NERVOUS/ANXIOUS: 0
ABDOMINAL PAIN: 0
CHILLS: 0
FEVER: 0
SHORTNESS OF BREATH: 0
VOMITING: 0
NAUSEA: 0
DIARRHEA: 0

## 2022-05-06 ASSESSMENT — GAIT ASSESSMENTS
ASSISTIVE DEVICE: FRONT WHEEL WALKER
DISTANCE (FEET): 25
DEVIATION: ANTALGIC
GAIT LEVEL OF ASSIST: MINIMAL ASSIST

## 2022-05-06 ASSESSMENT — PAIN DESCRIPTION - PAIN TYPE
TYPE: ACUTE PAIN
TYPE: ACUTE PAIN

## 2022-05-06 ASSESSMENT — ACTIVITIES OF DAILY LIVING (ADL)
BED_CHAIR_WHEELCHAIR_TRANSFER_DESCRIPTION: INCREASED TIME;SUPERVISION FOR SAFETY;VERBAL CUEING
BED_CHAIR_WHEELCHAIR_TRANSFER_DESCRIPTION: VERBAL CUEING
TOILET_TRANSFER_DESCRIPTION: GRAB BAR

## 2022-05-06 NOTE — PROGRESS NOTES
"Rehab Fall Note    Date of fall: 5/5/2022     Time of incident/discovery? 1700     Witnessed or Unwitnessed: unwitnessed     Assisted or unassisted?: unassisted     Staff member present? Nurse      Head strike?: yes, to right side of head    What is the patient's orientation status? oriented x 4    Location of fall: patient bathroom     Was this a fall with therapy? No      Patient had a fall from: toilet     Injury from fall: pt denies injury, no apparent injury noted     Persons contacted regarding the fall: charge RN, pt family, MD     Post fall huddle called: no     Persons present at post fall huddle: N/A     Interventions to prevent another fall: Reinforced to patient the need to pull the bathroom cord prior to standing from the toilet, the patient is oriented and aware that she is weaker on the left side and shouldn't have stood without staff present     Was the call light used? No     Did the bed or chair alarm sound? No     N/A     If no alarm sounded, do the alarms work? N/A     If alarm malfunctioned, was a maintenance request submitted? N/A     Was the pt. wearing a seatbelt prior to the fall? N/A     If wearing, did the pt. take off the seatbelt? N/A     What is the patient's transfer status? Min assist     Other fall details: I got the patient up to the bathroom. She seemed weak during her transfer to the wheelchair from her bed, but she managed it fine. She was able to stand without assist from the wheelchair and was able to pull her pants down to use the bathroom with contact guard assist. Once she was seated, I advised her to pull the red cord on the wall to let me know that she was finished prior to standing and that I would be right outside her door. I was standing outside the cracked bathroom door answering a question for her roommate when I heard the patient say \"Whoops!\". When I went into the bathroom, she was on the floor in front of her toilet facing the toilet like she was turning around. " She said that she hit the right ride of her head and landed on her left buttock. I got her off the floor and into the wheelchair. She tolerated that well and denied pain. Pt was able to stand from the wheelchair and get herself back to bed without problems. Vitals were stable except for the blood pressure, which was 169/86. After assessing the patient, the blood pressure went down to 142/78 after about 15 minutes. I believe she had been worked up and was embarrassed. Notified Alis Kaufman (charge RN), and the patients family. Will monitor patient status.

## 2022-05-06 NOTE — THERAPY
Physical Therapy   Daily Treatment     Patient Name: Ashlee Sepulveda  Age:  72 y.o., Sex:  female  Medical Record #: [id number]  Today's Date: 5/6/2022     Precautions  Precautions: Fall Risk,Other (See Comments)  Comments: (P) hx of falls,L carolyne, MCI, NWB LLE until xray result come back(5/6)    Subjective    Pt seated in w/c upon arrival, agreeable to session.     Objective       05/06/22 1001   Precautions   Comments hx of falls,L carolyne, MCI, NWB LLE until xray result come back(5/6)   Pain 0 - 10 Group   Location Hip   Pain Rating Scale (NPRS) 8   Therapist Pain Assessment During Activity   Cognition    Level of Consciousness Alert   Sitting Lower Body Exercises   Sitting Lower Body Exercises   (manual resistance)   Ankle Pumps 1 set of 10;Bilateral   Hip Abduction 1 set of 10;Bilateral   Hip Adduction 1 set of 10;Bilateral   Long Arc Quad 1 set of 10;Bilateral   Marching 1 set of 10  (RLE only)   Interdisciplinary Plan of Care Collaboration   IDT Collaboration with  Nursing;Physical Therapist;Physician;Occupational Therapist   Patient Position at End of Therapy Seated;Call Light within Reach;Tray Table within Reach;Phone within Reach   Collaboration Comments CLOF   PT Total Time Spent   PT Individual Total Time Spent (Mins) 30   PT Charge Group   PT Therapeutic Exercise 1   PT Therapeutic Activities 1   Supervising Physical Therapist Sigrid Sanders     Delivered seated LE therex handout and orange resistance band.    Assessment    Pt is currently NWB at LLE per order before xray result come back, pt expressed pain during hip flexion but was okay w/ abb/add    Strengths: Able to follow instructions,Adequate strength,Good insight into deficits/needs,Independent prior level of function,Good carryover of learning,Motivated for self care and independence,Pleasant and cooperative,Supportive family,Willingly participates in therapeutic activities  Barriers: Decreased endurance,Hemiparesis,Impaired balance    Plan    Ambulation  "w/ FWW, STS from varying surface and work on hand placement, standing balance, general strengthening and endurance, bed mobility.    Passport items to be completed:  Get in/out of bed safely, in/out of a vehicle, safely use mobility device, walk or wheel around home/community, navigate up and down stairs, show how to get up/down from the ground, ensure home is accessible, demonstrate HEP, complete caregiver training    Physical Therapy Problems (Active)       Problem: Balance       Dates: Start: 05/03/22         Goal: STG-Within one week, patient will maintain dynamic standing perform [place]       Dates: Start: 05/03/22         Goal Note filed on 05/04/22 0824 by Karol Carlson PTA       PT eval performed yesterday                 Problem: Mobility       Dates: Start: 05/03/22         Goal: STG-Within one week, patient will ambulate household distance with FWW x150 CGA with improved posture/L knee stability       Dates: Start: 05/03/22         Goal Note filed on 05/04/22 0824 by Karol Carlson PTA       PT eval performed yesterday              Goal: STG-Within one week, patient will ascend and descend four to six stairs 1 HR, 6\" steps with Lan       Dates: Start: 05/03/22         Goal Note filed on 05/04/22 0824 by Karol Carlson PTA       PT eval performed yesterday                 Problem: Mobility Transfers       Dates: Start: 05/03/22         Goal: STG-Within one week, patient will transfer bed to chair CGA SPT       Dates: Start: 05/03/22         Goal Note filed on 05/04/22 0824 by Karol Carlson PTA       PT eval performed yesterday                 Problem: PT-Long Term Goals       Dates: Start: 05/03/22         Goal: LTG-By discharge, patient will ambulate with FWW x100' indoors SPV       Dates: Start: 05/03/22            Goal: LTG-By discharge, patient will transfer one surface to another Magen w/c<>bed       Dates: Start: 05/03/22            Goal: LTG-By discharge, patient will perform home exercise program with " set up       Dates: Start: 05/03/22            Goal: LTG-By discharge, patient will ambulate up/down 4-6 stairs SPV with 1 HR       Dates: Start: 05/03/22

## 2022-05-06 NOTE — PROGRESS NOTES
"Rehab Progress Note     Date of Service: 5/6/2022  Chief Complaint: Follow-up stroke    Interval Events (Subjective)    Patient seen and examined today in her room.  She is complaining of sharp left hip pain today is 8 out of 10 when she weightbears.  She did have an unwitnessed fall in the bathroom yesterday when she tried to stand up on her own.  She did hit her head but did not have any neurological complaints or hip pain at the time.  She has no other new concerns or complaints today.    ROS: No changes to bowel, bladder, mood or sleep.        Objective:  VITAL SIGNS: /69   Pulse 73   Temp 36.6 °C (97.9 °F) (Oral)   Resp 18   Ht 1.676 m (5' 6\")   Wt 59 kg (130 lb)   SpO2 95%   BMI 20.98 kg/m²   Gen: alert, no apparent distress  MSK: No abnormal rotation of left leg  Neuro: notable for left hemiparesis    Recent Results (from the past 72 hour(s))   Basic Metabolic Panel    Collection Time: 05/05/22  5:42 AM   Result Value Ref Range    Sodium 136 135 - 145 mmol/L    Potassium 4.8 3.6 - 5.5 mmol/L    Chloride 104 96 - 112 mmol/L    Co2 21 20 - 33 mmol/L    Glucose 94 65 - 99 mg/dL    Bun 51 (H) 8 - 22 mg/dL    Creatinine 1.29 0.50 - 1.40 mg/dL    Calcium 8.7 8.5 - 10.5 mg/dL    Anion Gap 11.0 7.0 - 16.0   CBC WITH DIFFERENTIAL    Collection Time: 05/05/22  5:42 AM   Result Value Ref Range    WBC 11.4 (H) 4.8 - 10.8 K/uL    RBC 3.76 (L) 4.20 - 5.40 M/uL    Hemoglobin 11.6 (L) 12.0 - 16.0 g/dL    Hematocrit 37.4 37.0 - 47.0 %    MCV 99.5 (H) 81.4 - 97.8 fL    MCH 30.9 27.0 - 33.0 pg    MCHC 31.0 (L) 33.6 - 35.0 g/dL    RDW 54.7 (H) 35.9 - 50.0 fL    Platelet Count 279 164 - 446 K/uL    MPV 10.4 9.0 - 12.9 fL    Neutrophils-Polys 71.10 44.00 - 72.00 %    Lymphocytes 18.20 (L) 22.00 - 41.00 %    Monocytes 7.90 0.00 - 13.40 %    Eosinophils 1.60 0.00 - 6.90 %    Basophils 0.40 0.00 - 1.80 %    Immature Granulocytes 0.80 0.00 - 0.90 %    Nucleated RBC 0.00 /100 WBC    Neutrophils (Absolute) 8.12 (H) 2.00 - " 7.15 K/uL    Lymphs (Absolute) 2.08 1.00 - 4.80 K/uL    Monos (Absolute) 0.90 (H) 0.00 - 0.85 K/uL    Eos (Absolute) 0.18 0.00 - 0.51 K/uL    Baso (Absolute) 0.05 0.00 - 0.12 K/uL    Immature Granulocytes (abs) 0.09 0.00 - 0.11 K/uL    NRBC (Absolute) 0.00 K/uL   MAGNESIUM    Collection Time: 05/05/22  5:42 AM   Result Value Ref Range    Magnesium 2.4 1.5 - 2.5 mg/dL   PHOSPHORUS    Collection Time: 05/05/22  5:42 AM   Result Value Ref Range    Phosphorus 3.5 2.5 - 4.5 mg/dL   ESTIMATED GFR    Collection Time: 05/05/22  5:42 AM   Result Value Ref Range    GFR (CKD-EPI) 44 (A) >60 mL/min/1.73 m 2       Current Facility-Administered Medications   Medication Frequency   • vitamin D3 (cholecalciferol) tablet 1,000 Units DAILY   • hydrOXYzine HCl (ATARAX) tablet 50 mg Q6HRS PRN   • melatonin tablet 3 mg HS PRN   • Respiratory Therapy Consult Continuous RT   • Pharmacy Consult Request ...Pain Management Review 1 Each PHARMACY TO DOSE   • hydrALAZINE (APRESOLINE) tablet 10 mg Q8HRS PRN   • acetaminophen (Tylenol) tablet 650 mg Q4HRS PRN   • senna-docusate (PERICOLACE or SENOKOT S) 8.6-50 MG per tablet 2 Tablet BID    And   • polyethylene glycol/lytes (MIRALAX) PACKET 1 Packet QDAY PRN    And   • magnesium hydroxide (MILK OF MAGNESIA) suspension 30 mL QDAY PRN    And   • bisacodyl (DULCOLAX) suppository 10 mg QDAY PRN   • lactulose 20 GM/30ML solution 30 mL QDAY PRN   • omeprazole (PRILOSEC) capsule 20 mg QAM AC   • artificial tears ophthalmic solution 1 Drop PRN   • benzocaine-menthol (Cepacol) lozenge 1 Lozenge Q2HRS PRN   • mag hydrox-al hydrox-simeth (MAALOX PLUS ES or MYLANTA DS) suspension 20 mL Q2HRS PRN   • ondansetron (ZOFRAN ODT) dispertab 4 mg 4X/DAY PRN    Or   • ondansetron (ZOFRAN) syringe/vial injection 4 mg 4X/DAY PRN   • traZODone (DESYREL) tablet 50 mg QHS PRN   • sodium chloride (OCEAN) 0.65 % nasal spray 2 Spray PRN   • aspirin EC (ECOTRIN) tablet 81 mg DAILY   • clopidogrel (PLAVIX) tablet 75 mg DAILY    • midazolam (VERSED) 5 mg/mL (1 mL vial) PRN   • atorvastatin (LIPITOR) tablet 40 mg Q EVENING       Orders Placed This Encounter   Procedures   • Diet Order Diet: Cardiac     Standing Status:   Standing     Number of Occurrences:   1     Order Specific Question:   Diet:     Answer:   Cardiac [6]       Radiology    DX-HIP-COMPLETE - UNILATERAL 2+ LEFT   Final Result      No radiographic evidence of acute traumatic injury.      DX-CHEST-PORTABLE (1 VIEW)   Final Result         No acute cardiac or pulmonary abnormality is identified.            Assessment:    This patient is a 72 y.o. female admitted for acute inpatient rehabilitation with Ischemic stroke (HCC).    I led and attended the weekly conference, and agree with the IDT conference documentation and plan of care as noted below.    Date of conference: 5/4/2022    Goals and barriers: See IDT note.    Biggest barriers: left hemiparesis, knee hypertension, 3 stairs into home    CM/social support: family supportive    Anticipated DC date: 5/12    Home health: PT/OT/SLP/RN    Equip: FWW    Follow up: PCP, Dr. Wilfrid sim      Problem List/Medical Decision Making and Plan:    Right frontal parietal ischemic stroke  Left hemiparesis, improved  Nondominant  Left neglect, improved  Cognitive impairment, improved  Continue full rehab program  PT/OT/SLP, 1 hr each discipline, 5 days per week  5/5: decrease SLP to 30 min, share other 30 min with PPT/OT    Aspirin  Plavix  Eliquis    Outpatient follow up with Dr. Wilfrid sim, referrals made    Consult Dr. Ortega for adjustment to disability    Left hip pain  Status post fall 5/5, unwitnessed in bathroom  X-ray without any evidence of fracture    Hypertension  Home medications included carvedilol 6.25 mg twice daily, lisinopril 20 mg daily, Lasix 20 mg daily, spironolactone 25 mg daily  Appreciate hospitalist assistance  Restarted on low-dose of lisinopril, now discontinued  Currently not requiring any  medications for BP  Had 1 high blood pressure yesterday after her fall     Chronic systolic CHF  Ejection fraction of 35 to 40%  Elevated BNP  Low-dose lisinopril, discontinued due to low BPs  Previously on Lasix 20 mg daily and spironolactone 25 mg daily  Appreciate hospitalist assistance     Coronary artery disease  History of 3 coronary stents  Aspirin  Plavix  Statin  Eliquis  Outpatient follow up with cardiology    History of pacemaker placement  Paced rhythm  Outpatient follow up with cardiology    GI prophylaxis  Omeprazole     Chronic kidney disease, stage III  Avoid nephrotoxins  Renally dose medications  Monitor with intermittent labs      COPD  Respiratory therapist evaluation    Leukocytosis, improved  Afebrile  Negative CXR  Positive UA  Appreciate hospitalist assistance    Positive urinalysis  Polyuria, stable  Culture negative     Peripheral vascular disease  History of stents in the bilateral iliac arteries  Aspirin  Plavix  Statin  Eliquis  Outpatient follow up with vascular surgery as needed     Sleep apnea  Did not tolerate CPAP     Anemia  Monitor with intermittent labs    Vitamin D deficiency, 15  Continue supplementation    Bowel program  Continue bowel medications  Last BM 5/4    Bladder program  Check PVRs - 1  Bladder scan for no voids  ICP for over 400 cc  Scheduled tolieting    DVT prophylaxis  Eliquis      Total time:  29 minutes.  I spent greater than 50% of the time for patient care, counseling, and coordination on this date, including patient face-to face time, unit/floor time with review of records/pertinent lab data and studies, as well as discussing diagnostic evaluation/work up, planned therapeutic interventions, and future disposition of care, as per the interval events/subjective and the assessment and plan as noted above.    I have performed a physical exam, reviewed and updated ROS, as well as the assessment and plan today 5/6/2022. In review of note from 5/5/2022 there are no  new changes except as documented above.            Abida Boyce M.D.   Physical Medicine and Rehabilitation

## 2022-05-06 NOTE — THERAPY
Speech Language Pathology  Daily Treatment     Patient Name: Ashlee Sepulveda  Age:  72 y.o., Sex:  female  Medical Record #: [id number]  Today's Date: 5/6/2022     Precautions  Precautions: Fall Risk  Comments: hx of falls,L carolyne, MCI, NWB LLE until xray result come back(5/6)    Subjective    Patient was seen for tx, seated in w/c in her room. She was alert and eager to work with ST.      Objective       05/06/22 0901   Precautions   Precautions Fall Risk   Comments hx of falls, left carolyne   Cognition   Simple Attention Within Functional Limits (6-7)   Functional Math / Financial Management Supervision (5)   Sleep/Wake Cycle   Sleep & Rest Awake   Interdisciplinary Plan of Care Collaboration   Patient Position at End of Therapy Seated;Call Light within Reach;Tray Table within Reach   SLP Total Time Spent   SLP Individual Total Time Spent (Mins) 30   Treatment Charges   SLP Cognitive Skill Development First 15 Minutes 1   SLP Cognitive Skill Development Additional 15 Minutes 1       Assessment    SLP provided patient with tasks to target money math. Patient determined the amount of money when given pictured bills/coins with 100% accuracy independently. She also completed money math equations with 90% accuracy independently , increasing to 100% accuracy with SPV.     Strengths: Able to follow instructions,Effective communication skills,Good insight into deficits/needs  Barriers: Impaired functional cognition    Plan    Cont tx to target functional problem solving    Passport items to be completed:  [unfilled]    Speech Therapy Problems (Active)       Problem: Memory STGs       Dates: Start: 05/03/22         Goal: STG-Within one week, patient will use external aids and internal memory strategies to recall new information with 90% accuracy and minimal cues       Dates: Start: 05/03/22               Problem: Problem Solving STGs       Dates: Start: 05/03/22         Goal: STG-Within one week, patient will complete  medication management tasks with 90% accuracy and minimal cues       Dates: Start: 05/03/22         Goal Note filed on 05/04/22 1150 by Diamond Salomon MS,CCC-SLP       New goal - Established 5/3/22              Goal: STG-Within one week, patient will complete functional problem solving tasks related to IADLs with 90% accuracy and minimal cues       Dates: Start: 05/03/22         Goal Note filed on 05/04/22 1150 by Diamond Salomon MS,CCC-SLP       New goal - Established 5/3/22                 Problem: Speech/Swallowing LTGs       Dates: Start: 05/03/22         Goal: LTG-By discharge, patient will improve cognitive skills from the mild impairment range to the typical functioning range, as indicated by standardized assessment and functional skill level       Dates: Start: 05/03/22

## 2022-05-06 NOTE — PROGRESS NOTES
Received shift report and assumed care of patient.  Patient awake, calm and stable, currently positioned in bed for comfort and safety; call light within reach. Complains of pain to left hip that makes it difficult to bear weight. Notified MD. Will continue to monitor.

## 2022-05-06 NOTE — CARE PLAN
"The patient is Watcher - Medium risk of patient condition declining or worsening    Shift Goals  Clinical Goals: Safety w/transfers, promote activity  Patient Goals: Increase strength    Progress made toward(s) clinical / shift goals:    Problem: Bladder / Voiding  Goal: Patient will establish and maintain regular urinary output  Note: Continent of bladder.       Problem: Fall Risk - Rehab  Goal: Patient will remain free from falls  Note: Ella Sandoval Fall risk Assessment Score: 17    High fall risk Interventions   - Alarming seatbelt  - Bed and strip alarm   - Yellow sign by the door   - Yellow wrist band \"Fall risk\"  - Room near to the nurse station  - Do not leave patient unattended in the bathroom  - Fall risk education provided    S/P fall 5/5 unwitnessed fall in the bathroom during day shift. No changes in LOC nor changes in condition. VSS.     C/O left hip pain 3/10, medicated with Tylenol 650 mg PO, pt verbalized relief.    NS infusing at 50 ml/hr. X 1 li, started yesterday 5/5.        OOB-W/C-bathroom with min assist.     Encouraged to use call light consistently and appropriately. To for Wait for assistance and to not attempt self transfer. Able to verbalize needs.           "

## 2022-05-06 NOTE — THERAPY
"Physical Therapy   Daily Treatment     Patient Name: Ashlee Sepulveda  Age:  72 y.o., Sex:  female  Medical Record #: [id number]  Today's Date: 5/6/2022     Precautions  Precautions: Fall Risk,Other (See Comments)  Comments: hx of falls,L carolyne, MCI    Subjective    Pt received in room in bed, dressed and ready for therapy. \"I'm not sure how I'll do today because I fell yesterday.\" She reports L hip pain during supine->sit and WBing activities, which she describes as \"sharp.\"     Objective       05/06/22 0701   Precautions   Precautions Fall Risk;Other (See Comments)   Comments hx of falls,L carolyne, MCI   Pain 0 - 10 Group   Therapist Pain Assessment Prior to Activity;During Activity  (L hip pain in WBing, pt tearful due to pain)   Cognition    Level of Consciousness Alert   Sleep/Wake Cycle   Sleep & Rest Awake   Gait Functional Level of Assist    Gait Level Of Assist Minimal Assist   Assistive Device Front Wheel Walker   Distance (Feet) 25   # of Times Distance was Traveled 1   Deviation Antalgic   Transfer Functional Level of Assist   Bed, Chair, Wheelchair Transfer Minimal Assist  (SPT with reach, poor wt acceptance L LE)   Bed Chair Wheelchair Transfer Description Verbal cueing   Toilet Transfers Minimal Assist   Toilet Transfer Description Grab bar   Sitting Lower Body Exercises   Ankle Pumps 3 sets of 10;Bilateral   Long Arc Quad 2 sets of 10;Left  (with DF, 6\" hold)   Sit to Stand   (x5 reps mat<>FWW with SBA, instructionin hand placement- pushing up with 1 hand and reaching back to seat prior to sitting)   Nustep Resistance Level 2  (UE+LE propulsion)   Bed Mobility    Supine to Sit Contact Guard Assist   Sit to Stand Contact Guard Assist   Interdisciplinary Plan of Care Collaboration   IDT Collaboration with  Nursing   Patient Position at End of Therapy Seated;Chair Alarm On;Self Releasing Lap Belt Applied;Call Light within Reach;Tray Table within Reach;Phone within Reach  (PT reviewed use of call light with pt, " "she verbalizes good understanding)   Collaboration Comments PT notified RN and charge RN that pt is reporting L hip pain this morning in WBing following a fall yesterday. PT recommended x-ray of L hip to r/o fracture   PT Total Time Spent   PT Individual Total Time Spent (Mins) 60   PT Charge Group   PT Therapeutic Exercise 3   PT Therapeutic Activities 1       Assessment    Pt limited by L hip pain during WBing activities this session. Significant decrease in ambulation distance this session compared to yesterday's session. PT notified charge RN and pt's RN and recommended X-ray to r/o L hip fracture.     Strengths: Able to follow instructions,Adequate strength,Good insight into deficits/needs,Independent prior level of function,Good carryover of learning,Motivated for self care and independence,Pleasant and cooperative,Supportive family,Willingly participates in therapeutic activities  Barriers: Decreased endurance,Hemiparesis,Impaired balance    Plan    Ambulation w/ FWW, STS from varying surface and work on hand placement, standing balance, general strengthening and endurance, bed mobility.      Physical Therapy Problems (Active)       Problem: Balance       Dates: Start: 05/03/22         Goal: STG-Within one week, patient will maintain dynamic standing perform Avila       Dates: Start: 05/03/22         Goal Note filed on 05/04/22 0824 by Karol Carlson PTA       PT eval performed yesterday                 Problem: Mobility       Dates: Start: 05/03/22         Goal: STG-Within one week, patient will ambulate household distance with FWW x150 CGA with improved posture/L knee stability       Dates: Start: 05/03/22         Goal Note filed on 05/04/22 0824 by Karol Carlson PTA       PT eval performed yesterday              Goal: STG-Within one week, patient will ascend and descend four to six stairs 1 HR, 6\" steps with Lan       Dates: Start: 05/03/22         Goal Note filed on 05/04/22 0824 by Karol Carlson PTA       " PT eval performed yesterday                 Problem: Mobility Transfers       Dates: Start: 05/03/22         Goal: STG-Within one week, patient will transfer bed to chair CGA SPT       Dates: Start: 05/03/22         Goal Note filed on 05/04/22 0824 by Karol Carlson PTA       PT garfieldal performed yesterday                 Problem: PT-Long Term Goals       Dates: Start: 05/03/22         Goal: LTG-By discharge, patient will ambulate with FWW x100' indoors SPV       Dates: Start: 05/03/22            Goal: LTG-By discharge, patient will transfer one surface to another Magen w/c<>bed       Dates: Start: 05/03/22            Goal: LTG-By discharge, patient will perform home exercise program with set up       Dates: Start: 05/03/22            Goal: LTG-By discharge, patient will ambulate up/down 4-6 stairs SPV with 1 HR       Dates: Start: 05/03/22

## 2022-05-06 NOTE — PROGRESS NOTES
[facility] Medicine Daily Progress Note    Date of Service  5/6/2022    Chief Complaint:  Hypertension  Leukocytosis  CKD    Interval History:  No complaints.  Doing ok.    Review of Systems  Review of Systems   Constitutional: Negative for chills and fever.   Respiratory: Negative for shortness of breath.    Cardiovascular: Negative for chest pain.   Gastrointestinal: Negative for abdominal pain, diarrhea, nausea and vomiting.   Psychiatric/Behavioral: The patient is not nervous/anxious.         Physical Exam  Temp:  [36.3 °C (97.3 °F)-36.6 °C (97.9 °F)] 36.6 °C (97.9 °F)  Pulse:  [66-73] 73  Resp:  [18-20] 18  BP: (116-169)/(66-86) 117/69  SpO2:  [95 %-97 %] 95 %    Physical Exam  Vitals and nursing note reviewed.   Constitutional:       Appearance: Normal appearance.   HENT:      Head: Atraumatic.   Eyes:      Conjunctiva/sclera: Conjunctivae normal.      Pupils: Pupils are equal, round, and reactive to light.   Neck:      Vascular: No JVD.   Cardiovascular:      Rate and Rhythm: Normal rate and regular rhythm.      Heart sounds: Normal heart sounds.   Pulmonary:      Effort: Pulmonary effort is normal.      Breath sounds: Normal breath sounds.   Abdominal:      General: Bowel sounds are normal.      Palpations: Abdomen is soft.   Musculoskeletal:      Cervical back: Normal range of motion and neck supple.      Right lower leg: No edema.      Left lower leg: No edema.   Skin:     General: Skin is warm and dry.   Neurological:      Mental Status: She is alert and oriented to person, place, and time.   Psychiatric:         Mood and Affect: Mood normal.         Behavior: Behavior normal.         Fluids    Intake/Output Summary (Last 24 hours) at 5/6/2022 0931  Last data filed at 5/5/2022 2018  Gross per 24 hour   Intake 120 ml   Output --   Net 120 ml       Laboratory  Recent Labs     05/05/22  0542   WBC 11.4*   RBC 3.76*   HEMOGLOBIN 11.6*   HEMATOCRIT 37.4   MCV 99.5*   MCH 30.9   MCHC 31.0*   RDW 54.7*    PLATELETCT 279   MPV 10.4     Recent Labs     05/05/22  0542   SODIUM 136   POTASSIUM 4.8   CHLORIDE 104   CO2 21   GLUCOSE 94   BUN 51*   CREATININE 1.29   CALCIUM 8.7                   Imaging    Assessment/Plan  * Ischemic stroke (HCC)- (present on admission)  Assessment & Plan  S/P CVA  Had acute onset of left-sided weakness and slurred speech  MRI showed acute right frontal and parietal watershed strokes  Carotid U/S: negative for any significant stenosis  Echo: showed an EF of 35 to 40% as well as mild pericardial effusion  On ASA & Plavix  On Lipitor    Systolic CHF, chronic (HCC)- (present on admission)  Assessment & Plan  O2 sats good on RA  Echo: showed an EF of 35 to 40% as well as mild pericardial effusion  BNP: 1022 (5/3) -- no other values for comparison  CXR: unremarkable, no edema or effusions    Leukocytosis- (present on admission)  Assessment & Plan  WBC's: 13.3 (5/3) --> 11.4 (5/5)  Has been afebrile  CXR: unremarkable  Urine cultures: NTD -- cont to follow  Monitor    High potassium- (present on admission)  Assessment & Plan  K+: 5.2 --> 4.8 (5/5)  ? 2nd to CKD  ? 2nd to Lisinopril --> d/c'd (last dose 5/3)  Monitor    CKD (chronic kidney disease)- (present on admission)  Assessment & Plan  Reported hx of CKD stage 3  Bun/Cr: 41/1.28 --> 51/1.29  Not sure of baseline  Encouraging fluid intake  S/P gentle IVF's x 1/2 liter  Cont to monitor     Vitamin D deficiency- (present on admission)  Assessment & Plan  Vit D: 15  On supplements    Essential hypertension- (present on admission)  Assessment & Plan  BP ok  Off Lisinopril (last dose 5/3)  Monitor

## 2022-05-06 NOTE — THERAPY
"Occupational Therapy  Daily Treatment     Patient Name: Ashlee Sepulveda  Age:  72 y.o., Sex:  female  Medical Record #: [id number]  Today's Date: 5/6/2022     Precautions  Precautions: (P) Fall Risk  Comments: (P) L hemiparesis; WBAT LLE, hx of falls    Safety   ADL Safety : (P) Requires Supervision for Safety    Subjective    \"They let me know that my leg isn't broken.\"      Objective       05/06/22 1401   Precautions   Precautions Fall Risk   Comments L hemiparesis; WBAT LLE, hx of falls   Safety    ADL Safety  Requires Supervision for Safety   Pain   Pain Scales 0 to 10 Scale    Pain 0 - 10 Group   Location Hip   Location Orientation Left   Pain Rating Scale (NPRS) 4   Description Aching   Comfort Goal Comfort with Movement   Therapist Pain Assessment During Activity   Cognition    Cognition / Consciousness WDL   Orientation Level Oriented x 4   Level of Consciousness Alert   Sleep/Wake Cycle   Sleep & Rest Awake   Active ROM Upper Body   Dominant Hand Right   Lt Shoulder Flexion Degrees 155   Functional Level of Assist   Eating Supervision   Grooming Seated;Supervision   Grooming Description Increased time;Initial preparation for task   Bed, Chair, Wheelchair Transfer Contact Guard Assist   Bed Chair Wheelchair Transfer Description Increased time;Supervision for safety;Verbal cueing   Sitting Upper Body Exercises   Sitting Upper Body Exercises Yes   Chest Fly 3 sets of 15;Bilateral;Weight (See Comments for lbs)  (5#)   Chest Press 3 sets of 15;Bilateral  (15#)   Upper Extremity Bike Level 1 Resistance  (FluidoBike Level1)   Comments Mild active assist LUE   Fine Motor / Dexterity    Fine Motor / Dexterity Yes   Fine Motor / Dexterity Interventions Seated finger isolation activity piano with single hand sequence and bilateral hand sequence with 100% accuracy with 5x10/digit; 50% accuracy with 10 step nonsequential sequence; 75% accuracy with bimanual 1-5 x10 sequence   Bed Mobility    Supine to Sit Standby Assist "   Interdisciplinary Plan of Care Collaboration   IDT Collaboration with  Nursing;Physical Therapist   Patient Position at End of Therapy Seated;Family / Friend in Room;Phone within Reach;Self Releasing Lap Belt Applied   Collaboration Comments CLOF; pt up in chair visiting with family at window; RN aware   OT Total Time Spent   OT Individual Total Time Spent (Mins) 60   OT Charge Group   Charges Yes   OT Neuromuscular Re-education / Balance 2   OT Therapeutic Exercise  2       Assessment    Activities primarily completed from seated position secondary to ongoing hip pain following falling incident yesterday.  Fair finger isolation accuracy noted with L thumb, index, and middle finger with increased effort and decreased keystrike accuracy noted with ring and pinky finger.  With incidental tactile cues, patient completed seated weighted bilateral exercises.  L shoulder AROM ~160 at end of session with scapular support.    Strengths: Able to follow instructions,Effective communication skills,Good insight into deficits/needs,Independent prior level of function,Manages pain appropriately,Motivated for self care and independence,Pleasant and cooperative,Supportive family,Willingly participates in therapeutic activities  Barriers: Decreased endurance,Generalized weakness,Hemiparesis,Home accessibility,Impaired activity tolerance,Impaired balance,Limited mobility (fragile skin)    Plan    Continue OT POC with ongoing strengthening, endurance building, ADL retraining and neuro re-ed.     Passport items to be completed:  [unfilled]    Occupational Therapy Goals (Active)       Problem: Bathing       Dates: Start: 05/03/22         Goal: STG-Within one week, patient will bathe with Lan and use of AE as needed.       Dates: Start: 05/03/22         Goal Note filed on 05/04/22 0649 by Vinicius Kimble C.O.TMIGUEL       Evaluated day prior to this documentation   Limited by decreased balance, decreased left UE function due to   hemiparesis/Decreased attention/ coordination   Continue goal                  Problem: Dressing       Dates: Start: 05/03/22         Goal: STG-Within one week, patient will dress LB with Lan and use of AE as needed.       Dates: Start: 05/03/22         Goal Note filed on 05/04/22 0649 by REJI ReddTLEIGH.       Evaluated day prior to this documentation   Limited by decreased balance, decreased left UE function due to  hemiparesis/Decreased attention/ coordination   Continue goal                  Problem: Functional Transfers       Dates: Start: 05/03/22         Goal: STG-Within one week, patient will transfer to toilet with CGA and use of AE as needed.       Dates: Start: 05/03/22         Goal Note filed on 05/04/22 0649 by GIANNI Redd.T.MARITZA.       Evaluated day prior to this documentation   Limited by decreased balance, decreased left UE function due to  hemiparesis/Decreased attention/ coordination   Continue goal                  Problem: OT Long Term Goals       Dates: Start: 05/03/22         Goal: LTG-By discharge, patient will complete basic self care tasks with supervision to modified independence and use of AE as needed.       Dates: Start: 05/03/22            Goal: LTG-By discharge, patient will perform bathroom transfers with supervision to modified independence and use of AE as needed.       Dates: Start: 05/03/22               Problem: Toileting       Dates: Start: 05/03/22         Goal: STG-Within one week, patient will complete toileting tasks with Lan and use of AE as needed.       Dates: Start: 05/03/22         Goal Note filed on 05/04/22 0649 by FROY Redd.O.T.MARITZA.       Evaluated day prior to this documentation   Limited by decreased balance, decreased left UE function due to  hemiparesis/Decreased attention/ coordination   Continue goal

## 2022-05-06 NOTE — PROGRESS NOTES
PSYCHOLOGICAL CONSULTATION:  Reason for admission: Ischemic stroke (HCC) [I63.9]  Reason for consult: Depressive sxs, adjustment to stroke  Requesting Physician: Carli    Legal status: Legal Status: Voluntary    Chief Complaint: Left hemiparesis    HPI: Ashlee Sepulveda is a 72 y.o. female with a past medical history of coronary artery disease status post stent x3 on aspirin, chronic systolic CHF, COPD, hypertension, hyperlipidemia, severe peripheral artery disease on Eliquis, chronic kidney disease stage III, sleep apnea; now admitted for acute inpatient rehabilitation with severe functional debility after an acute stroke.      Psychology was consulted due to sxs of depressed mood such as crying and anhedonia as well as verbalizations of difficulty with adjusting.    Risk Assessment: current symptoms as reported by pt.  Suicidal Ideation: Denies    Homicidal Ideation: Denies      Psychiatric Review of Systems:current symptoms as reported by pt.  Depression: Denies Reports good mood today, endorses difficulty with adjustment  Meli: Denies   Anxiety/Panic Attacks: Denies   PTSD symptom: Denies   Psychosis: Denies   Other:        Medical Review of Systems: as reported by pt. All systems reviewed. Only those found to be + are noted below. All others are negative.   Neurological:    TBIs: Denies   SZs:Denies   Strokes:Endorses   Other: CKD III  Other medical symptoms:   Thyroid:Denies   Diabetes: Denies   Cardiovascular disease: Endorses    Past Psychiatric Hx: None reported    Family Psychiatric Hx: None reported    Social Hx:   Social History     Socioeconomic History   • Marital status:    Tobacco Use   • Smoking status: Current Every Day Smoker     Packs/day: 1.00     Years: 20.00     Pack years: 20.00     Types: Cigarettes     Last attempt to quit: 2015     Years since quittin.6   • Smokeless tobacco: Never Used   Vaping Use   • Vaping Use: Never used   Substance and Sexual Activity   • Alcohol  use: Yes     Comment: occ - once a month or less   • Drug use: No      Medical Hx: Chart reviewed. Only those findings of potential interest to psychiatry are noted below:  Past Medical History:   Diagnosis Date   • Arthritis    • CHF (congestive heart failure) (HCC)    • COPD (chronic obstructive pulmonary disease) (HCC)    • Coronary artery disease involving native coronary artery of native heart without angina pectoris 2/15/2017   • Dyslipidemia 2/15/2017   • Essential hypertension, benign 2/15/2017   • Hypertension    • Ischemic cardiomyopathy 10/7/2015   • Kidney disease    • Myocardial infarct (HCC)    • Peripheral vascular disease (HCC)    • Rectal cancer (HCC)     10 years ago   • S/P coronary artery stent placement 2/15/2017   • Tobacco use      Medical Conditions:     Allergies: Chlorhexidine  Medications (currently prescribed at [facility]):    Current Facility-Administered Medications:   •  vitamin D3 (cholecalciferol) tablet 1,000 Units, 1,000 Units, Oral, DAILY, Dalton Kent M.D., 1,000 Units at 05/06/22 0805  •  hydrOXYzine HCl (ATARAX) tablet 50 mg, 50 mg, Oral, Q6HRS PRN, Abida Boyce M.D.  •  melatonin tablet 3 mg, 3 mg, Oral, HS PRN, Abida Boyce M.D.  •  Respiratory Therapy Consult, , Nebulization, Continuous RT, Abida Boyce M.D.  •  Pharmacy Consult Request ...Pain Management Review 1 Each, 1 Each, Other, PHARMACY TO DOSE, Abida Boyce M.D.  •  hydrALAZINE (APRESOLINE) tablet 10 mg, 10 mg, Oral, Q8HRS PRN, Abida Boyce M.D.  •  acetaminophen (Tylenol) tablet 650 mg, 650 mg, Oral, Q4HRS PRN, Abida Boyce M.D., 650 mg at 05/06/22 0807  •  senna-docusate (PERICOLACE or SENOKOT S) 8.6-50 MG per tablet 2 Tablet, 2 Tablet, Oral, BID, 2 Tablet at 05/02/22 1952 **AND** polyethylene glycol/lytes (MIRALAX) PACKET 1 Packet, 1 Packet, Oral, QDAY PRN **AND** magnesium hydroxide (MILK OF MAGNESIA) suspension 30 mL, 30 mL, Oral, QDAY PRN **AND** bisacodyl (DULCOLAX)  "suppository 10 mg, 10 mg, Rectal, QDAY PRN, Abida Boyce M.D.  •  lactulose 20 GM/30ML solution 30 mL, 30 mL, Oral, QDAY PRN, Abida Boyce M.D.  •  omeprazole (PRILOSEC) capsule 20 mg, 20 mg, Oral, QAM AC, Abida Boyce M.D., 20 mg at 05/06/22 0804  •  artificial tears ophthalmic solution 1 Drop, 1 Drop, Both Eyes, PRN, Abida Boyce M.D.  •  benzocaine-menthol (Cepacol) lozenge 1 Lozenge, 1 Lozenge, Mouth/Throat, Q2HRS PRN, Abida Boyce M.D.  •  mag hydrox-al hydrox-simeth (MAALOX PLUS ES or MYLANTA DS) suspension 20 mL, 20 mL, Oral, Q2HRS PRN, Abida Boyce M.D.  •  ondansetron (ZOFRAN ODT) dispertab 4 mg, 4 mg, Oral, 4X/DAY PRN **OR** ondansetron (ZOFRAN) syringe/vial injection 4 mg, 4 mg, Intramuscular, 4X/DAY PRN, Abida Boyce M.D.  •  traZODone (DESYREL) tablet 50 mg, 50 mg, Oral, QHS PRN, Abida Boyce M.D.  •  sodium chloride (OCEAN) 0.65 % nasal spray 2 Spray, 2 Spray, Nasal, PRN, Abida Boyce M.D.  •  aspirin EC (ECOTRIN) tablet 81 mg, 81 mg, Oral, DAILY, Abida Boyce M.D., 81 mg at 05/06/22 0804  •  clopidogrel (PLAVIX) tablet 75 mg, 75 mg, Oral, DAILY, Abida Boyce M.D., 75 mg at 05/06/22 0804  •  midazolam (VERSED) 5 mg/mL (1 mL vial), 5 mg, Nasal, PRN, Abida Boyce M.D.  •  atorvastatin (LIPITOR) tablet 40 mg, 40 mg, Oral, Q EVENING, Abida Boyce M.D., 40 mg at 05/05/22 2018  Labs:  No results found for this or any previous visit (from the past 24 hour(s)).       Psychiatric Examination:  Vitals: Blood pressure 117/69, pulse 73, temperature 36.6 °C (97.9 °F), temperature source Oral, resp. rate 18, height 1.676 m (5' 6\"), weight 59 kg (130 lb), SpO2 95 %.  Musculoskeletal: Laying in bed, normal psychomotor activity, no tics or unusual mannerisms noted  Appearance and Eye Contact: Easily established rapport. WDWN, appropriate dress and grooming. Behavior is calm, cooperative,  appropriate eye contact  Attention/Alertness: " "Alert  Thought Process: Linear, Logical and Goal Directed    Thought Content: No psychotic processes noted  Speech: Clear with normal rate and rhythm  Mood: \"Doing okay today\"            Affect: euthymic         SI/HI: Denies    Memory: Recent and remote memory appear intact     Orientation: alert, oriented to person, place and time  Insight into symptoms: within normal limits  Judgement into symptoms:within normal limits    Neuropsychological Testing:   Not formally tested.          ASSESSMENT: Ashlee Sepulveda is a 72 y.o. female with a past medical history of coronary artery disease status post stent x3 on aspirin, chronic systolic CHF, COPD, hypertension, hyperlipidemia, severe peripheral artery disease on Eliquis, chronic kidney disease stage III, sleep apnea; now admitted for acute inpatient rehabilitation with severe functional debility after an acute stroke.      Psychology was consulted due to sxs of depressed mood such as crying and anhedonia as well as verbalizations of difficulty with adjusting. Session focused on assessment as well as psychoeducation regarding the cognitive and emotional impact of stroke. Pt reported that she felt like she was recovering well and in good spirits. Psychology will check in once more to ensure continued good emotional functioning and appropriate engagement with therapies and medical staff.      DSM5 Diagnostic Considerations: Adjustment disorder, unspecified      PLAN:  Records reviewed: Yes  Discussed patient with other provider: Carli  Will continue to follow  Thank you for the consult.    Ana Ortega, Ph.D.   "

## 2022-05-07 LAB
ANION GAP SERPL CALC-SCNC: 10 MMOL/L (ref 7–16)
BUN SERPL-MCNC: 36 MG/DL (ref 8–22)
CALCIUM SERPL-MCNC: 8.5 MG/DL (ref 8.5–10.5)
CHLORIDE SERPL-SCNC: 108 MMOL/L (ref 96–112)
CO2 SERPL-SCNC: 20 MMOL/L (ref 20–33)
CREAT SERPL-MCNC: 0.98 MG/DL (ref 0.5–1.4)
GFR SERPLBLD CREATININE-BSD FMLA CKD-EPI: 61 ML/MIN/1.73 M 2
GLUCOSE SERPL-MCNC: 86 MG/DL (ref 65–99)
MAGNESIUM SERPL-MCNC: 2.3 MG/DL (ref 1.5–2.5)
NT-PROBNP SERPL IA-MCNC: 1038 PG/ML (ref 0–125)
PHOSPHATE SERPL-MCNC: 2.9 MG/DL (ref 2.5–4.5)
POTASSIUM SERPL-SCNC: 5 MMOL/L (ref 3.6–5.5)
SODIUM SERPL-SCNC: 138 MMOL/L (ref 135–145)

## 2022-05-07 PROCEDURE — 80048 BASIC METABOLIC PNL TOTAL CA: CPT

## 2022-05-07 PROCEDURE — 36415 COLL VENOUS BLD VENIPUNCTURE: CPT

## 2022-05-07 PROCEDURE — 99232 SBSQ HOSP IP/OBS MODERATE 35: CPT | Performed by: HOSPITALIST

## 2022-05-07 PROCEDURE — 84100 ASSAY OF PHOSPHORUS: CPT

## 2022-05-07 PROCEDURE — 97110 THERAPEUTIC EXERCISES: CPT

## 2022-05-07 PROCEDURE — 700102 HCHG RX REV CODE 250 W/ 637 OVERRIDE(OP): Performed by: HOSPITALIST

## 2022-05-07 PROCEDURE — 83735 ASSAY OF MAGNESIUM: CPT

## 2022-05-07 PROCEDURE — A9270 NON-COVERED ITEM OR SERVICE: HCPCS | Performed by: PHYSICAL MEDICINE & REHABILITATION

## 2022-05-07 PROCEDURE — 770010 HCHG ROOM/CARE - REHAB SEMI PRIVAT*

## 2022-05-07 PROCEDURE — 97530 THERAPEUTIC ACTIVITIES: CPT | Mod: CQ

## 2022-05-07 PROCEDURE — 97130 THER IVNTJ EA ADDL 15 MIN: CPT | Performed by: SPEECH-LANGUAGE PATHOLOGIST

## 2022-05-07 PROCEDURE — 83880 ASSAY OF NATRIURETIC PEPTIDE: CPT

## 2022-05-07 PROCEDURE — A9270 NON-COVERED ITEM OR SERVICE: HCPCS | Performed by: HOSPITALIST

## 2022-05-07 PROCEDURE — 97129 THER IVNTJ 1ST 15 MIN: CPT | Performed by: SPEECH-LANGUAGE PATHOLOGIST

## 2022-05-07 PROCEDURE — 97110 THERAPEUTIC EXERCISES: CPT | Mod: CQ

## 2022-05-07 PROCEDURE — 700102 HCHG RX REV CODE 250 W/ 637 OVERRIDE(OP): Performed by: PHYSICAL MEDICINE & REHABILITATION

## 2022-05-07 PROCEDURE — 97535 SELF CARE MNGMENT TRAINING: CPT

## 2022-05-07 RX ADMIN — CLOPIDOGREL BISULFATE 75 MG: 75 TABLET ORAL at 09:30

## 2022-05-07 RX ADMIN — ACETAMINOPHEN 650 MG: 325 TABLET ORAL at 19:52

## 2022-05-07 RX ADMIN — Medication 1000 UNITS: at 09:29

## 2022-05-07 RX ADMIN — ASPIRIN 81 MG: 81 TABLET, COATED ORAL at 09:29

## 2022-05-07 RX ADMIN — ATORVASTATIN CALCIUM 40 MG: 40 TABLET, FILM COATED ORAL at 19:52

## 2022-05-07 ASSESSMENT — ENCOUNTER SYMPTOMS
BLURRED VISION: 0
PALPITATIONS: 0
HEADACHES: 0
FEVER: 0
DIZZINESS: 0
VOMITING: 0
NAUSEA: 0
HALLUCINATIONS: 0
SHORTNESS OF BREATH: 0

## 2022-05-07 ASSESSMENT — GAIT ASSESSMENTS
DEVIATION: DECREASED BASE OF SUPPORT;DECREASED HEEL STRIKE;DECREASED TOE OFF
GAIT LEVEL OF ASSIST: CONTACT GUARD ASSIST
ASSISTIVE DEVICE: FRONT WHEEL WALKER

## 2022-05-07 ASSESSMENT — ACTIVITIES OF DAILY LIVING (ADL)
BED_CHAIR_WHEELCHAIR_TRANSFER_DESCRIPTION: INCREASED TIME;SUPERVISION FOR SAFETY;VERBAL CUEING
BED_CHAIR_WHEELCHAIR_TRANSFER_DESCRIPTION: INCREASED TIME;SUPERVISION FOR SAFETY;VERBAL CUEING

## 2022-05-07 ASSESSMENT — PAIN DESCRIPTION - PAIN TYPE: TYPE: ACUTE PAIN

## 2022-05-07 NOTE — CARE PLAN
"  Problem: Fall Risk - Rehab  Goal: Patient will remain free from falls  Outcome: Progressing  Ella Sandoval Fall risk Assessment Score: 12      Moderate fall risk Interventions    - Bed and strip alarm   - Yellow sign by the door   - Yellow wrist band \"Fall risk\"  - Room near to the nurse station  - Do not leave patient unattended in the bathroom  - Fall risk education provided              Problem: Knowledge Deficit - Standard  Goal: Patient and family/care givers will demonstrate understanding of plan of care, disease process/condition, diagnostic tests and medications  Outcome: Progressing   Patient verbalized understanding plan of care.       "

## 2022-05-07 NOTE — THERAPY
Speech Language Pathology  Daily Treatment     Patient Name: Ashlee Sepulveda  Age:  72 y.o., Sex:  female  Medical Record #: [id number]  Today's Date: 5/7/2022     Precautions  Precautions: Fall Risk  Comments: L carolyne    Subjective    Patient was seen for tx, seated in the speech office. She was alert and eager to work with the SLP.      Objective       05/07/22 1331   Precautions   Precautions Fall Risk   Comments L carolyne   Cognition   Medication Management  Supervision (5)   Sleep/Wake Cycle   Sleep & Rest Awake;Out of bed   Interdisciplinary Plan of Care Collaboration   Patient Position at End of Therapy In Bed;Call Light within Reach;Tray Table within Reach   SLP Total Time Spent   SLP Individual Total Time Spent (Mins) 30   Treatment Charges   SLP Cognitive Skill Development First 15 Minutes 1   SLP Cognitive Skill Development Additional 15 Minutes 1       Assessment    SLP provided patient with medication management tasks. Patient independently determined errors in a pictured bill box with 100% accuracy for up to 6 pill. She also used a mock calendar and mock prescription labels to determine the number of pills left in a bottle and the refill date with 100% accuracy and SPV for attention to task.     Strengths: Able to follow instructions,Effective communication skills,Good insight into deficits/needs  Barriers: Impaired functional cognition    Plan    Functional med sort X1 and consider d/c speech services if appropriate.     Passport items to be completed:  [unfilled]    Speech Therapy Problems (Active)       Problem: Memory STGs       Dates: Start: 05/03/22         Goal: STG-Within one week, patient will use external aids and internal memory strategies to recall new information with 90% accuracy and minimal cues       Dates: Start: 05/03/22               Problem: Problem Solving STGs       Dates: Start: 05/03/22         Goal: STG-Within one week, patient will complete medication management tasks with 90%  accuracy and minimal cues       Dates: Start: 05/03/22         Goal Note filed on 05/04/22 1150 by Diamond Salomon MS,CCC-SLP       New goal - Established 5/3/22              Goal: STG-Within one week, patient will complete functional problem solving tasks related to IADLs with 90% accuracy and minimal cues       Dates: Start: 05/03/22         Goal Note filed on 05/04/22 1150 by Diamond Salomon MS,CCC-SLP       New goal - Established 5/3/22                 Problem: Speech/Swallowing LTGs       Dates: Start: 05/03/22         Goal: LTG-By discharge, patient will improve cognitive skills from the mild impairment range to the typical functioning range, as indicated by standardized assessment and functional skill level       Dates: Start: 05/03/22

## 2022-05-07 NOTE — THERAPY
"Occupational Therapy  Daily Treatment     Patient Name: Ashlee Sepulveda  Age:  72 y.o., Sex:  female  Medical Record #: [id number]  Today's Date: 5/7/2022     Precautions  Precautions: (P) Fall Risk  Comments: (P) L hemiparesis, WBAT LLE, hx of falls    Safety   ADL Safety : (P) Requires Supervision for Safety  Bathroom Safety: (P) Requires Supervision for Safety    Subjective    \"I haven't changed yet today. I would like to do that\"     Objective       05/07/22 1001   Precautions   Precautions Fall Risk   Comments L hemiparesis, WBAT LLE, hx of falls   Safety    ADL Safety  Requires Supervision for Safety   Bathroom Safety Requires Supervision for Safety   Pain 0 - 10 Group   Location Hip   Location Orientation Right   Pain Rating Scale (NPRS) 3   Therapist Pain Assessment Prior to Activity   Cognition    Cognition / Consciousness WDL   Sleep/Wake Cycle   Sleep & Rest Awake   Active ROM Upper Body   Active ROM Upper Body  X   Comments RUE WFL, LUE grossly impaired due to weakness   Functional Level of Assist   Upper Body Dressing Minimal Assist  (to pull down in the back)   Upper Body Dressing Description Increased time;Supervision for safety;Verbal cueing   Lower Body Dressing Minimal Assist  (assist to don socks)   Lower Body Dressing Description Assistive devices;Increased time;Supervision for safety;Verbal cueing   Bed, Chair, Wheelchair Transfer Contact Guard Assist  (with FWW)   Bed Chair Wheelchair Transfer Description Increased time;Supervision for safety;Verbal cueing   Comprehension Independent   Expression Independent   Sitting Upper Body Exercises   Chest Press 2 sets of 10;Bilateral;Weight (See Comments for lbs)  (2# dowel AAROM)   Front Arm Raise 2 sets of 10;Bilateral;Weight (See Comments for lbs)  (2# dowel AAROM)   Bicep Curls 1 set of 10;Right ;Left;Weight (See Comments for lbs)  (RUE = 2# dumbbell, LUE = 1# dumbbell)   Upper Extremity Bike Minutes / Rest Breaks (See Comments)  (Moto[employer] 10 mins) "   Comments sitting seated unsupported edge of mat   Balance   Sitting Balance (Static) Good   Sitting Balance (Dynamic) Fair +   Bed Mobility    Sit to Supine Supervised   Scooting Supervised   Rolling Supervised   Interdisciplinary Plan of Care Collaboration   IDT Collaboration with  Physical Therapist Assistant (PTA)   Patient Position at End of Therapy In Bed;Bed Alarm On;Call Light within Reach;Tray Table within Reach;Phone within Reach   Collaboration Comments CLOF   OT Total Time Spent   OT Individual Total Time Spent (Mins) 60   OT Charge Group   OT Self Care / ADL 2   OT Therapeutic Exercise  2       Assessment    Patient agreeable to OT tx on this date. Patient is progressing with her LUE endurance and strength at this time. She completed BUE AAROM exercises on this date while addressing dynamic sitting balance. She required frequent rest breaks d/t fatigue throughout exercise. She was pleasant and cooperative throughout.   Strengths: Able to follow instructions,Effective communication skills,Good insight into deficits/needs,Independent prior level of function,Manages pain appropriately,Motivated for self care and independence,Pleasant and cooperative,Supportive family,Willingly participates in therapeutic activities  Barriers: Decreased endurance,Generalized weakness,Hemiparesis,Home accessibility,Impaired activity tolerance,Impaired balance,Limited mobility (fragile skin)    Plan    Continue OT POC with ongoing strengthening, endurance building, ADL retraining and neuro re-ed.      Passport items to be completed:  [unfilled]    Occupational Therapy Goals (Active)       Problem: Bathing       Dates: Start: 05/03/22         Goal: STG-Within one week, patient will bathe with Lan and use of AE as needed.       Dates: Start: 05/03/22         Goal Note filed on 05/04/22 0649 by Vinicius Kimble C.O.TMIGUEL       Evaluated day prior to this documentation   Limited by decreased balance, decreased left UE function  due to  hemiparesis/Decreased attention/ coordination   Continue goal                  Problem: Dressing       Dates: Start: 05/03/22         Goal: STG-Within one week, patient will dress LB with Lan and use of AE as needed.       Dates: Start: 05/03/22         Goal Note filed on 05/04/22 0649 by REJI ReddTMIGUEL       Evaluated day prior to this documentation   Limited by decreased balance, decreased left UE function due to  hemiparesis/Decreased attention/ coordination   Continue goal                  Problem: Functional Transfers       Dates: Start: 05/03/22         Goal: STG-Within one week, patient will transfer to toilet with CGA and use of AE as needed.       Dates: Start: 05/03/22         Goal Note filed on 05/04/22 0649 by GIANNI Redd.T.MARITZA.       Evaluated day prior to this documentation   Limited by decreased balance, decreased left UE function due to  hemiparesis/Decreased attention/ coordination   Continue goal                  Problem: OT Long Term Goals       Dates: Start: 05/03/22         Goal: LTG-By discharge, patient will complete basic self care tasks with supervision to modified independence and use of AE as needed.       Dates: Start: 05/03/22            Goal: LTG-By discharge, patient will perform bathroom transfers with supervision to modified independence and use of AE as needed.       Dates: Start: 05/03/22               Problem: Toileting       Dates: Start: 05/03/22         Goal: STG-Within one week, patient will complete toileting tasks with Lan and use of AE as needed.       Dates: Start: 05/03/22         Goal Note filed on 05/04/22 0649 by FROY Redd.O.T.MARITZA.       Evaluated day prior to this documentation   Limited by decreased balance, decreased left UE function due to  hemiparesis/Decreased attention/ coordination   Continue goal

## 2022-05-07 NOTE — PROGRESS NOTES
[facility] Medicine Daily Progress Note    Date of Service  5/7/2022    Chief Complaint:  Hypertension  Leukocytosis  CKD    Interval History:  Discussed that her dehydration is much better.  Will d/c IV access    Review of Systems  Review of Systems   Constitutional: Negative for fever.   Eyes: Negative for blurred vision.   Respiratory: Negative for shortness of breath.    Cardiovascular: Negative for palpitations.   Gastrointestinal: Negative for nausea and vomiting.   Neurological: Negative for dizziness and headaches.   Psychiatric/Behavioral: Negative for hallucinations.        Physical Exam  Temp:  [36.4 °C (97.5 °F)-36.7 °C (98 °F)] 36.4 °C (97.5 °F)  Pulse:  [69-70] 69  Resp:  [16] 16  BP: (119-125)/(59-75) 119/75  SpO2:  [95 %-98 %] 98 %    Physical Exam  Vitals and nursing note reviewed.   Constitutional:       General: She is not in acute distress.  HENT:      Mouth/Throat:      Pharynx: Oropharynx is clear.   Eyes:      General: No scleral icterus.  Neck:      Vascular: No JVD.   Cardiovascular:      Rate and Rhythm: Normal rate and regular rhythm.      Heart sounds: Normal heart sounds.   Pulmonary:      Effort: Pulmonary effort is normal.      Breath sounds: No wheezing or rales.   Abdominal:      General: Bowel sounds are normal.      Palpations: Abdomen is soft.   Musculoskeletal:      Cervical back: No rigidity.      Right lower leg: No edema.      Left lower leg: No edema.   Skin:     General: Skin is warm and dry.   Neurological:      Mental Status: She is alert and oriented to person, place, and time.   Psychiatric:         Mood and Affect: Mood normal.         Behavior: Behavior normal.         Fluids    Intake/Output Summary (Last 24 hours) at 5/7/2022 0926  Last data filed at 5/6/2022 1700  Gross per 24 hour   Intake 840 ml   Output --   Net 840 ml       Laboratory  Recent Labs     05/05/22  0542   WBC 11.4*   RBC 3.76*   HEMOGLOBIN 11.6*   HEMATOCRIT 37.4   MCV 99.5*   MCH 30.9   MCHC 31.0*    RDW 54.7*   PLATELETCT 279   MPV 10.4     Recent Labs     05/05/22  0542   SODIUM 136   POTASSIUM 4.8   CHLORIDE 104   CO2 21   GLUCOSE 94   BUN 51*   CREATININE 1.29   CALCIUM 8.7                   Imaging    Assessment/Plan  * Ischemic stroke (HCC)- (present on admission)  Assessment & Plan  S/P CVA  Had acute onset of left-sided weakness and slurred speech  MRI showed acute right frontal and parietal watershed strokes  Carotid U/S: negative for any significant stenosis  Echo: showed an EF of 35 to 40% as well as mild pericardial effusion  On ASA & Plavix  On Lipitor    Systolic CHF, chronic (HCC)- (present on admission)  Assessment & Plan  O2 sats good on RA  Echo: showed an EF of 35 to 40% as well as mild pericardial effusion  BNP: 1022 (5/3) -- no other values for comparison  CXR: unremarkable, no edema or effusions    Leukocytosis- (present on admission)  Assessment & Plan  WBC's: 13.3 (5/3) --> 11.4 (5/5)   Has been afebrile  CXR: unremarkable  Urine cultures: neg  Monitor    High potassium- (present on admission)  Assessment & Plan  K+: 5.2 --> 4.8 (5/5) --> 5.0 (5/7)  ? 2nd to CKD  ? 2nd to Lisinopril --> d/c'd (last dose 5/3)  Will place on a low K+ diet  Monitor    CKD (chronic kidney disease)- (present on admission)  Assessment & Plan  Reported hx of CKD stage 3  Bun/Cr: 41/1.28 --> 51/1.29 --> 36/0.98 (5/7)  Not sure of baseline  Encouraging fluid intake  S/P gentle IVF's x 1/2 liter  Cont to monitor     Vitamin D deficiency- (present on admission)  Assessment & Plan  Vit D: 15  On supplements    Essential hypertension- (present on admission)  Assessment & Plan  BP ok  Off Lisinopril (last dose 5/3)  Monitor

## 2022-05-07 NOTE — THERAPY
Physical Therapy   Daily Treatment     Patient Name: Ashlee Sepulveda  Age:  72 y.o., Sex:  female  Medical Record #: [id number]  Today's Date: 5/7/2022     Precautions  Precautions: Fall Risk  Comments: L hemiparesis; WBAT LLE, hx of falls    Subjective    Pt was lying in bed upon arrival, agreeable to session.     Objective       05/07/22 0831   Pain 0 - 10 Group   Location Hip   Pain Rating Scale (NPRS) 3   Description Aching   Therapist Pain Assessment Post Activity Pain Same as Prior to Activity   Cognition    Level of Consciousness Alert   Gait Functional Level of Assist    Gait Level Of Assist Contact Guard Assist   Assistive Device Front Wheel Walker   Distance (Feet)   (125x2, 80x1, 180x1)   Deviation Decreased Base Of Support;Decreased Heel Strike;Decreased Toe Off  (cues for continious gait)   Stairs Functional Level of Assist   Level of Assist with Stairs Minimal Assist  (CGA w/ BHR, Lan w/ RHR)   # of Stairs Climbed 12  (6 w/ BHRs, 6 w/ RHR)   Stairs Description Extra time;Hand rails;Supervision for safety;Verbal cueing   Transfer Functional Level of Assist   Bed, Chair, Wheelchair Transfer Contact Guard Assist  (to SBA)   Bed Chair Wheelchair Transfer Description Increased time;Supervision for safety;Verbal cueing   Bed Mobility    Supine to Sit Supervised   Sit to Supine Supervised   Sit to Stand Standby Assist   Scooting Supervised   Rolling Supervised   Interdisciplinary Plan of Care Collaboration   IDT Collaboration with  Occupational Therapist   Patient Position at End of Therapy Seated;Call Light within Reach;Tray Table within Reach;Phone within Reach   Collaboration Comments CLOF   PT Total Time Spent   PT Individual Total Time Spent (Mins) 60   PT Charge Group   PT Therapeutic Exercise 2   PT Therapeutic Activities 2   Supervising Physical Therapist Sigrid Sanders     Bed mob assessed at ADL bedroom on queen size standard bed.    Assessment    Pt tolerated session well, much improved today comparing  "to yesterday and pt stated it was a relief that theres no fx. From the fall 2 days ago. Improved in gait endurance w/ CGA/SBA and trial stairs w/ RHR only as home environment. Required Lan during descending w/ RHR only.    Strengths: Able to follow instructions,Adequate strength,Good insight into deficits/needs,Independent prior level of function,Good carryover of learning,Motivated for self care and independence,Pleasant and cooperative,Supportive family,Willingly participates in therapeutic activities  Barriers: Decreased endurance,Hemiparesis,Impaired balance    Plan    Ambulation w/ FWW, STS from varying surface and work on hand placement, standing balance, general strengthening and endurance, bed mobility.     Passport items to be completed:  Get in/out of bed safely, in/out of a vehicle, safely use mobility device, walk or wheel around home/community, navigate up and down stairs, show how to get up/down from the ground, ensure home is accessible, demonstrate HEP, complete caregiver training    Physical Therapy Problems (Active)       Problem: Balance       Dates: Start: 05/03/22         Goal: STG-Within one week, patient will maintain dynamic standing perform Avila       Dates: Start: 05/03/22         Goal Note filed on 05/04/22 0824 by Karol Carlson PTA       PT eval performed yesterday                 Problem: Mobility       Dates: Start: 05/03/22         Goal: STG-Within one week, patient will ambulate household distance with FWW x150 CGA with improved posture/L knee stability       Dates: Start: 05/03/22         Goal Note filed on 05/04/22 0824 by Karol Carlson PTA       PT eval performed yesterday              Goal: STG-Within one week, patient will ascend and descend four to six stairs 1 HR, 6\" steps with Lan       Dates: Start: 05/03/22         Goal Note filed on 05/04/22 0824 by Karol Carlson PTA       PT eval performed yesterday                 Problem: Mobility Transfers       Dates: Start: 05/03/22 "         Goal: STG-Within one week, patient will transfer bed to chair CGA SPT       Dates: Start: 05/03/22         Goal Note filed on 05/04/22 0824 by Karol Carlson PTA       PT garfieldal performed yesterday                 Problem: PT-Long Term Goals       Dates: Start: 05/03/22         Goal: LTG-By discharge, patient will ambulate with FWW x100' indoors SPV       Dates: Start: 05/03/22            Goal: LTG-By discharge, patient will transfer one surface to another Magen w/c<>bed       Dates: Start: 05/03/22            Goal: LTG-By discharge, patient will perform home exercise program with set up       Dates: Start: 05/03/22            Goal: LTG-By discharge, patient will ambulate up/down 4-6 stairs SPV with 1 HR       Dates: Start: 05/03/22

## 2022-05-07 NOTE — CARE PLAN
"The patient is Stable - Low risk of patient condition declining or worsening    Shift Goals  Clinical Goals: pain management  Patient Goals: Increase strength      Problem: Fall Risk - Rehab  Goal: Patient will remain free from falls  Outcome: Progressing: Pt uses call light consistently and appropriately. Waits for assistance does not attempt self transfer this shift. Able to verbalize needs. Pt reminded to ring the call light if she needs to get up. Pt said, \"I will make sure to call, I'm not going to fall again.\"     Problem: Pain - Standard  Goal: Alleviation of pain or a reduction in pain to the patient’s comfort goal  Outcome: Progressing: Pt requested PRN Tylenol for right hip pain 4/10. Pt explained, \"It just feels like I lisa this whole side of my body when I fell in the bathroom the other day, but most of the pain is in my hip.\" Pt informed to ring the call light anytime during the night if he needs any more pain medication. Pt stated she understood.            "

## 2022-05-08 PROCEDURE — 97530 THERAPEUTIC ACTIVITIES: CPT

## 2022-05-08 PROCEDURE — 99232 SBSQ HOSP IP/OBS MODERATE 35: CPT | Performed by: HOSPITALIST

## 2022-05-08 PROCEDURE — A9270 NON-COVERED ITEM OR SERVICE: HCPCS | Performed by: HOSPITALIST

## 2022-05-08 PROCEDURE — 770010 HCHG ROOM/CARE - REHAB SEMI PRIVAT*

## 2022-05-08 PROCEDURE — 97116 GAIT TRAINING THERAPY: CPT

## 2022-05-08 PROCEDURE — 97110 THERAPEUTIC EXERCISES: CPT

## 2022-05-08 PROCEDURE — 97129 THER IVNTJ 1ST 15 MIN: CPT

## 2022-05-08 PROCEDURE — A9270 NON-COVERED ITEM OR SERVICE: HCPCS | Performed by: PHYSICAL MEDICINE & REHABILITATION

## 2022-05-08 PROCEDURE — 97112 NEUROMUSCULAR REEDUCATION: CPT

## 2022-05-08 PROCEDURE — 97130 THER IVNTJ EA ADDL 15 MIN: CPT

## 2022-05-08 PROCEDURE — 700102 HCHG RX REV CODE 250 W/ 637 OVERRIDE(OP): Performed by: HOSPITALIST

## 2022-05-08 PROCEDURE — 700102 HCHG RX REV CODE 250 W/ 637 OVERRIDE(OP): Performed by: PHYSICAL MEDICINE & REHABILITATION

## 2022-05-08 RX ADMIN — ASPIRIN 81 MG: 81 TABLET, COATED ORAL at 08:09

## 2022-05-08 RX ADMIN — ATORVASTATIN CALCIUM 40 MG: 40 TABLET, FILM COATED ORAL at 19:55

## 2022-05-08 RX ADMIN — Medication 1000 UNITS: at 08:09

## 2022-05-08 RX ADMIN — ACETAMINOPHEN 650 MG: 325 TABLET ORAL at 11:43

## 2022-05-08 RX ADMIN — CLOPIDOGREL BISULFATE 75 MG: 75 TABLET ORAL at 08:09

## 2022-05-08 RX ADMIN — ACETAMINOPHEN 650 MG: 325 TABLET ORAL at 19:55

## 2022-05-08 ASSESSMENT — ENCOUNTER SYMPTOMS
DIZZINESS: 0
NERVOUS/ANXIOUS: 0
DIARRHEA: 0
COUGH: 0
BLURRED VISION: 0
FEVER: 0

## 2022-05-08 ASSESSMENT — GAIT ASSESSMENTS
DISTANCE (FEET): 125
ASSISTIVE DEVICE: FRONT WHEEL WALKER
GAIT LEVEL OF ASSIST: CONTACT GUARD ASSIST
DEVIATION: ANTALGIC;STEP TO;DECREASED HEEL STRIKE;DECREASED TOE OFF

## 2022-05-08 ASSESSMENT — FIBROSIS 4 INDEX: FIB4 SCORE: 0.43

## 2022-05-08 ASSESSMENT — ACTIVITIES OF DAILY LIVING (ADL): BED_CHAIR_WHEELCHAIR_TRANSFER_DESCRIPTION: INCREASED TIME;ADAPTIVE EQUIPMENT;VERBAL CUEING;SUPERVISION FOR SAFETY

## 2022-05-08 ASSESSMENT — PAIN DESCRIPTION - PAIN TYPE: TYPE: ACUTE PAIN

## 2022-05-08 NOTE — CARE PLAN
"  Problem: Knowledge Deficit - Standard  Goal: Patient and family/care givers will demonstrate understanding of plan of care, disease process/condition, diagnostic tests and medications  Outcome: Progressing  Patient verbalized understanding plan of care.          Problem: Fall Risk - Rehab  Goal: Patient will remain free from falls  Outcome: Progressing  Ella Sandoval Fall risk Assessment Score: 12       Moderate fall risk Interventions  - Bed and strip alarm   - Yellow sign by the door   - Yellow wrist band \"Fall risk\"  - Room near to the nurse station  - Do not leave patient unattended in the bathroom  - Fall risk education provided                     "

## 2022-05-08 NOTE — THERAPY
Speech Language Pathology  Daily Treatment     Patient Name: Ashlee Sepulveda  Age:  72 y.o., Sex:  female  Medical Record #: [id number]  Today's Date: 5/8/2022     Precautions  Precautions: Fall Risk  Comments: L carolyne    Subjective    Pt seen sitting up in w/c in ST office; pleasant and cooperative during ST     Objective       05/08/22 1331   Speech Language Pathologist Assigned   Assigned SLP / Extension D/c ST   SLP Total Time Spent   SLP Individual Total Time Spent (Mins) 30   Treatment Charges   SLP Cognitive Skill Development First 15 Minutes 1   SLP Cognitive Skill Development Additional 15 Minutes 1       Assessment    Pt participated in functional medication sort with current medications indep with 100% accuracy. Pt demonstrated good insight and recall of d/c recommendations. Reviewed recommendations to implement use of weekly pill box for safety, organization, accuracy, and as a visual reminder- pt verbalized understanding and agreement. Reviewed current POC and recommendation for d/c from ST- pt verbalized agreement.     Strengths: Able to follow instructions,Effective communication skills,Good insight into deficits/needs  Barriers: Impaired functional cognition    Plan    D/c from  this date    Speech Therapy Problems (Active)       There are no active problems.

## 2022-05-08 NOTE — THERAPY
Physical Therapy   Daily Treatment     Patient Name: Ashlee Sepulveda  Age:  72 y.o., Sex:  female  Medical Record #: [id number]  Today's Date: 5/8/2022     Precautions  Precautions: (P) Fall Risk  Comments: L carolyne    Subjective    Patient motivated and agreeable to participate. Reports increased L hip pain this morning, says that she did not take any tylenol this morning.     Objective       05/08/22 1001   Precautions   Precautions Fall Risk   Gait Functional Level of Assist    Gait Level Of Assist Contact Guard Assist   Assistive Device Front Wheel Walker   Distance (Feet) 125   # of Times Distance was Traveled 2   Deviation Antalgic;Step To;Decreased Heel Strike;Decreased Toe Off  (Step-to initally due to L hip pain)   Transfer Functional Level of Assist   Bed, Chair, Wheelchair Transfer Contact Guard Assist   Bed Chair Wheelchair Transfer Description Increased time;Adaptive equipment;Verbal cueing;Supervision for safety  (Stand pivot with FWW)   Sitting Lower Body Exercises   Ankle Pumps 3 sets of 15;Bilateral   Hip Abduction 3 sets of 10;Medium Resistance Theraband   Long Arc Quad 3 sets of 10;Bilateral   Marching 3 sets of 10   Neuro-Muscular Treatments   Comments   Sit <> stand from mat table with tactile cuing at hips for equal weight-bearing in LE 5 x 5 with immediate standing balance unsupported (FWW in front of patient for safety; cuing to not use back of LE on mat table for stability)    Gait training with agility ladder to increase L foot clearance (using FWW) 4 x 10' with CGA     PT Total Time Spent   PT Individual Total Time Spent (Mins) 60   PT Charge Group   PT Gait Training 1   PT Therapeutic Exercise 1   PT Neuromuscular Re-Education / Balance 1   PT Therapeutic Activities 1       Assessment    Patient with increased hip pain at beginning of session with significant antalgic gait pattern and decreased L foot clearance. Improved throughout session and patient showed improved upright posture and foot  "clearance by the end. She still needs some cuing for appropriate hand placement and sequencing for sit <> stand (from the wheelchair).     Strengths: Able to follow instructions,Adequate strength,Good insight into deficits/needs,Independent prior level of function,Good carryover of learning,Motivated for self care and independence,Pleasant and cooperative,Supportive family,Willingly participates in therapeutic activities  Barriers: Decreased endurance,Hemiparesis,Impaired balance    Plan      Ambulation w/ FWW, STS from varying surface and work on hand placement, standing balance, general strengthening and endurance, bed mobility.     Passport items to be completed:  Get in/out of bed safely, in/out of a vehicle, safely use mobility device, walk or wheel around home/community, navigate up and down stairs, show how to get up/down from the ground, ensure home is accessible, demonstrate HEP, complete caregiver training    Physical Therapy Problems (Active)       Problem: Balance       Dates: Start: 05/03/22         Goal: STG-Within one week, patient will maintain dynamic standing perform Avila       Dates: Start: 05/03/22         Goal Note filed on 05/04/22 0824 by Karol Carlson PTA       PT eval performed yesterday                 Problem: Mobility       Dates: Start: 05/03/22         Goal: STG-Within one week, patient will ambulate household distance with FWW x150 CGA with improved posture/L knee stability       Dates: Start: 05/03/22         Goal Note filed on 05/04/22 0824 by Karol Carlson PTA       PT eval performed yesterday              Goal: STG-Within one week, patient will ascend and descend four to six stairs 1 HR, 6\" steps with Lan       Dates: Start: 05/03/22         Goal Note filed on 05/04/22 0824 by Karol Carlson PTA       PT eval performed yesterday                 Problem: Mobility Transfers       Dates: Start: 05/03/22         Goal: STG-Within one week, patient will transfer bed to chair CGA SPT  "      Dates: Start: 05/03/22         Goal Note filed on 05/04/22 0824 by Karol Carlson, MURALI       PT eval performed yesterday                 Problem: PT-Long Term Goals       Dates: Start: 05/03/22         Goal: LTG-By discharge, patient will ambulate with FWW x100' indoors SPV       Dates: Start: 05/03/22            Goal: LTG-By discharge, patient will transfer one surface to another Magen w/c<>bed       Dates: Start: 05/03/22            Goal: LTG-By discharge, patient will perform home exercise program with set up       Dates: Start: 05/03/22            Goal: LTG-By discharge, patient will ambulate up/down 4-6 stairs SPV with 1 HR       Dates: Start: 05/03/22

## 2022-05-08 NOTE — CARE PLAN
The patient is Stable - Low risk of patient condition declining or worsening    Shift Goals  Clinical Goals: safety  Patient Goals: Hydration      Problem: Fall Risk - Rehab  Goal: Patient will remain free from falls  Outcome: Progressing: Pt uses call light consistently and appropriately. Waits for assistance does not attempt self transfer this shift. Able to verbalize needs. Strip alarm is on.      Problem: Pain - Standard  Goal: Alleviation of pain or a reduction in pain to the patient’s comfort goal  Outcome: Progressing: Pt requested PRN Tylenol at bedtime for 3/10 right hip pain. Pt informed to ring the call light anytime during the night if she needs any more pain medication. Pt stated she understood.

## 2022-05-08 NOTE — PROGRESS NOTES
[facility] Medicine Daily Progress Note    Date of Service  5/8/2022    Chief Complaint:  Hypertension  Leukocytosis  CKD    Interval History:  No significant events overnight.    Review of Systems  Review of Systems   Constitutional: Negative for fever.   Eyes: Negative for blurred vision.   Respiratory: Negative for cough.    Cardiovascular: Negative for chest pain.   Gastrointestinal: Negative for diarrhea.   Musculoskeletal: Negative for joint pain.   Neurological: Negative for dizziness.   Psychiatric/Behavioral: The patient is not nervous/anxious.         Physical Exam  Temp:  [36.5 °C (97.7 °F)-36.7 °C (98 °F)] 36.7 °C (98 °F)  Pulse:  [70-72] 72  Resp:  [16-17] 16  BP: (121-151)/(57-83) 137/77  SpO2:  [95 %-97 %] 95 %    Physical Exam  Vitals and nursing note reviewed.   Constitutional:       Appearance: She is not diaphoretic.   HENT:      Mouth/Throat:      Pharynx: No oropharyngeal exudate or posterior oropharyngeal erythema.   Eyes:      Extraocular Movements: Extraocular movements intact.   Neck:      Vascular: No carotid bruit or JVD.   Cardiovascular:      Rate and Rhythm: Normal rate and regular rhythm.      Heart sounds: Normal heart sounds.   Pulmonary:      Effort: Pulmonary effort is normal.      Breath sounds: No wheezing or rales.   Abdominal:      General: There is no distension.      Palpations: Abdomen is soft.      Tenderness: There is no abdominal tenderness.   Musculoskeletal:      Right lower leg: No edema.      Left lower leg: No edema.   Skin:     General: Skin is warm and dry.   Neurological:      Mental Status: She is alert and oriented to person, place, and time.   Psychiatric:         Mood and Affect: Mood normal.         Behavior: Behavior normal.         Fluids    Intake/Output Summary (Last 24 hours) at 5/8/2022 0922  Last data filed at 5/7/2022 1800  Gross per 24 hour   Intake 360 ml   Output --   Net 360 ml       Laboratory      Recent Labs     05/07/22  0745   SODIUM 138    POTASSIUM 5.0   CHLORIDE 108   CO2 20   GLUCOSE 86   BUN 36*   CREATININE 0.98   CALCIUM 8.5                   Imaging    Assessment/Plan  * Ischemic stroke (HCC)- (present on admission)  Assessment & Plan  S/P CVA  Had acute onset of left-sided weakness and slurred speech  MRI showed acute right frontal and parietal watershed strokes  Carotid U/S: negative for any significant stenosis  Echo: showed an EF of 35 to 40% as well as mild pericardial effusion  On ASA & Plavix  On Lipitor    Systolic CHF, chronic (HCC)- (present on admission)  Assessment & Plan  O2 sats good on RA  Echo: showed an EF of 35 to 40% as well as mild pericardial effusion  BNP: 1022 (5/3) --> 1038 (5/7)  CXR: unremarkable, no edema or effusions    Leukocytosis- (present on admission)  Assessment & Plan  WBC's: 13.3 (5/3) --> 11.4 (5/5)   Has been afebrile  CXR: unremarkable  Urine cultures: neg  Monitor    High potassium- (present on admission)  Assessment & Plan  K+: 5.2 --> 4.8 (5/5) --> 5.0 (5/7)  Now on a low K+ diet  ? 2nd to CKD  ? 2nd to Lisinopril --> d/c'd (last dose 5/3)  Monitor    CKD (chronic kidney disease)- (present on admission)  Assessment & Plan  Reported hx of CKD stage 3  Bun/Cr: 41/1.28 --> 51/1.29 --> 36/0.98 (5/7)  Not sure of baseline  Encouraging fluid intake  S/P gentle IVF's x 1/2 liter  Cont to monitor     Vitamin D deficiency- (present on admission)  Assessment & Plan  Vit D: 15  On supplements    Essential hypertension- (present on admission)  Assessment & Plan  BP ok  Off Lisinopril  Currently not on any hypertensive meds  Monitor

## 2022-05-09 ENCOUNTER — APPOINTMENT (OUTPATIENT)
Dept: RADIOLOGY | Facility: REHABILITATION | Age: 72
DRG: 057 | End: 2022-05-09
Attending: PHYSICAL MEDICINE & REHABILITATION
Payer: MEDICARE

## 2022-05-09 PROCEDURE — 97110 THERAPEUTIC EXERCISES: CPT

## 2022-05-09 PROCEDURE — 97535 SELF CARE MNGMENT TRAINING: CPT

## 2022-05-09 PROCEDURE — 97116 GAIT TRAINING THERAPY: CPT

## 2022-05-09 PROCEDURE — 700101 HCHG RX REV CODE 250: Performed by: PHYSICAL MEDICINE & REHABILITATION

## 2022-05-09 PROCEDURE — 700102 HCHG RX REV CODE 250 W/ 637 OVERRIDE(OP): Performed by: PHYSICAL MEDICINE & REHABILITATION

## 2022-05-09 PROCEDURE — 99233 SBSQ HOSP IP/OBS HIGH 50: CPT | Performed by: PHYSICAL MEDICINE & REHABILITATION

## 2022-05-09 PROCEDURE — A9270 NON-COVERED ITEM OR SERVICE: HCPCS | Performed by: HOSPITALIST

## 2022-05-09 PROCEDURE — 770010 HCHG ROOM/CARE - REHAB SEMI PRIVAT*

## 2022-05-09 PROCEDURE — A9270 NON-COVERED ITEM OR SERVICE: HCPCS | Performed by: PHYSICAL MEDICINE & REHABILITATION

## 2022-05-09 PROCEDURE — 97112 NEUROMUSCULAR REEDUCATION: CPT

## 2022-05-09 PROCEDURE — 700102 HCHG RX REV CODE 250 W/ 637 OVERRIDE(OP): Performed by: HOSPITALIST

## 2022-05-09 PROCEDURE — 97530 THERAPEUTIC ACTIVITIES: CPT

## 2022-05-09 PROCEDURE — 99232 SBSQ HOSP IP/OBS MODERATE 35: CPT | Performed by: HOSPITALIST

## 2022-05-09 PROCEDURE — 72220 X-RAY EXAM SACRUM TAILBONE: CPT

## 2022-05-09 RX ADMIN — DICLOFENAC SODIUM 2 G: 10 GEL TOPICAL at 20:36

## 2022-05-09 RX ADMIN — OMEPRAZOLE 20 MG: 20 CAPSULE, DELAYED RELEASE ORAL at 08:57

## 2022-05-09 RX ADMIN — CLOPIDOGREL BISULFATE 75 MG: 75 TABLET ORAL at 08:58

## 2022-05-09 RX ADMIN — ACETAMINOPHEN 650 MG: 325 TABLET ORAL at 13:27

## 2022-05-09 RX ADMIN — ATORVASTATIN CALCIUM 40 MG: 40 TABLET, FILM COATED ORAL at 20:36

## 2022-05-09 RX ADMIN — ASPIRIN 81 MG: 81 TABLET, COATED ORAL at 08:57

## 2022-05-09 RX ADMIN — DICLOFENAC SODIUM 2 G: 10 GEL TOPICAL at 10:00

## 2022-05-09 RX ADMIN — ACETAMINOPHEN 650 MG: 325 TABLET ORAL at 08:58

## 2022-05-09 RX ADMIN — Medication 1000 UNITS: at 08:57

## 2022-05-09 ASSESSMENT — ACTIVITIES OF DAILY LIVING (ADL)
TUB_SHOWER_TRANSFER_DESCRIPTION: GRAB BAR;SHOWER BENCH;SET-UP OF EQUIPMENT;SUPERVISION FOR SAFETY
BED_CHAIR_WHEELCHAIR_TRANSFER_DESCRIPTION: SET-UP OF EQUIPMENT;SUPERVISION FOR SAFETY
TOILET_TRANSFER_DESCRIPTION: ADAPTIVE EQUIPMENT;GRAB BAR;INCREASED TIME;INITIAL PREPARATION FOR TASK;SET-UP OF EQUIPMENT;SUPERVISION FOR SAFETY;VERBAL CUEING
TOILETING_LEVEL_OF_ASSIST_DESCRIPTION: SET-UP OF EQUIPMENT;SUPERVISION FOR SAFETY
BED_CHAIR_WHEELCHAIR_TRANSFER_DESCRIPTION: ADAPTIVE EQUIPMENT;INCREASED TIME;SET-UP OF EQUIPMENT;VERBAL CUEING
TOILET_TRANSFER_DESCRIPTION: GRAB BAR;SET-UP OF EQUIPMENT;SUPERVISION FOR SAFETY
BED_CHAIR_WHEELCHAIR_TRANSFER_DESCRIPTION: ADAPTIVE EQUIPMENT;INCREASED TIME;INITIAL PREPARATION FOR TASK;SET-UP OF EQUIPMENT;SUPERVISION FOR SAFETY;VERBAL CUEING

## 2022-05-09 ASSESSMENT — PAIN DESCRIPTION - PAIN TYPE
TYPE: ACUTE PAIN
TYPE: ACUTE PAIN
TYPE: ACUTE PAIN;CHRONIC PAIN

## 2022-05-09 ASSESSMENT — ENCOUNTER SYMPTOMS
VOMITING: 0
NAUSEA: 0
NERVOUS/ANXIOUS: 0
FEVER: 0
SHORTNESS OF BREATH: 0
DIARRHEA: 0
CHILLS: 0
ABDOMINAL PAIN: 0

## 2022-05-09 ASSESSMENT — GAIT ASSESSMENTS
GAIT LEVEL OF ASSIST: CONTACT GUARD ASSIST
DISTANCE (FEET): 120
GAIT LEVEL OF ASSIST: CONTACT GUARD ASSIST
DISTANCE (FEET): 100
DEVIATION: ANTALGIC;BRADYKINETIC;DECREASED HEEL STRIKE;DECREASED TOE OFF
ASSISTIVE DEVICE: FRONT WHEEL WALKER
ASSISTIVE DEVICE: FRONT WHEEL WALKER
DEVIATION: ANTALGIC;BRADYKINETIC;DECREASED HEEL STRIKE;DECREASED TOE OFF

## 2022-05-09 NOTE — THERAPY
Physical Therapy   Daily Treatment     Patient Name: Ashlee Sepulveda  Age:  72 y.o., Sex:  female  Medical Record #: [id number]  Today's Date: 5/9/2022     Precautions  Precautions: Fall Risk  Comments: L carolyne    Subjective    Pt resting in bed, agreeable to PT session.     Objective     05/09/22 0831   Pain 0 - 10 Group   Location Hip   Location Orientation Left   Therapist Pain Assessment During Activity   Gait Functional Level of Assist    Gait Level Of Assist Contact Guard Assist   Assistive Device Front Wheel Walker   Distance (Feet) 120   # of Times Distance was Traveled 1   Deviation Antalgic;Bradykinetic;Decreased Heel Strike;Decreased Toe Off  (LLE decreased foot clearance during swing)   Transfer Functional Level of Assist   Bed, Chair, Wheelchair Transfer Contact Guard Assist   Bed Chair Wheelchair Transfer Description Adaptive equipment;Increased time;Initial preparation for task;Set-up of equipment;Supervision for safety;Verbal cueing   Toilet Transfers Contact Guard Assist   Toilet Transfer Description Adaptive equipment;Grab bar;Increased time;Initial preparation for task;Set-up of equipment;Supervision for safety;Verbal cueing   Supine Lower Body Exercise   Other Exercises shuttle double leg 1x10 reps  (pt reported that the shuttle mat was too painful for L hip, activity was ceased)   Sitting Lower Body Exercises   Other Exercises motomed for CV endurance training/ LLE forced use x10 minutes   Bed Mobility    Supine to Sit Supervised   Sit to Supine   (N/a, pt in wc at end of the session)   Sit to Stand Contact Guard Assist   Interdisciplinary Plan of Care Collaboration   IDT Collaboration with  Occupational Therapist   Patient Position at End of Therapy Seated;Chair Alarm On   Collaboration Comments handoff to OT completed in main gym     Pt became emotional after 1x10 reps on the shuttle. When asked what was wrong, pt reported that her hip was too painful to tolerate lying on the shuttle mat and the  "activity was ceased. Pt with no c/o pain for the remainder of the session.    Assessment    Pt with increased pain in her L hip from her fall in the bathroom last week. Pt was unable to tolerate shuttle d/t pain but was able to perform gait and endurance training with no c/o pain.     Strengths: Able to follow instructions,Adequate strength,Good insight into deficits/needs,Independent prior level of function,Good carryover of learning,Motivated for self care and independence,Pleasant and cooperative,Supportive family,Willingly participates in therapeutic activities  Barriers: Decreased endurance,Hemiparesis,Impaired balance    Plan    Ambulation w/ FWW, STS from varying surface and work on hand placement, standing balance, general strengthening and endurance, bed mobility.     Passport items to be completed:  Get in/out of bed safely, in/out of a vehicle, safely use mobility device, walk or wheel around home/community, navigate up and down stairs, show how to get up/down from the ground, ensure home is accessible, demonstrate HEP, complete caregiver training    Physical Therapy Problems (Active)       Problem: Balance       Dates: Start: 05/03/22         Goal: STG-Within one week, patient will maintain dynamic standing perform Avila       Dates: Start: 05/03/22         Goal Note filed on 05/04/22 0824 by Karol Carlson PTA       PT eval performed yesterday                 Problem: Mobility       Dates: Start: 05/03/22         Goal: STG-Within one week, patient will ambulate household distance with FWW x150 CGA with improved posture/L knee stability       Dates: Start: 05/03/22         Goal Note filed on 05/04/22 0824 by Karol Carlson PTA       PT eval performed yesterday              Goal: STG-Within one week, patient will ascend and descend four to six stairs 1 HR, 6\" steps with Lan       Dates: Start: 05/03/22         Goal Note filed on 05/04/22 0824 by Karol Carlson PTA       PT eval performed yesterday            "      Problem: Mobility Transfers       Dates: Start: 05/03/22         Goal: STG-Within one week, patient will transfer bed to chair CGA SPT       Dates: Start: 05/03/22         Goal Note filed on 05/04/22 0824 by Karol Carlson PTA       PT kristine performed yesterday                 Problem: PT-Long Term Goals       Dates: Start: 05/03/22         Goal: LTG-By discharge, patient will ambulate with FWW x100' indoors SPV       Dates: Start: 05/03/22            Goal: LTG-By discharge, patient will transfer one surface to another Magen w/c<>bed       Dates: Start: 05/03/22            Goal: LTG-By discharge, patient will perform home exercise program with set up       Dates: Start: 05/03/22            Goal: LTG-By discharge, patient will ambulate up/down 4-6 stairs SPV with 1 HR       Dates: Start: 05/03/22

## 2022-05-09 NOTE — THERAPY
Occupational Therapy  Daily Treatment     Patient Name: Ashlee Sepulveda  Age:  72 y.o., Sex:  female  Medical Record #: [id number]  Today's Date: 5/9/2022     Precautions  Precautions: (P) Fall Risk  Comments: L hemiparesis, WBAT LLE, hx of falls    Safety   ADL Safety : Requires Supervision for Safety  Bathroom Safety: Requires Supervision for Safety    Subjective    Patient seated in w/c in her room.  Agreeable to a shower this afternoon.     Objective       05/09/22 1231   Precautions   Precautions Fall Risk   Functional Level of Assist   Grooming Seated;Supervision   Grooming Description Set-up of equipment  (to brush hair; therapist retrieved brush from closet)   Bathing Standby Assist   Bathing Description Grab bar;Hand held shower;Set-up of equipment;Supervision for safety;Tub bench   Upper Body Dressing Minimal Assist   Upper Body Dressing Description Set-up of equipment;Supervision for safety  (assist w/ 2/12 tasks to don/doff shirt and doff sports bra)   Lower Body Dressing Minimal Assist   Lower Body Dressing Description   (assist w/ 4/16 tasks)   Toileting Standby Assist   Toileting Description Set-up of equipment;Supervision for safety   Toilet Transfers Standby Assist   Toilet Transfer Description Grab bar;Set-up of equipment;Supervision for safety   Tub / Shower Transfers Standby Assist   Tub Shower Transfer Description Grab bar;Shower bench;Set-up of equipment;Supervision for safety   Interdisciplinary Plan of Care Collaboration   Patient Position at End of Therapy Seated;Call Light within Reach;Tray Table within Reach;Phone within Reach;Chair Alarm On;Self Releasing Lap Belt Applied   OT Total Time Spent   OT Individual Total Time Spent (Mins) 60   OT Charge Group   OT Self Care / ADL 4       Assessment    Patient demonstrated improvements in toilet and shower transfers and toileting this session.  Still requires min A for UB/LB dressing due to assist with socks and doffing sports bra.  Strengths: Able  to follow instructions,Effective communication skills,Good insight into deficits/needs,Independent prior level of function,Manages pain appropriately,Motivated for self care and independence,Pleasant and cooperative,Supportive family,Willingly participates in therapeutic activities  Barriers: Decreased endurance,Generalized weakness,Hemiparesis,Home accessibility,Impaired activity tolerance,Impaired balance,Limited mobility (fragile skin)    Plan    Continue OT POC with ongoing strengthening, endurance building, ADL retraining and LUE neuro re-ed.     Occupational Therapy Goals (Active)       Problem: Bathing       Dates: Start: 05/03/22         Goal: STG-Within one week, patient will bathe with Lan and use of AE as needed.       Dates: Start: 05/03/22         Goal Note filed on 05/04/22 0649 by Vinicius Kimble C.O.T.A.       Evaluated day prior to this documentation   Limited by decreased balance, decreased left UE function due to  hemiparesis/Decreased attention/ coordination   Continue goal                  Problem: Dressing       Dates: Start: 05/03/22         Goal: STG-Within one week, patient will dress LB with Lan and use of AE as needed.       Dates: Start: 05/03/22         Goal Note filed on 05/04/22 0649 by Vinicius Kimble C.O.T.A.       Evaluated day prior to this documentation   Limited by decreased balance, decreased left UE function due to  hemiparesis/Decreased attention/ coordination   Continue goal                  Problem: Functional Transfers       Dates: Start: 05/03/22         Goal: STG-Within one week, patient will transfer to toilet with CGA and use of AE as needed.       Dates: Start: 05/03/22         Goal Note filed on 05/04/22 0649 by Vinicius Kimble C.O.T.A.       Evaluated day prior to this documentation   Limited by decreased balance, decreased left UE function due to  hemiparesis/Decreased attention/ coordination   Continue goal                  Problem: OT Long Term Goals        Dates: Start: 05/03/22         Goal: LTG-By discharge, patient will complete basic self care tasks with supervision to modified independence and use of AE as needed.       Dates: Start: 05/03/22            Goal: LTG-By discharge, patient will perform bathroom transfers with supervision to modified independence and use of AE as needed.       Dates: Start: 05/03/22               Problem: Toileting       Dates: Start: 05/03/22         Goal: STG-Within one week, patient will complete toileting tasks with Lan and use of AE as needed.       Dates: Start: 05/03/22         Goal Note filed on 05/04/22 0649 by DRU [email or web address].       Evaluated day prior to this documentation   Limited by decreased balance, decreased left UE function due to  hemiparesis/Decreased attention/ coordination   Continue goal

## 2022-05-09 NOTE — THERAPY
Occupational Therapy  Daily Treatment     Patient Name: Ashlee Sepulveda  Age:  72 y.o., Sex:  female  Medical Record #: [id number]  Today's Date: 5/9/2022     Precautions  Precautions: Fall Risk  Comments: L hemiparesis, WBAT LLE, hx of falls    Safety   ADL Safety : Requires Supervision for Safety  Bathroom Safety: Requires Supervision for Safety    Subjective    Pt supine in bed upon arrival, agreeable to OT session. Pt declined ADLs this session, stating that she already completed them with OT this morning.     Objective       05/09/22 1001   Functional Level of Assist   Bed, Chair, Wheelchair Transfer Contact Guard Assist  (FWW stand pivot to W/c)   Fine Motor / Dexterity    Comments  Completed clothespins activity using LUE to place clothespins onto edge of container, complete 20 clothespins. Compelted PVC pipe tree activity with BUE, VC to incorporate LUE to grab pieces out of buckets and to push pieces together.   Interdisciplinary Plan of Care Collaboration   Patient Position at End of Therapy Seated;Chair Alarm On;Call Light within Reach;Tray Table within Reach   OT Total Time Spent   OT Individual Total Time Spent (Mins) 30   OT Charge Group   OT Therapy Activity 2       Assessment    Pt tolerated session well with VC to incorporate LUE into BUE task. Pt stated that it felt good to use both hands and was able to complete task with SBA overall. Pt completed clothespin task to continue neuro re-edu of LUE and increasing functional movements of the hand.    Strengths: Able to follow instructions,Effective communication skills,Good insight into deficits/needs,Independent prior level of function,Manages pain appropriately,Motivated for self care and independence,Pleasant and cooperative,Supportive family,Willingly participates in therapeutic activities  Barriers: Decreased endurance,Generalized weakness,Hemiparesis,Home accessibility,Impaired activity tolerance,Impaired balance,Limited mobility (fragile  skin)    Plan    Continue OT POC with ongoing strengthening, endurance building, ADL retraining and LUE neuro re-ed.     Passport items to be completed:  Perform bathroom transfers, complete dressing, complete feeding, get ready for the day, prepare a simple meal, participate in household tasks, adapt home for safety needs, demonstrate home exercise program, complete caregiver training     Occupational Therapy Goals (Active)       Problem: Bathing       Dates: Start: 05/03/22         Goal: STG-Within one week, patient will bathe with Lan and use of AE as needed.       Dates: Start: 05/03/22         Goal Note filed on 05/04/22 0649 by Vinicius Kimble C.O.T.A.       Evaluated day prior to this documentation   Limited by decreased balance, decreased left UE function due to  hemiparesis/Decreased attention/ coordination   Continue goal                  Problem: Dressing       Dates: Start: 05/03/22         Goal: STG-Within one week, patient will dress LB with Lan and use of AE as needed.       Dates: Start: 05/03/22         Goal Note filed on 05/04/22 0649 by Vinicius Kimble C.O.T.A.       Evaluated day prior to this documentation   Limited by decreased balance, decreased left UE function due to  hemiparesis/Decreased attention/ coordination   Continue goal                  Problem: Functional Transfers       Dates: Start: 05/03/22         Goal: STG-Within one week, patient will transfer to toilet with CGA and use of AE as needed.       Dates: Start: 05/03/22         Goal Note filed on 05/04/22 0649 by Vinicius Kimble C.O.T.A.       Evaluated day prior to this documentation   Limited by decreased balance, decreased left UE function due to  hemiparesis/Decreased attention/ coordination   Continue goal                  Problem: OT Long Term Goals       Dates: Start: 05/03/22         Goal: LTG-By discharge, patient will complete basic self care tasks with supervision to modified independence and use of AE as needed.        Dates: Start: 05/03/22            Goal: LTG-By discharge, patient will perform bathroom transfers with supervision to modified independence and use of AE as needed.       Dates: Start: 05/03/22               Problem: Toileting       Dates: Start: 05/03/22         Goal: STG-Within one week, patient will complete toileting tasks with Lan and use of AE as needed.       Dates: Start: 05/03/22         Goal Note filed on 05/04/22 0649 by Vinicius Kimble C.O.T.A.       Evaluated day prior to this documentation   Limited by decreased balance, decreased left UE function due to  hemiparesis/Decreased attention/ coordination   Continue goal

## 2022-05-09 NOTE — PROGRESS NOTES
"Rehab Progress Note     Date of Service: 5/9/2022  Chief Complaint: Follow-up stroke    Interval Events (Subjective)    Patient seen and examined today in the therapy gym.   She is working with OT on fine motor tasking and clothing management.    She is complaining of pain in her left hip. With further questioning, the pain is actually closer to her tail bone on the left side. She did fall onto her bottom last week in the bathroom. She reports the pain is worse when she lies flat on the mat, but not in her bed. She was not aware that she could tell the therapists it was too painful to lay on the mat.     She feels like she is about 80% back to her normal function in term of her stroke symptoms. She is tearful, and reports this is a combination of kris and sadness.    Blood pressure has improved over the weekend. Still hs not needed to start her home blood pressure medications.     She is looking forward to her discharge home this week.       ROS: No changes to bowel, bladder, mood, or sleep.          Objective:  VITAL SIGNS: /81   Pulse 70   Temp 36.6 °C (97.8 °F) (Oral)   Resp 16   Ht 1.676 m (5' 6\")   Wt 59 kg (130 lb)   SpO2 95%   BMI 20.98 kg/m²   Gen: alert, no apparent distress  CV: Regular rate and rhythm  Resp: Clear to auscultation bilaterally  Msk: TTP over the left lateral sacrum/coccyx   Neuro: left sided incoordination  Psych: tearful    Recent Results (from the past 72 hour(s))   Basic Metabolic Panel    Collection Time: 05/07/22  7:45 AM   Result Value Ref Range    Sodium 138 135 - 145 mmol/L    Potassium 5.0 3.6 - 5.5 mmol/L    Chloride 108 96 - 112 mmol/L    Co2 20 20 - 33 mmol/L    Glucose 86 65 - 99 mg/dL    Bun 36 (H) 8 - 22 mg/dL    Creatinine 0.98 0.50 - 1.40 mg/dL    Calcium 8.5 8.5 - 10.5 mg/dL    Anion Gap 10.0 7.0 - 16.0   MAGNESIUM    Collection Time: 05/07/22  7:45 AM   Result Value Ref Range    Magnesium 2.3 1.5 - 2.5 mg/dL   PHOSPHORUS    Collection Time: 05/07/22  7:45 AM "   Result Value Ref Range    Phosphorus 2.9 2.5 - 4.5 mg/dL   proBrain Natriuretic Peptide, NT    Collection Time: 05/07/22  7:45 AM   Result Value Ref Range    NT-proBNP 1038 (H) 0 - 125 pg/mL   ESTIMATED GFR    Collection Time: 05/07/22  7:45 AM   Result Value Ref Range    GFR (CKD-EPI) 61 >60 mL/min/1.73 m 2       Current Facility-Administered Medications   Medication Frequency   • traMADol (ULTRAM) 50 MG tablet 50 mg Q4HRS PRN   • oxyCODONE immediate-release (ROXICODONE) tablet 5 mg Q4HRS PRN   • vitamin D3 (cholecalciferol) tablet 1,000 Units DAILY   • hydrOXYzine HCl (ATARAX) tablet 50 mg Q6HRS PRN   • melatonin tablet 3 mg HS PRN   • Respiratory Therapy Consult Continuous RT   • Pharmacy Consult Request ...Pain Management Review 1 Each PHARMACY TO DOSE   • hydrALAZINE (APRESOLINE) tablet 10 mg Q8HRS PRN   • acetaminophen (Tylenol) tablet 650 mg Q4HRS PRN   • senna-docusate (PERICOLACE or SENOKOT S) 8.6-50 MG per tablet 2 Tablet BID    And   • polyethylene glycol/lytes (MIRALAX) PACKET 1 Packet QDAY PRN    And   • magnesium hydroxide (MILK OF MAGNESIA) suspension 30 mL QDAY PRN    And   • bisacodyl (DULCOLAX) suppository 10 mg QDAY PRN   • lactulose 20 GM/30ML solution 30 mL QDAY PRN   • omeprazole (PRILOSEC) capsule 20 mg QAM AC   • artificial tears ophthalmic solution 1 Drop PRN   • benzocaine-menthol (Cepacol) lozenge 1 Lozenge Q2HRS PRN   • mag hydrox-al hydrox-simeth (MAALOX PLUS ES or MYLANTA DS) suspension 20 mL Q2HRS PRN   • ondansetron (ZOFRAN ODT) dispertab 4 mg 4X/DAY PRN    Or   • ondansetron (ZOFRAN) syringe/vial injection 4 mg 4X/DAY PRN   • traZODone (DESYREL) tablet 50 mg QHS PRN   • sodium chloride (OCEAN) 0.65 % nasal spray 2 Spray PRN   • aspirin EC (ECOTRIN) tablet 81 mg DAILY   • clopidogrel (PLAVIX) tablet 75 mg DAILY   • midazolam (VERSED) 5 mg/mL (1 mL vial) PRN   • atorvastatin (LIPITOR) tablet 40 mg Q EVENING       Orders Placed This Encounter   Procedures   • Diet Order Diet: Cardiac      Standing Status:   Standing     Number of Occurrences:   1     Order Specific Question:   Diet:     Answer:   Cardiac [6]       Radiology    DX-HIP-COMPLETE - UNILATERAL 2+ LEFT   Final Result      No radiographic evidence of acute traumatic injury.      DX-CHEST-PORTABLE (1 VIEW)   Final Result         No acute cardiac or pulmonary abnormality is identified.            Assessment:    This patient is a 72 y.o. female admitted for acute inpatient rehabilitation with Ischemic stroke (HCC).    I led and attended the weekly conference, and agree with the IDT conference documentation and plan of care as noted below.    Date of conference: 5/4/2022    Goals and barriers: See IDT note.    Biggest barriers: left hemiparesis, knee hypertension, 3 stairs into home    CM/social support: family supportive    Anticipated DC date: 5/12    Home health: PT/OT/SLP/RN    Equip: FWW    Follow up: PCP, Dr. Wilfrid sim      Problem List/Medical Decision Making and Plan:    Right frontal parietal ischemic stroke  Left hemiparesis, improved  Nondominant  Left neglect, improved  Cognitive impairment, improved  Continue full rehab program  PT/OT/SLP, 1 hr each discipline, 5 days per week  5/5: decrease SLP to 30 min, share other 30 min with PPT/OT    Aspirin  Plavix  Eliquis    Outpatient follow up with Dr. Wilfrid sim, referrals made    Consult Dr. Ortega for adjustment to disability, appreciate assistance    Left hip pain  Status post fall 5/5, unwitnessed in bathroom  X-ray without any evidence of fracture    Left sacral pain  Check xray  ICE  Diclofenac topical    Hypertension  Home medications included carvedilol 6.25 mg twice daily, lisinopril 20 mg daily, Lasix 20 mg daily, spironolactone 25 mg daily  Appreciate hospitalist assistance  Restarted on low-dose of lisinopril, now discontinued  Currently not requiring any medications for BP     Chronic systolic CHF  Ejection fraction of 35 to 40%  Elevated  BNP  Low-dose lisinopril, discontinued due to low BPs  Previously on Lasix 20 mg daily and spironolactone 25 mg daily  Appreciate hospitalist assistance     Coronary artery disease  History of 3 coronary stents  Aspirin  Plavix  Statin  Eliquis  Outpatient follow up with cardiology    History of pacemaker placement  Paced rhythm  Outpatient follow up with cardiology    GI prophylaxis  Omeprazole     Chronic kidney disease, stage III  Avoid nephrotoxins  Renally dose medications  Monitor with intermittent labs , recheck in am     COPD  Respiratory therapist evaluation    Leukocytosis, improved  Afebrile  Negative CXR  Positive UA  Appreciate hospitalist assistance  Recheck in am    Positive urinalysis  Polyuria, stable  Culture negative     Peripheral vascular disease  History of stents in the bilateral iliac arteries  Aspirin  Plavix  Statin  Eliquis  Outpatient follow up with vascular surgery as needed     Sleep apnea  Did not tolerate CPAP     Anemia  Monitor with intermittent labs, recheck in am    GI prophylaxis  Omeprazole    Vitamin D deficiency, 15  Continue supplementation    Bowel program  Continue bowel medications  Last BM 5/9    Bladder program  Check PVRs - 1, 39  Not retaining  Bladder scan for no voids  ICP for over 400 cc  Scheduled tolieting    DVT prophylaxis  Eliquis      Total time:  35 minutes.  I spent greater than 50% of the time for patient care, counseling, and coordination on this date, including patient face-to face time, unit/floor time with review of records/pertinent lab data and studies, as well as discussing diagnostic evaluation/work up, planned therapeutic interventions, and future disposition of care, as per the interval events/subjective and the assessment and plan as noted above.      I have performed a physical exam, reviewed and updated ROS, as well as the assessment and plan today 5/9/2022. In review of note from 5/6/2022 there are no new changes except as documented  above.        Abida Boyce M.D.   Physical Medicine and Rehabilitation

## 2022-05-09 NOTE — PROGRESS NOTES
[facility] Medicine Daily Progress Note    Date of Service  5/9/2022    Chief Complaint:  Hypertension  Leukocytosis  CKD    Interval History:  No complaints.  Doing ok.    Review of Systems  Review of Systems   Constitutional: Negative for chills and fever.   Respiratory: Negative for shortness of breath.    Cardiovascular: Negative for chest pain.   Gastrointestinal: Negative for abdominal pain, diarrhea, nausea and vomiting.   Psychiatric/Behavioral: The patient is not nervous/anxious.         Physical Exam  Temp:  [36.6 °C (97.8 °F)-36.8 °C (98.2 °F)] 36.6 °C (97.8 °F)  Pulse:  [68-70] 70  Resp:  [16-18] 16  BP: (123-132)/(73-81) 130/81  SpO2:  [95 %-97 %] 95 %    Physical Exam  Vitals and nursing note reviewed.   Constitutional:       Appearance: Normal appearance.   HENT:      Head: Atraumatic.   Eyes:      Conjunctiva/sclera: Conjunctivae normal.      Pupils: Pupils are equal, round, and reactive to light.   Cardiovascular:      Rate and Rhythm: Normal rate and regular rhythm.      Heart sounds: No murmur heard.  Pulmonary:      Effort: Pulmonary effort is normal.      Breath sounds: No stridor. No wheezing or rales.   Abdominal:      General: There is no distension.      Palpations: Abdomen is soft.      Tenderness: There is no abdominal tenderness.   Musculoskeletal:      Cervical back: Normal range of motion and neck supple.      Right lower leg: No edema.      Left lower leg: No edema.   Skin:     General: Skin is warm and dry.      Findings: No rash.   Neurological:      Mental Status: She is alert and oriented to person, place, and time.   Psychiatric:         Mood and Affect: Mood normal.         Behavior: Behavior normal.         Fluids    Intake/Output Summary (Last 24 hours) at 5/9/2022 0941  Last data filed at 5/8/2022 1803  Gross per 24 hour   Intake 680 ml   Output --   Net 680 ml       Laboratory      Recent Labs     05/07/22  0745   SODIUM 138   POTASSIUM 5.0   CHLORIDE 108   CO2 20   GLUCOSE 86    BUN 36*   CREATININE 0.98   CALCIUM 8.5                   Imaging    Assessment/Plan  * Ischemic stroke (HCC)- (present on admission)  Assessment & Plan  S/P CVA  Had acute onset of left-sided weakness and slurred speech  MRI showed acute right frontal and parietal watershed strokes  Carotid U/S: negative for any significant stenosis  Echo: showed an EF of 35 to 40% as well as mild pericardial effusion  On ASA & Plavix  On Lipitor    Systolic CHF, chronic (HCC)- (present on admission)  Assessment & Plan  O2 sats good on RA  Echo: showed an EF of 35 to 40% as well as mild pericardial effusion  BNP: 1022 (5/3) --> 1038 (5/7)  CXR: unremarkable, no edema or effusions    Leukocytosis- (present on admission)  Assessment & Plan  WBC's: 13.3 (5/3) --> 11.4 (5/5)   Has been afebrile  CXR: unremarkable  Urine cultures: neg  Monitor fir now    High potassium- (present on admission)  Assessment & Plan  K+: 5.2 --> 4.8 (5/5) --> 5.0 (5/7)  Now on a low K+ diet  ? 2nd to CKD  ? 2nd to Lisinopril --> d/c'd (last dose 5/3)  Monitor    CKD (chronic kidney disease)- (present on admission)  Assessment & Plan  Reported hx of CKD stage 3  Bun/Cr: 41/1.28 --> 51/1.29 --> 36/0.98 (5/7)  Not sure of baseline  Encouraging fluid intake  S/P gentle IVF's x 1/2 liter  Cont to monitor     Vitamin D deficiency- (present on admission)  Assessment & Plan  Vit D: 15  On supplements    Essential hypertension- (present on admission)  Assessment & Plan  BP ok  Off Lisinopril  Currently not on any hypertensive meds  Monitor

## 2022-05-09 NOTE — CARE PLAN
The patient is Stable - Low risk of patient condition declining or worsening    Shift Goals  Clinical Goals: safety  Patient Goals: Safety      Problem: Pain - Standard  Goal: Alleviation of pain or a reduction in pain to the patient’s comfort goal  Outcome: Progressing: Pt requested PRN Tylenol at bedtime for pain 3/10 in her right hip. Pt informed to ring the call light anytime during the night if she needs any more pain medication. Pt stated she understood.        Problem: Skin Integrity  Goal: Patient's skin integrity will be maintained or improve  Outcome: Progressing: Pt able to reposition self in bed. Skin remains intact and free from new or accidental injury. Pt has a waffle mattress for preventive measures as her sacrum has some redness but still blanches.

## 2022-05-09 NOTE — THERAPY
Physical Therapy   Daily Treatment     Patient Name: Ashlee Sepulveda  Age:  72 y.o., Sex:  female  Medical Record #: [id number]  Today's Date: 5/9/2022     Precautions  Precautions: Fall Risk  Comments: L carolyne    Subjective    Pt up in wc, willing to participate.     Objective       05/09/22 1031   Gait Functional Level of Assist    Gait Level Of Assist Contact Guard Assist   Assistive Device Front Wheel Walker   Distance (Feet) 100  (in addition to 60 ft x 2)   # of Times Distance was Traveled 2   Deviation Antalgic;Bradykinetic;Decreased Heel Strike;Decreased Toe Off  (LLE decreased foot clearance during swing / impaired terminal hip/knee ext with stance)   Transfer Functional Level of Assist   Bed, Chair, Wheelchair Transfer Contact Guard Assist   Bed Chair Wheelchair Transfer Description Adaptive equipment;Increased time;Set-up of equipment;Verbal cueing   Supine Lower Body Exercise   Bridges Two Legged;3 sets of 10   Trunk Rotation 3 sets of 10;Light Resistance Theraband   Hip Abduction Hook Lying;3 sets of 10   Comments supine exercises completed on standard bed for comfort   Standing Lower Body Exercises   Standing Lower Body Exercises Yes   Hip Abduction 3 sets of 10   Heel Rise 3 sets of 10   Other Exercises pre-gait stepping over half bolster roll 2 x 10 with cues for L foot clearance and heel strike with initial contact   Comments Standing exercises completed with UE support at // bars, mirror for visual feedback/posture re-ed and tactile cues for L terminal hip/ knee ext.   Bed Mobility    Supine to Sit Supervised   Sit to Supine Supervised   Sit to Stand Contact Guard Assist   Neuro-Muscular Treatments   Neuro-Muscular Treatments Anterior weight shift;Biofeedback;Facilitation;Joint Approximation;Sequencing;Tactile Cuing;Verbal Cuing;Weight Shift Right;Weight Shift Left   Comments neuro re-ed strength/ standing posture re-ed/ pre-gait and forced use WB to LLE with standing and supine exercises as noted.  "  PT Total Time Spent   PT Individual Total Time Spent (Mins) 60   PT Charge Group   PT Gait Training 2   PT Neuromuscular Re-Education / Balance 2       Assessment    Pt presents with slow gait celso, impaired L foot clearance with swing and impaired terminal hip/knee ext with standing/ stance phase. Pt able to improve upright posture and terminal hip/ knee ext with mirror for visual feedback.     Strengths: Able to follow instructions,Adequate strength,Good insight into deficits/needs,Independent prior level of function,Good carryover of learning,Motivated for self care and independence,Pleasant and cooperative,Supportive family,Willingly participates in therapeutic activities  Barriers: Decreased endurance,Hemiparesis,Impaired balance    Plan    Ambulation w/ FWW, STS from varying surface and work on hand placement, standing balance, general strengthening and endurance, bed mobility.     Passport items to be completed:  Get in/out of bed safely, in/out of a vehicle, safely use mobility device, walk or wheel around home/community, navigate up and down stairs, show how to get up/down from the ground, ensure home is accessible, demonstrate HEP, complete caregiver training           Physical Therapy Problems (Active)       Problem: Balance       Dates: Start: 05/03/22         Goal: STG-Within one week, patient will maintain dynamic standing perform Avila       Dates: Start: 05/03/22         Goal Note filed on 05/04/22 0824 by Karol Carlson PTA       PT eval performed yesterday                 Problem: Mobility       Dates: Start: 05/03/22         Goal: STG-Within one week, patient will ambulate household distance with FWW x150 CGA with improved posture/L knee stability       Dates: Start: 05/03/22         Goal Note filed on 05/04/22 0824 by Karol Carlson PTA       PT eval performed yesterday              Goal: STG-Within one week, patient will ascend and descend four to six stairs 1 HR, 6\" steps with Lan       " Dates: Start: 05/03/22         Goal Note filed on 05/04/22 0824 by Karol Carlson PTA       PT eval performed yesterday                 Problem: Mobility Transfers       Dates: Start: 05/03/22         Goal: STG-Within one week, patient will transfer bed to chair CGA SPT       Dates: Start: 05/03/22         Goal Note filed on 05/04/22 0824 by Karol Carlson PTA       PT eval performed yesterday                 Problem: PT-Long Term Goals       Dates: Start: 05/03/22         Goal: LTG-By discharge, patient will ambulate with FWW x100' indoors SPV       Dates: Start: 05/03/22            Goal: LTG-By discharge, patient will transfer one surface to another Magen w/c<>bed       Dates: Start: 05/03/22            Goal: LTG-By discharge, patient will perform home exercise program with set up       Dates: Start: 05/03/22            Goal: LTG-By discharge, patient will ambulate up/down 4-6 stairs SPV with 1 HR       Dates: Start: 05/03/22

## 2022-05-09 NOTE — THERAPY
Occupational Therapy  Daily Treatment     Patient Name: Ashlee Sepulveda  Age:  72 y.o., Sex:  female  Medical Record #: [id number]  Today's Date: 5/9/2022     Precautions  Precautions: Fall Risk  Comments: L hemiparesis, WBAT LLE, hx of falls    Safety   ADL Safety : Requires Supervision for Safety  Bathroom Safety: Requires Supervision for Safety    Subjective    Patient was tearful while seated at table in gym.  Stated she was in pain after laying on the shuttle. Requested to work on FMC and strength of left hand.     Objective       05/09/22 0901   Precautions   Precautions Fall Risk   Comments L hemiparesis, WBAT LLE, hx of falls   Functional Level of Assist   Bed, Chair, Wheelchair Transfer Standby Assist   Bed Chair Wheelchair Transfer Description Set-up of equipment;Supervision for safety  (SBA SPT w/c to bed with bedrail)   Hand Strengthening   Hand Strengthening Left Pinch;Left Finger Flexion;Theraputty (Comment on Resistance)   Comment L UE used to  and place wooden clothespins on edge of bucket (x17) for pinch strengthening.  Used pink theraputty for left hand pinch strengthening x 2-3 minutes.   Fine Motor / Dexterity    Fine Motor / Dexterity Interventions Isolated left finger extension exercises with hand flat on table, 2 x 10 for digits 2-5.  Thumb ab/adduction 2 x 10. Bilateral UE integration and FMC task working on various types of fasteners (buttons, clasps, zippers, bows and snaps).   Bed Mobility    Sit to Supine Supervised   Interdisciplinary Plan of Care Collaboration   IDT Collaboration with  Physician   Patient Position at End of Therapy In Bed;Call Light within Reach;Tray Table within Reach;Phone within Reach   Collaboration Comments Dr Boyce assessed patient and requested patient be put back to bed and given ice for sacral pain   OT Total Time Spent   OT Individual Total Time Spent (Mins) 30   OT Charge Group   OT Therapy Activity 1   OT Therapeutic Exercise  1        Assessment    Patient was tearful throughout session at different times, but was motivated to work on increasing left hand strength and FMC.    Strengths: Able to follow instructions,Effective communication skills,Good insight into deficits/needs,Independent prior level of function,Manages pain appropriately,Motivated for self care and independence,Pleasant and cooperative,Supportive family,Willingly participates in therapeutic activities  Barriers: Decreased endurance,Generalized weakness,Hemiparesis,Home accessibility,Impaired activity tolerance,Impaired balance,Limited mobility (fragile skin)    Plan    Continue OT POC with ongoing strengthening, endurance building, ADL retraining and LUE neuro re-ed.     Occupational Therapy Goals (Active)       Problem: Bathing       Dates: Start: 05/03/22         Goal: STG-Within one week, patient will bathe with Lan and use of AE as needed.       Dates: Start: 05/03/22         Goal Note filed on 05/04/22 0649 by Vinicius Kimble, C.O.T.A.       Evaluated day prior to this documentation   Limited by decreased balance, decreased left UE function due to  hemiparesis/Decreased attention/ coordination   Continue goal                  Problem: Dressing       Dates: Start: 05/03/22         Goal: STG-Within one week, patient will dress LB with Lan and use of AE as needed.       Dates: Start: 05/03/22         Goal Note filed on 05/04/22 0649 by Vinicius Kimble, C.O.T.A.       Evaluated day prior to this documentation   Limited by decreased balance, decreased left UE function due to  hemiparesis/Decreased attention/ coordination   Continue goal                  Problem: Functional Transfers       Dates: Start: 05/03/22         Goal: STG-Within one week, patient will transfer to toilet with CGA and use of AE as needed.       Dates: Start: 05/03/22         Goal Note filed on 05/04/22 0649 by Vinicius Kimble C.O.T.A.       Evaluated day prior to this documentation   Limited by  decreased balance, decreased left UE function due to  hemiparesis/Decreased attention/ coordination   Continue goal                  Problem: OT Long Term Goals       Dates: Start: 05/03/22         Goal: LTG-By discharge, patient will complete basic self care tasks with supervision to modified independence and use of AE as needed.       Dates: Start: 05/03/22            Goal: LTG-By discharge, patient will perform bathroom transfers with supervision to modified independence and use of AE as needed.       Dates: Start: 05/03/22               Problem: Toileting       Dates: Start: 05/03/22         Goal: STG-Within one week, patient will complete toileting tasks with Lan and use of AE as needed.       Dates: Start: 05/03/22         Goal Note filed on 05/04/22 0649 by Vinicius Kimble C.O.T.MARITZA.       Evaluated day prior to this documentation   Limited by decreased balance, decreased left UE function due to  hemiparesis/Decreased attention/ coordination   Continue goal

## 2022-05-09 NOTE — CARE PLAN
"  Problem: Fall Risk - Rehab  Goal: Patient will remain free from falls  Outcome: Progressing   Ella Sandoval Fall risk Assessment Score: 12    Moderate fall risk Interventions  - Bed and strip alarm   - Yellow sign by the door   - Yellow wrist band \"Fall risk\"  - Room near to the nurse station  - Do not leave patient unattended in the bathroom  - Fall risk education provided       Problem: Pain - Standard  Goal: Alleviation of pain or a reduction in pain to the patient’s comfort goal  Outcome: Progressing   Patient is able to rate pain on a scale of 1-10; patient is experiencing significant lower left sacral area pain. Imaging complete, skin assessment complete (skin is pale red and intact). Patient refuses narcotic/opioid for pain relief, but does find relief with Tylenol. Topical Volteran gel is scheduled 2/day and patient reports medication is helpful in reducing pain and discomfort. Ice pack in place.       "

## 2022-05-10 PROBLEM — I50.40 COMBINED CONGESTIVE SYSTOLIC AND DIASTOLIC HEART FAILURE (HCC): Status: ACTIVE | Noted: 2022-05-03

## 2022-05-10 LAB
ANION GAP SERPL CALC-SCNC: 9 MMOL/L (ref 7–16)
BASOPHILS # BLD AUTO: 0.3 % (ref 0–1.8)
BASOPHILS # BLD: 0.03 K/UL (ref 0–0.12)
BUN SERPL-MCNC: 28 MG/DL (ref 8–22)
CALCIUM SERPL-MCNC: 8.8 MG/DL (ref 8.5–10.5)
CHLORIDE SERPL-SCNC: 109 MMOL/L (ref 96–112)
CO2 SERPL-SCNC: 23 MMOL/L (ref 20–33)
CREAT SERPL-MCNC: 1.13 MG/DL (ref 0.5–1.4)
EOSINOPHIL # BLD AUTO: 0.23 K/UL (ref 0–0.51)
EOSINOPHIL NFR BLD: 2.7 % (ref 0–6.9)
ERYTHROCYTE [DISTWIDTH] IN BLOOD BY AUTOMATED COUNT: 51.6 FL (ref 35.9–50)
GFR SERPLBLD CREATININE-BSD FMLA CKD-EPI: 51 ML/MIN/1.73 M 2
GLUCOSE SERPL-MCNC: 89 MG/DL (ref 65–99)
HCT VFR BLD AUTO: 32 % (ref 37–47)
HGB BLD-MCNC: 10.4 G/DL (ref 12–16)
IMM GRANULOCYTES # BLD AUTO: 0.11 K/UL (ref 0–0.11)
IMM GRANULOCYTES NFR BLD AUTO: 1.3 % (ref 0–0.9)
LYMPHOCYTES # BLD AUTO: 1.87 K/UL (ref 1–4.8)
LYMPHOCYTES NFR BLD: 21.7 % (ref 22–41)
MCH RBC QN AUTO: 30.7 PG (ref 27–33)
MCHC RBC AUTO-ENTMCNC: 32.5 G/DL (ref 33.6–35)
MCV RBC AUTO: 94.4 FL (ref 81.4–97.8)
MONOCYTES # BLD AUTO: 0.79 K/UL (ref 0–0.85)
MONOCYTES NFR BLD AUTO: 9.2 % (ref 0–13.4)
NEUTROPHILS # BLD AUTO: 5.58 K/UL (ref 2–7.15)
NEUTROPHILS NFR BLD: 64.8 % (ref 44–72)
NRBC # BLD AUTO: 0 K/UL
NRBC BLD-RTO: 0 /100 WBC
NT-PROBNP SERPL IA-MCNC: 1613 PG/ML (ref 0–125)
PLATELET # BLD AUTO: 283 K/UL (ref 164–446)
PMV BLD AUTO: 9.8 FL (ref 9–12.9)
POTASSIUM SERPL-SCNC: 4.5 MMOL/L (ref 3.6–5.5)
RBC # BLD AUTO: 3.39 M/UL (ref 4.2–5.4)
SODIUM SERPL-SCNC: 141 MMOL/L (ref 135–145)
WBC # BLD AUTO: 8.6 K/UL (ref 4.8–10.8)

## 2022-05-10 PROCEDURE — 36415 COLL VENOUS BLD VENIPUNCTURE: CPT

## 2022-05-10 PROCEDURE — 97110 THERAPEUTIC EXERCISES: CPT | Mod: CQ

## 2022-05-10 PROCEDURE — 97530 THERAPEUTIC ACTIVITIES: CPT

## 2022-05-10 PROCEDURE — 97530 THERAPEUTIC ACTIVITIES: CPT | Mod: CQ

## 2022-05-10 PROCEDURE — 99232 SBSQ HOSP IP/OBS MODERATE 35: CPT | Performed by: PHYSICAL MEDICINE & REHABILITATION

## 2022-05-10 PROCEDURE — 700102 HCHG RX REV CODE 250 W/ 637 OVERRIDE(OP): Performed by: PHYSICAL MEDICINE & REHABILITATION

## 2022-05-10 PROCEDURE — 97110 THERAPEUTIC EXERCISES: CPT

## 2022-05-10 PROCEDURE — 97116 GAIT TRAINING THERAPY: CPT | Mod: CQ

## 2022-05-10 PROCEDURE — 770010 HCHG ROOM/CARE - REHAB SEMI PRIVAT*

## 2022-05-10 PROCEDURE — 85025 COMPLETE CBC W/AUTO DIFF WBC: CPT

## 2022-05-10 PROCEDURE — 97535 SELF CARE MNGMENT TRAINING: CPT

## 2022-05-10 PROCEDURE — 99232 SBSQ HOSP IP/OBS MODERATE 35: CPT | Performed by: HOSPITALIST

## 2022-05-10 PROCEDURE — A9270 NON-COVERED ITEM OR SERVICE: HCPCS | Performed by: HOSPITALIST

## 2022-05-10 PROCEDURE — 97112 NEUROMUSCULAR REEDUCATION: CPT | Mod: CQ

## 2022-05-10 PROCEDURE — 80048 BASIC METABOLIC PNL TOTAL CA: CPT

## 2022-05-10 PROCEDURE — 700102 HCHG RX REV CODE 250 W/ 637 OVERRIDE(OP): Performed by: HOSPITALIST

## 2022-05-10 PROCEDURE — A9270 NON-COVERED ITEM OR SERVICE: HCPCS | Performed by: PHYSICAL MEDICINE & REHABILITATION

## 2022-05-10 PROCEDURE — 83880 ASSAY OF NATRIURETIC PEPTIDE: CPT

## 2022-05-10 RX ADMIN — CLOPIDOGREL BISULFATE 75 MG: 75 TABLET ORAL at 08:06

## 2022-05-10 RX ADMIN — Medication 1000 UNITS: at 08:06

## 2022-05-10 RX ADMIN — ASPIRIN 81 MG: 81 TABLET, COATED ORAL at 08:06

## 2022-05-10 RX ADMIN — DICLOFENAC SODIUM 2 G: 10 GEL TOPICAL at 08:08

## 2022-05-10 RX ADMIN — ATORVASTATIN CALCIUM 40 MG: 40 TABLET, FILM COATED ORAL at 20:14

## 2022-05-10 RX ADMIN — DICLOFENAC SODIUM 2 G: 10 GEL TOPICAL at 20:14

## 2022-05-10 RX ADMIN — OMEPRAZOLE 20 MG: 20 CAPSULE, DELAYED RELEASE ORAL at 08:06

## 2022-05-10 ASSESSMENT — GAIT ASSESSMENTS
DEVIATION: ANTALGIC;BRADYKINETIC;DECREASED HEEL STRIKE;DECREASED TOE OFF
ASSISTIVE DEVICE: FRONT WHEEL WALKER
DISTANCE (FEET): 240
GAIT LEVEL OF ASSIST: STANDBY ASSIST

## 2022-05-10 ASSESSMENT — ENCOUNTER SYMPTOMS
ABDOMINAL PAIN: 0
BRUISES/BLEEDS EASILY: 0
FOCAL WEAKNESS: 1
FEVER: 0
VOMITING: 0
SHORTNESS OF BREATH: 0
COUGH: 0
POLYDIPSIA: 0
EYES NEGATIVE: 1
NAUSEA: 0
PALPITATIONS: 0
CHILLS: 0

## 2022-05-10 ASSESSMENT — ACTIVITIES OF DAILY LIVING (ADL)
TUB_SHOWER_TRANSFER_DESCRIPTION: ADAPTIVE EQUIPMENT;INCREASED TIME
BED_CHAIR_WHEELCHAIR_TRANSFER_DESCRIPTION: ADAPTIVE EQUIPMENT;INCREASED TIME;SUPERVISION FOR SAFETY;VERBAL CUEING
TOILET_TRANSFER_DESCRIPTION: GRAB BAR;INCREASED TIME;VERBAL CUEING;SUPERVISION FOR SAFETY

## 2022-05-10 NOTE — PROGRESS NOTES
"Rehab Progress Note     Date of Service: 5/10/2022  Chief Complaint: Follow-up stroke    Interval Events (Subjective)    Patient seen and examined today in her room.   Her left sided sacral pain has improved with the ice and topical diclofenac gel.  We review the results of her xray which showed arthritis in her hips, but no sacral or coccyx fracture. It did also show constipation, and patient reports she moved her bowels this morning, after the xray was taken.  She has no new complaints today.      ROS: No changes to bowel, bladder, pain, mood, or sleep.     Objective:  VITAL SIGNS: /73   Pulse 69   Temp 36.4 °C (97.6 °F) (Temporal)   Resp 16   Ht 1.676 m (5' 6\")   Wt 59 kg (130 lb)   SpO2 97%   BMI 20.98 kg/m²   Gen: alert, no apparent distress  CV: Regular rate and rhythm  Resp: Clear to auscultation bilaterally  Neuro: mild left sided incoordination    Recent Results (from the past 72 hour(s))   Basic Metabolic Panel    Collection Time: 05/10/22  6:20 AM   Result Value Ref Range    Sodium 141 135 - 145 mmol/L    Potassium 4.5 3.6 - 5.5 mmol/L    Chloride 109 96 - 112 mmol/L    Co2 23 20 - 33 mmol/L    Glucose 89 65 - 99 mg/dL    Bun 28 (H) 8 - 22 mg/dL    Creatinine 1.13 0.50 - 1.40 mg/dL    Calcium 8.8 8.5 - 10.5 mg/dL    Anion Gap 9.0 7.0 - 16.0   proBrain Natriuretic Peptide, NT    Collection Time: 05/10/22  6:20 AM   Result Value Ref Range    NT-proBNP 1613 (H) 0 - 125 pg/mL   CBC WITH DIFFERENTIAL    Collection Time: 05/10/22  6:20 AM   Result Value Ref Range    WBC 8.6 4.8 - 10.8 K/uL    RBC 3.39 (L) 4.20 - 5.40 M/uL    Hemoglobin 10.4 (L) 12.0 - 16.0 g/dL    Hematocrit 32.0 (L) 37.0 - 47.0 %    MCV 94.4 81.4 - 97.8 fL    MCH 30.7 27.0 - 33.0 pg    MCHC 32.5 (L) 33.6 - 35.0 g/dL    RDW 51.6 (H) 35.9 - 50.0 fL    Platelet Count 283 164 - 446 K/uL    MPV 9.8 9.0 - 12.9 fL    Neutrophils-Polys 64.80 44.00 - 72.00 %    Lymphocytes 21.70 (L) 22.00 - 41.00 %    Monocytes 9.20 0.00 - 13.40 %    " Eosinophils 2.70 0.00 - 6.90 %    Basophils 0.30 0.00 - 1.80 %    Immature Granulocytes 1.30 (H) 0.00 - 0.90 %    Nucleated RBC 0.00 /100 WBC    Neutrophils (Absolute) 5.58 2.00 - 7.15 K/uL    Lymphs (Absolute) 1.87 1.00 - 4.80 K/uL    Monos (Absolute) 0.79 0.00 - 0.85 K/uL    Eos (Absolute) 0.23 0.00 - 0.51 K/uL    Baso (Absolute) 0.03 0.00 - 0.12 K/uL    Immature Granulocytes (abs) 0.11 0.00 - 0.11 K/uL    NRBC (Absolute) 0.00 K/uL   ESTIMATED GFR    Collection Time: 05/10/22  6:20 AM   Result Value Ref Range    GFR (CKD-EPI) 51 (A) >60 mL/min/1.73 m 2       Current Facility-Administered Medications   Medication Frequency   • diclofenac sodium (Voltaren) 1 % gel 2 g BID   • traMADol (ULTRAM) 50 MG tablet 50 mg Q4HRS PRN   • oxyCODONE immediate-release (ROXICODONE) tablet 5 mg Q4HRS PRN   • vitamin D3 (cholecalciferol) tablet 1,000 Units DAILY   • hydrOXYzine HCl (ATARAX) tablet 50 mg Q6HRS PRN   • melatonin tablet 3 mg HS PRN   • Respiratory Therapy Consult Continuous RT   • Pharmacy Consult Request ...Pain Management Review 1 Each PHARMACY TO DOSE   • hydrALAZINE (APRESOLINE) tablet 10 mg Q8HRS PRN   • acetaminophen (Tylenol) tablet 650 mg Q4HRS PRN   • senna-docusate (PERICOLACE or SENOKOT S) 8.6-50 MG per tablet 2 Tablet BID    And   • polyethylene glycol/lytes (MIRALAX) PACKET 1 Packet QDAY PRN    And   • magnesium hydroxide (MILK OF MAGNESIA) suspension 30 mL QDAY PRN    And   • bisacodyl (DULCOLAX) suppository 10 mg QDAY PRN   • lactulose 20 GM/30ML solution 30 mL QDAY PRN   • omeprazole (PRILOSEC) capsule 20 mg QAM AC   • artificial tears ophthalmic solution 1 Drop PRN   • benzocaine-menthol (Cepacol) lozenge 1 Lozenge Q2HRS PRN   • mag hydrox-al hydrox-simeth (MAALOX PLUS ES or MYLANTA DS) suspension 20 mL Q2HRS PRN   • ondansetron (ZOFRAN ODT) dispertab 4 mg 4X/DAY PRN    Or   • ondansetron (ZOFRAN) syringe/vial injection 4 mg 4X/DAY PRN   • traZODone (DESYREL) tablet 50 mg QHS PRN   • sodium chloride  (OCEAN) 0.65 % nasal spray 2 Spray PRN   • aspirin EC (ECOTRIN) tablet 81 mg DAILY   • clopidogrel (PLAVIX) tablet 75 mg DAILY   • midazolam (VERSED) 5 mg/mL (1 mL vial) PRN   • atorvastatin (LIPITOR) tablet 40 mg Q EVENING       Orders Placed This Encounter   Procedures   • Diet Order Diet: Cardiac     Standing Status:   Standing     Number of Occurrences:   1     Order Specific Question:   Diet:     Answer:   Cardiac [6]       Radiology    DX-SACRUM AND COCCYX 2+   Final Result      Decreased osseous mineralization without displaced fracture or dislocation. If there is concern for occult fracture, cross-sectional imaging can be obtained.      DX-HIP-COMPLETE - UNILATERAL 2+ LEFT   Final Result      No radiographic evidence of acute traumatic injury.      DX-CHEST-PORTABLE (1 VIEW)   Final Result         No acute cardiac or pulmonary abnormality is identified.            Assessment:    This patient is a 72 y.o. female admitted for acute inpatient rehabilitation with Ischemic stroke (HCC).    I led and attended the weekly conference, and agree with the IDT conference documentation and plan of care as noted below.    Date of conference: 5/4/2022    Goals and barriers: See IDT note.    Biggest barriers: left hemiparesis, knee hypertension, 3 stairs into home    CM/social support: family supportive    Anticipated DC date: 5/12    Home health: PT/OT/SLP/RN    Equip: FWW    Follow up: PCP, Dr. Wilfrid sim      Problem List/Medical Decision Making and Plan:    Right frontal parietal ischemic stroke  Left hemiparesis, improved  Left sided incoordination  Nondominant  Left neglect, improved  Cognitive impairment, improved  Continue full rehab program  PT/OT/SLP, 1 hr each discipline, 5 days per week  5/5: decrease SLP to 30 min, share other 30 min with PPT/OT    Aspirin  Plavix  Eliquis    Outpatient follow up with Dr. Wilfrid sim, referrals made    Consult Dr. Ortega for adjustment to disability,  appreciate assistance    Left hip pain  Status post fall 5/5, unwitnessed in bathroom  X-ray without any evidence of fracture    Left sacral pain, improved  Checked xray - negative for fracture  ICE  Diclofenac topical    Hypertension  Home medications included carvedilol 6.25 mg twice daily, lisinopril 20 mg daily, Lasix 20 mg daily, spironolactone 25 mg daily  Currently not requiring any medications for BP     Chronic systolic CHF  Ejection fraction of 35 to 40%  Elevated BNP, slightly increased since admission  Low-dose lisinopril, discontinued due to low BPs  Previously on Lasix 20 mg daily and spironolactone 25 mg daily - per review of notes appears to have been started on spironolactone by her [facility] cardiologist for hypokalemia.   Monitor for edema, currently none    Coronary artery disease  History of 3 coronary stents  Aspirin  Plavix  Statin  Eliquis  Outpatient follow up with cardiology at [facility]    History of pacemaker placement  Paced rhythm  Outpatient follow up with cardiology    GI prophylaxis  Omeprazole     Chronic kidney disease, stage III  Avoid nephrotoxins  Renally dose medications  Monitor with intermittent labs     COPD  Respiratory therapist evaluation  Not requiring any breathing treatment    Leukocytosis, resolved  Afebrile  Negative CXR  Positive UA    Positive urinalysis  Polyuria, stable  Culture negative     Peripheral vascular disease  History of stents in the bilateral iliac arteries  Aspirin  Plavix  Statin  Eliquis  Outpatient follow up with vascular surgery as needed     Sleep apnea  Did not tolerate CPAP     Anemia  Monitor with intermittent labs  Slightly decreased 116 --> 10.4    GI prophylaxis  Omeprazole    Vitamin D deficiency, 15  Continue supplementation    Bowel program  Constipation, resolved  Continue bowel medications  Last BM 5/10    Bladder program  Check PVRs - 1, 39  Not retaining    DVT prophylaxis  Eliquis      Total time:  26 minutes.  I spent  greater than 50% of the time for patient care, counseling, and coordination on this date, including patient face-to face time, unit/floor time with review of records/pertinent lab data and studies, as well as discussing diagnostic evaluation/work up, planned therapeutic interventions, and future disposition of care, as per the interval events/subjective and the assessment and plan as noted above.    I have performed a physical exam, reviewed and updated ROS, as well as the assessment and plan today 5/10/2022. In review of note from 5/9/2022 there are no new changes except as documented above.            Abida Boyce M.D.   Physical Medicine and Rehabilitation

## 2022-05-10 NOTE — PROGRESS NOTES
Hospital Medicine Daily Progress Note      Chief Complaint:  Hypertension  Leukocytosis  Chronic Kidney Disease    Interval History:  Pt reports doing better everyday.  Labs reviewed.     Review of Systems  Review of Systems   Constitutional: Negative for chills and fever.   HENT: Negative.    Eyes: Negative.    Respiratory: Negative for cough and shortness of breath.    Cardiovascular: Negative for chest pain and palpitations.   Gastrointestinal: Negative for abdominal pain, nausea and vomiting.   Skin: Negative for itching and rash.   Neurological: Positive for focal weakness.   Endo/Heme/Allergies: Negative for polydipsia. Does not bruise/bleed easily.        Physical Exam  Temp:  [36.6 °C (97.8 °F)-36.9 °C (98.5 °F)] 36.6 °C (97.8 °F)  Pulse:  [68-71] 70  Resp:  [16-17] 16  BP: (116-148)/(73-84) 141/82  SpO2:  [95 %-96 %] 95 %    Physical Exam  Vitals reviewed.   Constitutional:       General: She is not in acute distress.     Appearance: Normal appearance. She is not ill-appearing.   HENT:      Head: Normocephalic and atraumatic.      Right Ear: External ear normal.      Left Ear: External ear normal.      Nose: Nose normal.      Mouth/Throat:      Pharynx: Oropharynx is clear.   Eyes:      General:         Right eye: No discharge.         Left eye: No discharge.      Extraocular Movements: Extraocular movements intact.      Conjunctiva/sclera: Conjunctivae normal.   Cardiovascular:      Rate and Rhythm: Normal rate and regular rhythm.   Pulmonary:      Effort: Pulmonary effort is normal. No respiratory distress.      Breath sounds: Normal breath sounds. No wheezing.   Abdominal:      General: Bowel sounds are normal. There is no distension.      Palpations: Abdomen is soft.      Tenderness: There is no abdominal tenderness.   Musculoskeletal:      Cervical back: Normal range of motion and neck supple.      Right lower leg: Edema present.      Left lower leg: Edema present.   Skin:     General: Skin is warm and  dry.   Neurological:      Mental Status: She is alert and oriented to person, place, and time.         Fluids    Intake/Output Summary (Last 24 hours) at 5/10/2022 1102  Last data filed at 5/10/2022 0859  Gross per 24 hour   Intake 840 ml   Output --   Net 840 ml       Laboratory  Recent Labs     05/10/22  0620   WBC 8.6   RBC 3.39*   HEMOGLOBIN 10.4*   HEMATOCRIT 32.0*   MCV 94.4   MCH 30.7   MCHC 32.5*   RDW 51.6*   PLATELETCT 283   MPV 9.8     Recent Labs     05/10/22  0620   SODIUM 141   POTASSIUM 4.5   CHLORIDE 109   CO2 23   GLUCOSE 89   BUN 28*   CREATININE 1.13   CALCIUM 8.8               Assessment/Plan  * Ischemic stroke (HCC)- (present on admission)  Assessment & Plan  Had acute onset of dysarthria and L HP  On ASA, Plavix, and Lipitor    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)  Assessment & Plan  Echo 2/28/17 unable to view results in Epic  Echo 10/1/15 EF 45%, grade II DD, RVSP 55 mmHg  Consider F/U Echo  Cardiology F/U    Leukocytosis- (present on admission)  Assessment & Plan  UCx negative  CXR negative acute  WBC normalized w/o intervention    CKD (chronic kidney disease)- (present on admission)  Assessment & Plan  Reported h/o CKD Stage III  Avoid nephrotoxins  Renal dose all meds  Monitor electrolytes  Outpt Nephrology F/U    Vitamin D deficiency- (present on admission)  Assessment & Plan  Vit D level 15  On supplementation    Essential hypertension- (present on admission)  Assessment & Plan  Presently normotensive on no meds  PCP F/U    Full Code

## 2022-05-10 NOTE — CARE PLAN
The patient is Stable - Low risk of patient condition declining or worsening    Shift Goals  Clinical Goals: safety, pain control  Patient Goals: pain control, rest    Progress made toward(s) clinical / shift goals:    Problem: Knowledge Deficit - Standard  Goal: Patient and family/care givers will demonstrate understanding of plan of care, disease process/condition, diagnostic tests and medications  Outcome: Progressing     Problem: Fall Risk - Rehab  Goal: Patient will remain free from falls  Outcome: Progressing     Problem: Bladder / Voiding  Goal: Patient will establish and maintain regular urinary output  Outcome: Progressing

## 2022-05-10 NOTE — CARE PLAN
Problem: Fall Risk - Rehab  Goal: Patient will remain free from falls  Outcome: Progressing  Note: Ella Sandoval Fall risk Assessment Score: 9    Low fall risk interventions   - Call light within reach   - Yellow  socks   - Belongings within reach   - Bed in the lowest position      Problem: Skin Integrity  Goal: Patient's skin integrity will be maintained or improve  Outcome: Progressing  Note: Patient's skin remains intact and free from new or accidental injury this shift. Multiple bruises and scabs on arms remain. Will continue to monitor.

## 2022-05-10 NOTE — THERAPY
Occupational Therapy  Daily Treatment     Patient Name: Ashlee Sepulveda  Age:  72 y.o., Sex:  female  Medical Record #: [id number]  Today's Date: 5/10/2022     Precautions  Precautions: (P) Fall Risk  Comments: (P) L hemiparesis, WBAT LLE, hx of falls    Safety   ADL Safety : Requires Supervision for Safety  Bathroom Safety: Requires Supervision for Safety    Subjective    Pt agreeable to OT session.     Objective       05/10/22 1301   Precautions   Precautions Fall Risk   Comments L hemiparesis, WBAT LLE, hx of falls   Functional Level of Assist   Tub / Shower Transfers Standby Assist   Tub Shower Transfer Description Adaptive equipment;Increased time  (dry threshold shower txfr completed to simulate home set up using FWW and shower chair)   IADL Treatments   Kitchen Mobility Education Pt educated on safe kitchen mobility techniques from FWW level. Pt retrieved items from cupboards, countertop, appliances, etc at various heights with SBA. Educated pt on safety strategies including placement of FWW, use of countertop for UB support, keeping frequently used items within easy reach, completing portions of cooking tasks while seated for energy conservation.   Interdisciplinary Plan of Care Collaboration   Patient Position at End of Therapy In Bed;Bed Alarm On;Call Light within Reach;Tray Table within Reach;Phone within Reach   OT Total Time Spent   OT Individual Total Time Spent (Mins) 30   OT Charge Group   OT Self Care / ADL 1   OT Therapy Activity 1       Assessment    Pt demonstrated good carryover of safety strategies for kitchen mobility and threshold shower txfr activities. She will require use of FWW for safety with household mobility.   Strengths: Able to follow instructions,Effective communication skills,Good insight into deficits/needs,Independent prior level of function,Manages pain appropriately,Motivated for self care and independence,Pleasant and cooperative,Supportive family,Willingly participates in  therapeutic activities  Barriers: Decreased endurance,Generalized weakness,Hemiparesis,Home accessibility,Impaired activity tolerance,Impaired balance,Limited mobility (fragile skin)    Plan    FT tomorrow with spouse; D/C IRF Anne.     Passport items to be completed:   prepare a simple meal, participate in household tasks, adapt home for safety needs, complete caregiver training        Occupational Therapy Goals (Active)       Problem: Bathing       Dates: Start: 05/03/22         Goal: STG-Within one week, patient will bathe with Lan and use of AE as needed.       Dates: Start: 05/03/22   Expected End: 05/12/22         Goal Note filed on 05/04/22 0649 by Vinicius Kimble C.O.T.A.       Evaluated day prior to this documentation   Limited by decreased balance, decreased left UE function due to  hemiparesis/Decreased attention/ coordination   Continue goal                  Problem: Dressing       Dates: Start: 05/03/22         Goal: STG-Within one week, patient will dress LB with Lan and use of AE as needed.       Dates: Start: 05/03/22   Expected End: 05/12/22         Goal Note filed on 05/04/22 0649 by Vinicius Kimble C.O.T.A.       Evaluated day prior to this documentation   Limited by decreased balance, decreased left UE function due to  hemiparesis/Decreased attention/ coordination   Continue goal                  Problem: Functional Transfers       Dates: Start: 05/03/22         Goal: STG-Within one week, patient will transfer to toilet with CGA and use of AE as needed.       Dates: Start: 05/03/22   Expected End: 05/12/22         Goal Note filed on 05/04/22 0649 by Vinicius Kimble C.O.T.A.       Evaluated day prior to this documentation   Limited by decreased balance, decreased left UE function due to  hemiparesis/Decreased attention/ coordination   Continue goal                  Problem: OT Long Term Goals       Dates: Start: 05/03/22         Goal: LTG-By discharge, patient will complete basic self care tasks  with supervision to modified independence and use of AE as needed.       Dates: Start: 05/03/22   Expected End: 05/12/22            Goal: LTG-By discharge, patient will perform bathroom transfers with supervision to modified independence and use of AE as needed.       Dates: Start: 05/03/22   Expected End: 05/12/22               Problem: Toileting       Dates: Start: 05/03/22         Goal: STG-Within one week, patient will complete toileting tasks with Lan and use of AE as needed.       Dates: Start: 05/03/22   Expected End: 05/12/22         Goal Note filed on 05/04/22 0649 by Vinicius Kimble C.O.T.A.       Evaluated day prior to this documentation   Limited by decreased balance, decreased left UE function due to  hemiparesis/Decreased attention/ coordination   Continue goal

## 2022-05-10 NOTE — THERAPY
Physical Therapy   Daily Treatment     Patient Name: Ashlee Sepulveda  Age:  72 y.o., Sex:  female  Medical Record #: [id number]  Today's Date: 5/10/2022     Precautions  Precautions: Fall Risk  Comments: L hemiparesis, WBAT LLE, hx of falls    Subjective    Pt was seen [id number], [id number]; agreeable to both session.     Objective       05/10/22 0831   Pain 0 - 10 Group   Location Hip   Pain Rating Scale (NPRS) 2   Therapist Pain Assessment During Activity   Gait Functional Level of Assist    Gait Level Of Assist Standby Assist   Assistive Device Front Wheel Walker   Distance (Feet) 240   # of Times Distance was Traveled 2   Deviation Antalgic;Bradykinetic;Decreased Heel Strike;Decreased Toe Off  (cues for L foot clearance and hip abd during gait)   Wheelchair Functional Level of Assist   Wheelchair Assist Supervised   Distance Wheelchair (Feet or Distance) 150   Stairs Functional Level of Assist   Level of Assist with Stairs Contact Guard Assist   # of Stairs Climbed 12  (trial w/ RHR/LHR only, CGA throughout w/ cues for step-to)   Stairs Description Extra time;Hand rails;Supervision for safety;Verbal cueing   Transfer Functional Level of Assist   Bed, Chair, Wheelchair Transfer Standby Assist   Bed Chair Wheelchair Transfer Description Adaptive equipment;Increased time;Supervision for safety;Verbal cueing  (SPT w/ FWW)   Toilet Transfers Standby Assist   Toilet Transfer Description Grab bar;Increased time;Verbal cueing;Supervision for safety   Sitting Lower Body Exercises   Sit to Stand   (1x5)   Standing Lower Body Exercises   Hip Extension 1 set of 10;Bilateral    Hip Abduction 1 set of 10;Bilateral   Marching 1 set of 10   Heel Rise 1 set of 10;Bilateral   Toe Rise 1 set of 10;Bilateral   Mini Squat Partial;1 set of 10   Bed Mobility    Sit to Stand Standby Assist   Neuro-Muscular Treatments   Neuro-Muscular Treatments Anterior weight shift;Postural Changes;Verbal Cuing;Weight Shift Right;Weight Shift Left    Comments lateral WS w/ cues for not hyperext L knee during stance x20, L/R tapping forward x 10   Interdisciplinary Plan of Care Collaboration   IDT Collaboration with  Occupational Therapist   Patient Position at End of Therapy Seated;Call Light within Reach;Tray Table within Reach;Phone within Reach   Collaboration Comments CLOF   PT Total Time Spent   PT Individual Total Time Spent (Mins) 90   PT Charge Group   PT Gait Training 2   PT Therapeutic Exercise 2   PT Neuromuscular Re-Education / Balance 1   PT Therapeutic Activities 1   Supervising Physical Therapist Cayla Fernández     [id number]  Amb w/ FWW, set up FT for tomorrow, w/c mobility as coordination for UE/LE.    [id number]  Standing therex in // bars, stairs w/ BHR/RHR/LHR, amb w/ FWW, STS.    Assessment    Pt tolerated both session well, increased in ambulation distance but still required cues for widen MERLIN, L foot clearance. Pt stated she's felling ready for d/c and will have FT w/ SO tomorrow afternoon. L hip pain 2/10 during amb/activity but bit interfering w/ movement.    Strengths: Able to follow instructions,Adequate strength,Good insight into deficits/needs,Independent prior level of function,Good carryover of learning,Motivated for self care and independence,Pleasant and cooperative,Supportive family,Willingly participates in therapeutic activities  Barriers: Decreased endurance,Hemiparesis,Impaired balance    Plan    FT tomorrow w/ SO- car xfer, stairs w/ RHR/LHR, ambulation ww/ FWW.    Floor xfer.    Passport items to be completed:  show how to get up/down from the ground, complete caregiver training     Physical Therapy Problems (Active)       Problem: Balance       Dates: Start: 05/03/22         Goal: STG-Within one week, patient will maintain dynamic standing perform [place]       Dates: Start: 05/03/22   Expected End: 05/12/22         Goal Note filed on 05/04/22 0824 by Karol Carlson PTA       PT kristine performed yesterday                 Problem:  "Mobility       Dates: Start: 05/03/22         Goal: STG-Within one week, patient will ambulate household distance with FWW x150 CGA with improved posture/L knee stability       Dates: Start: 05/03/22   Expected End: 05/12/22         Goal Note filed on 05/04/22 0824 by Karol Carlson PTA       PT eval performed yesterday              Goal: STG-Within one week, patient will ascend and descend four to six stairs 1 HR, 6\" steps with Lan       Dates: Start: 05/03/22   Expected End: 05/12/22         Goal Note filed on 05/04/22 0824 by Karol Carlson PTA       PT eval performed yesterday                 Problem: Mobility Transfers       Dates: Start: 05/03/22         Goal: STG-Within one week, patient will transfer bed to chair CGA SPT       Dates: Start: 05/03/22   Expected End: 05/12/22         Goal Note filed on 05/04/22 0824 by Karol Carlson PTA       PT eval performed yesterday                 Problem: PT-Long Term Goals       Dates: Start: 05/03/22         Goal: LTG-By discharge, patient will ambulate with FWW x100' indoors SPV       Dates: Start: 05/03/22   Expected End: 05/12/22            Goal: LTG-By discharge, patient will transfer one surface to another Magen w/c<>bed       Dates: Start: 05/03/22   Expected End: 05/12/22            Goal: LTG-By discharge, patient will perform home exercise program with set up       Dates: Start: 05/03/22   Expected End: 05/12/22            Goal: LTG-By discharge, patient will ambulate up/down 4-6 stairs SPV with 1 HR       Dates: Start: 05/03/22   Expected End: 05/12/22              "

## 2022-05-10 NOTE — THERAPY
Occupational Therapy  Daily Treatment     Patient Name: Ashlee Sepulveda  Age:  72 y.o., Sex:  female  Medical Record #: [id number]  Today's Date: 5/10/2022     Precautions  Precautions: (P) Fall Risk  Comments: (P) L hemiparesis, WBAT LLE, hx of falls    Safety   ADL Safety : Requires Supervision for Safety  Bathroom Safety: Requires Supervision for Safety    Subjective    Pt received in therapy gym, agreeable to OT session.     Objective       05/10/22 0901   Precautions   Precautions Fall Risk   Comments L hemiparesis, WBAT LLE, hx of falls   Fine Motor / Dexterity    Fine Motor / Dexterity Yes   Fine Motor / Dexterity Interventions see note for tx details.   Sitting Lower Body Exercises   Nustep Resistance Level 4  (For reciprocal patterning/endurance x10 min, 0 RB)   Interdisciplinary Plan of Care Collaboration   IDT Collaboration with  Physician;Physical Therapist Assistant (PTA)   Patient Position at End of Therapy Seated;Chair Alarm On;Self Releasing Lap Belt Applied;Call Light within Reach;Tray Table within Reach;Phone within Reach   Collaboration Comments CLOF, POC   OT Total Time Spent   OT Individual Total Time Spent (Mins) 60   OT Charge Group   OT Therapy Activity 3   OT Therapeutic Exercise  1     Activities at tabletop for LUE coordination, educated pt on CVA recovery, importance of repetition and use of meaningful/functional activities. Provided pt with [facility] handout.  -folding washcloths x20 reps  -placement/removal of graded clothespins on vertical rung x20 reps, able to complete yellow, red, green and blue resistance levels.  -holding card deck in R hand while flipping cards onto tabletop using L hand x52 reps  -placement of marbles in top of cone using tip pinch x40 reps    Reviewed Passport to [place], rehab goals/progress, plan for FT tomorrow.     Assessment    Pt progressing with LUE functional use, and able to complete all activities noted above with increased time.   Strengths: Able to  follow instructions,Effective communication skills,Good insight into deficits/needs,Independent prior level of function,Manages pain appropriately,Motivated for self care and independence,Pleasant and cooperative,Supportive family,Willingly participates in therapeutic activities  Barriers: Decreased endurance,Generalized weakness,Hemiparesis,Home accessibility,Impaired activity tolerance,Impaired balance,Limited mobility (fragile skin)    Plan    Kitchen mobility/IADLs, FT tomorrow with spouse; D/C IRF Anne.    Passport items to be completed:   prepare a simple meal, participate in household tasks, adapt home for safety needs, complete caregiver training     Occupational Therapy Goals (Active)       Problem: Bathing       Dates: Start: 05/03/22         Goal: STG-Within one week, patient will bathe with Lan and use of AE as needed.       Dates: Start: 05/03/22   Expected End: 05/12/22         Goal Note filed on 05/04/22 0649 by Vinicius Kimble C.O.T.A.       Evaluated day prior to this documentation   Limited by decreased balance, decreased left UE function due to  hemiparesis/Decreased attention/ coordination   Continue goal                  Problem: Dressing       Dates: Start: 05/03/22         Goal: STG-Within one week, patient will dress LB with Lan and use of AE as needed.       Dates: Start: 05/03/22   Expected End: 05/12/22         Goal Note filed on 05/04/22 0649 by Vinicius Kimble C.O.T.A.       Evaluated day prior to this documentation   Limited by decreased balance, decreased left UE function due to  hemiparesis/Decreased attention/ coordination   Continue goal                  Problem: Functional Transfers       Dates: Start: 05/03/22         Goal: STG-Within one week, patient will transfer to toilet with CGA and use of AE as needed.       Dates: Start: 05/03/22   Expected End: 05/12/22         Goal Note filed on 05/04/22 0649 by Vinicius Kimble, C.O.T.A.       Evaluated day prior to this documentation    Limited by decreased balance, decreased left UE function due to  hemiparesis/Decreased attention/ coordination   Continue goal                  Problem: OT Long Term Goals       Dates: Start: 05/03/22         Goal: LTG-By discharge, patient will complete basic self care tasks with supervision to modified independence and use of AE as needed.       Dates: Start: 05/03/22   Expected End: 05/12/22            Goal: LTG-By discharge, patient will perform bathroom transfers with supervision to modified independence and use of AE as needed.       Dates: Start: 05/03/22   Expected End: 05/12/22               Problem: Toileting       Dates: Start: 05/03/22         Goal: STG-Within one week, patient will complete toileting tasks with Lan and use of AE as needed.       Dates: Start: 05/03/22   Expected End: 05/12/22         Goal Note filed on 05/04/22 0649 by [email or web address]       Evaluated day prior to this documentation   Limited by decreased balance, decreased left UE function due to  hemiparesis/Decreased attention/ coordination   Continue goal

## 2022-05-11 PROBLEM — E87.5 HIGH POTASSIUM: Status: RESOLVED | Noted: 2022-05-03 | Resolved: 2022-05-11

## 2022-05-11 PROBLEM — D72.829 LEUKOCYTOSIS: Status: RESOLVED | Noted: 2022-05-03 | Resolved: 2022-05-11

## 2022-05-11 PROCEDURE — A9270 NON-COVERED ITEM OR SERVICE: HCPCS | Performed by: PHYSICAL MEDICINE & REHABILITATION

## 2022-05-11 PROCEDURE — 99231 SBSQ HOSP IP/OBS SF/LOW 25: CPT | Performed by: HOSPITALIST

## 2022-05-11 PROCEDURE — 97535 SELF CARE MNGMENT TRAINING: CPT

## 2022-05-11 PROCEDURE — 770010 HCHG ROOM/CARE - REHAB SEMI PRIVAT*

## 2022-05-11 PROCEDURE — 97530 THERAPEUTIC ACTIVITIES: CPT | Mod: CO

## 2022-05-11 PROCEDURE — 700102 HCHG RX REV CODE 250 W/ 637 OVERRIDE(OP): Performed by: HOSPITALIST

## 2022-05-11 PROCEDURE — 97116 GAIT TRAINING THERAPY: CPT | Mod: CQ

## 2022-05-11 PROCEDURE — 97530 THERAPEUTIC ACTIVITIES: CPT | Mod: CQ

## 2022-05-11 PROCEDURE — A9270 NON-COVERED ITEM OR SERVICE: HCPCS | Performed by: HOSPITALIST

## 2022-05-11 PROCEDURE — 97112 NEUROMUSCULAR REEDUCATION: CPT | Mod: CQ

## 2022-05-11 PROCEDURE — 97110 THERAPEUTIC EXERCISES: CPT | Mod: CQ

## 2022-05-11 PROCEDURE — 99233 SBSQ HOSP IP/OBS HIGH 50: CPT | Performed by: PHYSICAL MEDICINE & REHABILITATION

## 2022-05-11 PROCEDURE — 700102 HCHG RX REV CODE 250 W/ 637 OVERRIDE(OP): Performed by: PHYSICAL MEDICINE & REHABILITATION

## 2022-05-11 RX ORDER — CLOPIDOGREL BISULFATE 75 MG/1
75 TABLET ORAL DAILY
Qty: 30 TABLET | Refills: 0 | Status: ON HOLD | OUTPATIENT
Start: 2022-05-11 | End: 2023-08-13

## 2022-05-11 RX ORDER — ACETAMINOPHEN 325 MG/1
650 TABLET ORAL EVERY 4 HOURS PRN
Qty: 30 TABLET | Refills: 0 | Status: ON HOLD | COMMUNITY
Start: 2022-05-11 | End: 2023-08-13

## 2022-05-11 RX ORDER — ATORVASTATIN CALCIUM 40 MG/1
40 TABLET, FILM COATED ORAL EVERY EVENING
Qty: 30 TABLET | Refills: 0 | Status: SHIPPED | OUTPATIENT
Start: 2022-05-11

## 2022-05-11 RX ADMIN — DICLOFENAC SODIUM 2 G: 10 GEL TOPICAL at 20:36

## 2022-05-11 RX ADMIN — ASPIRIN 81 MG: 81 TABLET, COATED ORAL at 08:06

## 2022-05-11 RX ADMIN — DICLOFENAC SODIUM 2 G: 10 GEL TOPICAL at 08:07

## 2022-05-11 RX ADMIN — Medication 1000 UNITS: at 08:05

## 2022-05-11 RX ADMIN — ATORVASTATIN CALCIUM 40 MG: 40 TABLET, FILM COATED ORAL at 20:32

## 2022-05-11 RX ADMIN — CLOPIDOGREL BISULFATE 75 MG: 75 TABLET ORAL at 08:06

## 2022-05-11 RX ADMIN — OMEPRAZOLE 20 MG: 20 CAPSULE, DELAYED RELEASE ORAL at 08:06

## 2022-05-11 RX ADMIN — ACETAMINOPHEN 650 MG: 325 TABLET ORAL at 09:00

## 2022-05-11 ASSESSMENT — GAIT ASSESSMENTS
GAIT LEVEL OF ASSIST: STANDBY ASSIST
DEVIATION: BRADYKINETIC;DECREASED HEEL STRIKE;DECREASED TOE OFF
DISTANCE (FEET): 300
ASSISTIVE DEVICE: FRONT WHEEL WALKER
DEVIATION: BRADYKINETIC;DECREASED HEEL STRIKE;DECREASED TOE OFF
DEVIATION: ANTALGIC;BRADYKINETIC;DECREASED HEEL STRIKE;DECREASED TOE OFF
ASSISTIVE DEVICE: FRONT WHEEL WALKER
GAIT LEVEL OF ASSIST: STANDBY ASSIST
ASSISTIVE DEVICE: FRONT WHEEL WALKER
GAIT LEVEL OF ASSIST: STANDBY ASSIST
DISTANCE (FEET): 60

## 2022-05-11 ASSESSMENT — BALANCE ASSESSMENTS
STANDING UNSUPPORTED WITH EYES CLOSED: 3
STANDING UNSUPPORTED WITH FEET TOGETHER: 3
STANDING UNSUPPORTED: 3
STANDING TO SITTING: 3
STANDING ON ONE LEG: 0
PICK UP OBJECT FROM THE FLOOR FROM A STANDING POSITION: 3
SITTING TO STANDING: 3
SITTING UNSUPPORTED: 4
STANDING UNSUPPORTED ONE FOOT IN FRONT: 2
TURN 360 DEGREES: 2
LONG VERSION TOTAL SCORE (MAX 56): 34
LONG VERSION TOTAL SCORE (MAX 56): 34
TRANSFERS: 3
LOOK OVER LEFT AND RIGHT SHOULDERS WHILE STANDING: 3
REACHING FORWARD WITH OUTSTRETCHED ARM WHILE STANDING: 1
PLACE ALTERNATE FOOT ON STEP OR STOOL WHILE STANDING UNSUPPORTED: 1

## 2022-05-11 ASSESSMENT — ACTIVITIES OF DAILY LIVING (ADL)
BED_CHAIR_WHEELCHAIR_TRANSFER_DESCRIPTION: ADAPTIVE EQUIPMENT;INCREASED TIME;SUPERVISION FOR SAFETY
TOILET_TRANSFER_DESCRIPTION: ADAPTIVE EQUIPMENT;GRAB BAR;INCREASED TIME;SUPERVISION FOR SAFETY
SHOWER_TRANSFER_LEVEL_OF_ASSIST: REQUIRES SUPERVISION WITH SHOWER TRANSFER
BED_CHAIR_WHEELCHAIR_TRANSFER_DESCRIPTION: ADAPTIVE EQUIPMENT;INCREASED TIME;SET-UP OF EQUIPMENT;SUPERVISION FOR SAFETY;VERBAL CUEING
TOILETING_LEVEL_OF_ASSIST_DESCRIPTION: GRAB BAR;INCREASED TIME;SUPERVISION FOR SAFETY
BED_CHAIR_WHEELCHAIR_TRANSFER_DESCRIPTION: ADAPTIVE EQUIPMENT;INCREASED TIME;SUPERVISION FOR SAFETY
TOILET_TRANSFER_LEVEL_OF_ASSIST: REQUIRES SUPERVISION WITH TOILET TRANSFER
TOILETING_LEVEL_OF_ASSIST: REQUIRES SUPERVISION WITH TOILETING

## 2022-05-11 ASSESSMENT — ENCOUNTER SYMPTOMS
BRUISES/BLEEDS EASILY: 0
NAUSEA: 0
FOCAL WEAKNESS: 1
FEVER: 0
EYES NEGATIVE: 1
SHORTNESS OF BREATH: 0
COUGH: 0
POLYDIPSIA: 0
CHILLS: 0
ABDOMINAL PAIN: 0
VOMITING: 0
PALPITATIONS: 0

## 2022-05-11 NOTE — CARE PLAN
Problem: Bathing  Goal: STG-Within one week, patient will bathe with Lan and use of AE as needed.  Outcome: Met     Problem: Dressing  Goal: STG-Within one week, patient will dress LB with Lan and use of AE as needed.  Outcome: Met     Problem: Toileting  Goal: STG-Within one week, patient will complete toileting tasks with Lan and use of AE as needed.  Outcome: Met     Problem: Functional Transfers  Goal: STG-Within one week, patient will transfer to toilet with CGA and use of AE as needed.  Outcome: Met     Problem: OT Long Term Goals  Goal: LTG-By discharge, patient will perform bathroom transfers with supervision to modified independence and use of AE as needed.  Outcome: Met     Problem: OT Long Term Goals  Goal: LTG-By discharge, patient will complete basic self care tasks with supervision to modified independence and use of AE as needed.  Outcome: Discharged - Not Met  Note: Requires min A for footwear mgt; supv to mod I for other ADLs.

## 2022-05-11 NOTE — CARE PLAN
"  Problem: Fall Risk - Rehab  Goal: Patient will remain free from falls  5/10/2022 2146 by Gloria Neal R.N.  Outcome: Progressing  Note: Ella Sandoval Fall risk Assessment Score: 11    Moderate fall risk Interventions  - Bed and strip alarm   - Yellow sign by the door   - Yellow wrist band \"Fall risk\"  - Room near to the nurse station  - Do not leave patient unattended in the bathroom  - Fall risk education provided       Problem: Bladder / Voiding  Goal: Patient will establish and maintain regular urinary output  Outcome: Progressing  Note: Oob to bathroom with assist voiding freely.     Problem: Bowel Elimination  Goal: Patient will participate in bowel management program  Outcome: Progressing  Note: Had bm ,refused senna.     Problem: Pain - Standard  Goal: Alleviation of pain or a reduction in pain to the patient’s comfort goal  Outcome: Progressing  Note: Assessed for pain and discomfort, denies pain, pain under control, encouraged to request pain med a s needed.Needs anticipated and attended.   The patient is Stable - Low risk of patient condition declining or worsening    Shift Goals  Clinical Goals: safety, pain control  Patient Goals: pain control, rest    Progress made toward(s) clinical / shift goals:  Pain under control.    Patient is not progressing towards the following goals: Left side weakness.      "

## 2022-05-11 NOTE — THERAPY
Occupational Therapy  Daily Treatment     Patient Name: Ashlee Sepulveda  Age:  72 y.o., Sex:  female  Medical Record #: [id number]  Today's Date: 5/11/2022     Precautions  Precautions: Fall Risk  Comments: L hemiparesis, WBAT LLE, hx of falls    Safety   ADL Safety : Requires Supervision for Safety  Bathroom Safety: Requires Supervision for Safety    Subjective    Pt agreeable to OT session; she is looking forward to d/c tomorrow.     Objective       05/11/22 0701   Precautions   Precautions Fall Risk   Comments L hemiparesis, WBAT LLE, hx of falls   Functional Level of Assist   Eating Modified Independent   Eating Description Increased time   Grooming Modified Independent;Seated   Grooming Description Increased time   Bathing Supervision   Bathing Description Grab bar;Hand held shower;Tub bench;Increased time;Supervision for safety   Upper Body Dressing Modified Independent   Upper Body Dressing Description Increased time   Lower Body Dressing Minimal Assist   Lower Body Dressing Description Increased time;Supervision for safety  (assistance to don socks)   Toileting Supervision   Toileting Description Grab bar;Increased time;Supervision for safety   Toilet Transfers Standby Assist   Toilet Transfer Description Adaptive equipment;Grab bar;Increased time;Supervision for safety   Tub / Shower Transfers Standby Assist   Tub Shower Transfer Description Grab bar;Shower bench;Adaptive equipment;Increased time;Set-up of equipment;Supervision for safety   Interdisciplinary Plan of Care Collaboration   Patient Position at End of Therapy Seated;Chair Alarm On;Self Releasing Lap Belt Applied;Call Light within Reach;Tray Table within Reach;Phone within Reach   OT Total Time Spent   OT Individual Total Time Spent (Mins) 60   OT Charge Group   OT Self Care / ADL 4       Assessment    Pt progressing with ADLs and bathroom transfers. She con't to require assistance with donning socks (reports that she needed help with this at  baseline), and SBA for safety with  Bathroom transfers. She would benefit from having GB installed in her shower at home.   Strengths: Able to follow instructions, Effective communication skills, Good insight into deficits/needs, Independent prior level of function, Manages pain appropriately, Motivated for self care and independence, Pleasant and cooperative, Supportive family, Willingly participates in therapeutic activities  Barriers: Decreased endurance, Generalized weakness, Hemiparesis, Home accessibility, Impaired activity tolerance, Impaired balance, Limited mobility (fragile skin)    Plan    FT with spouse this afternoon, d/c tomorrow.     Passport items to be completed:  adapt home for safety needs, complete caregiver training     Occupational Therapy Goals (Active)       Problem: Bathing       Dates: Start: 05/03/22         Goal: STG-Within one week, patient will bathe with Lan and use of AE as needed.       Dates: Start: 05/03/22   Expected End: 05/12/22         Goal Note filed on 05/04/22 0649 by Vinicius Kimble, C.O.T.A.       Evaluated day prior to this documentation   Limited by decreased balance, decreased left UE function due to  hemiparesis/Decreased attention/ coordination   Continue goal                  Problem: Dressing       Dates: Start: 05/03/22         Goal: STG-Within one week, patient will dress LB with Lan and use of AE as needed.       Dates: Start: 05/03/22   Expected End: 05/12/22         Goal Note filed on 05/04/22 0649 by Vinicius Kimble, C.O.T.A.       Evaluated day prior to this documentation   Limited by decreased balance, decreased left UE function due to  hemiparesis/Decreased attention/ coordination   Continue goal                  Problem: Functional Transfers       Dates: Start: 05/03/22         Goal: STG-Within one week, patient will transfer to toilet with CGA and use of AE as needed.       Dates: Start: 05/03/22   Expected End: 05/12/22         Goal Note filed on  05/04/22 0649 by FROY Redd.O.T.MARITZA.       Evaluated day prior to this documentation   Limited by decreased balance, decreased left UE function due to  hemiparesis/Decreased attention/ coordination   Continue goal                  Problem: OT Long Term Goals       Dates: Start: 05/03/22         Goal: LTG-By discharge, patient will complete basic self care tasks with supervision to modified independence and use of AE as needed.       Dates: Start: 05/03/22   Expected End: 05/12/22            Goal: LTG-By discharge, patient will perform bathroom transfers with supervision to modified independence and use of AE as needed.       Dates: Start: 05/03/22   Expected End: 05/12/22               Problem: Toileting       Dates: Start: 05/03/22         Goal: STG-Within one week, patient will complete toileting tasks with Lan and use of AE as needed.       Dates: Start: 05/03/22   Expected End: 05/12/22         Goal Note filed on 05/04/22 0649 by FROY Redd.O.T.MARITZA.       Evaluated day prior to this documentation   Limited by decreased balance, decreased left UE function due to  hemiparesis/Decreased attention/ coordination   Continue goal

## 2022-05-11 NOTE — THERAPY
Physical Therapy   Daily Treatment     Patient Name: Ashlee Sepulveda  Age:  72 y.o., Sex:  female  Medical Record #: [id number]  Today's Date: 5/11/2022     Precautions  Precautions: Fall Risk  Comments: L hemiparesis, WBAT LLE, hx of falls    Subjective    Pt seated in w/c finishing breakfast upon arrival, agreeable to session.     Objective       05/11/22 0831   Pain 0 - 10 Group   Location Hip   Pain Rating Scale (NPRS) 4   Therapist Pain Assessment During Activity;Nurse Notified   Cognition    Level of Consciousness Alert   Gait Functional Level of Assist    Gait Level Of Assist Standby Assist   Assistive Device Front Wheel Walker   Distance (Feet) 300   # of Times Distance was Traveled 1   Deviation Antalgic;Bradykinetic;Decreased Heel Strike;Decreased Toe Off   Transfer Functional Level of Assist   Bed, Chair, Wheelchair Transfer Standby Assist   Bed Chair Wheelchair Transfer Description Adaptive equipment;Increased time;Set-up of equipment;Supervision for safety;Verbal cueing   Bed Mobility    Sit to Stand Standby Assist   Interdisciplinary Plan of Care Collaboration   IDT Collaboration with  Nursing   Patient Position at End of Therapy Seated;Call Light within Reach;Tray Table within Reach;Phone within Reach   Collaboration Comments re:pain   PT Total Time Spent   PT Individual Total Time Spent (Mins) 30   PT Charge Group   PT Gait Training 1   PT Therapeutic Activities 1   Supervising Physical Therapist Stephanie Petersen     Completed education on floor recovery    Assessment    Pt tolerated session well, therapist demonstrated floor recovery and pt stated not able to perform d/t h/o B knee. Will have FT w/ SO this afternoon.     Strengths: Able to follow instructions, Adequate strength, Good insight into deficits/needs, Independent prior level of function, Good carryover of learning, Motivated for self care and independence, Pleasant and cooperative, Supportive family, Willingly participates in therapeutic  activities  Barriers: Decreased endurance, Hemiparesis, Impaired balance    Plan    D/c tomorrow 5/12/22    Passport items to be completed:  Completed    Physical Therapy Problems (Active)       Problem: Balance       Dates: Start: 05/03/22         Goal: STG-Within one week, patient will maintain dynamic standing perform [place]       Dates: Start: 05/03/22   Expected End: 05/12/22         Goal Note filed on 05/04/22 0824 by Karol Carlson PTA       PT eval performed yesterday                 Problem: PT-Long Term Goals       Dates: Start: 05/03/22         Goal: LTG-By discharge, patient will ambulate with FWW x100' indoors SPV       Dates: Start: 05/03/22   Expected End: 05/12/22            Goal: LTG-By discharge, patient will transfer one surface to another Magen w/c<>bed       Dates: Start: 05/03/22   Expected End: 05/12/22            Goal: LTG-By discharge, patient will perform home exercise program with set up       Dates: Start: 05/03/22   Expected End: 05/12/22            Goal: LTG-By discharge, patient will ambulate up/down 4-6 stairs SPV with 1 HR       Dates: Start: 05/03/22   Expected End: 05/12/22

## 2022-05-11 NOTE — PROGRESS NOTES
Hospital Medicine Daily Progress Note      Chief Complaint:  Hypertension  Leukocytosis  Chronic Kidney Disease    Interval History:  Pt denies new complaints.    Review of Systems  Review of Systems   Constitutional: Negative for chills and fever.   HENT: Negative.    Eyes: Negative.    Respiratory: Negative for cough and shortness of breath.    Cardiovascular: Negative for chest pain and palpitations.   Gastrointestinal: Negative for abdominal pain, nausea and vomiting.   Skin: Negative for itching and rash.   Neurological: Positive for focal weakness.   Endo/Heme/Allergies: Negative for polydipsia. Does not bruise/bleed easily.        Physical Exam  Temp:  [36.1 °C (97 °F)-36.4 °C (97.6 °F)] 36.2 °C (97.1 °F)  Pulse:  [66-72] 70  Resp:  [16-20] 16  BP: (104-120)/(63-73) 109/69  SpO2:  [97 %] 97 %    Physical Exam  Vitals reviewed.   Constitutional:       General: She is not in acute distress.     Appearance: Normal appearance. She is not ill-appearing.   HENT:      Head: Normocephalic and atraumatic.      Right Ear: External ear normal.      Left Ear: External ear normal.      Nose: Nose normal.      Mouth/Throat:      Pharynx: Oropharynx is clear.   Eyes:      General:         Right eye: No discharge.         Left eye: No discharge.      Extraocular Movements: Extraocular movements intact.      Conjunctiva/sclera: Conjunctivae normal.   Cardiovascular:      Rate and Rhythm: Normal rate and regular rhythm.   Pulmonary:      Effort: Pulmonary effort is normal. No respiratory distress.      Breath sounds: Normal breath sounds. No wheezing.   Abdominal:      General: Bowel sounds are normal. There is no distension.      Palpations: Abdomen is soft.      Tenderness: There is no abdominal tenderness.   Musculoskeletal:      Cervical back: Normal range of motion and neck supple.      Right lower leg: No edema.      Left lower leg: No edema.   Skin:     General: Skin is warm and dry.   Neurological:      Mental Status:  She is alert and oriented to person, place, and time.         Fluids    Intake/Output Summary (Last 24 hours) at 5/11/2022 0944  Last data filed at 5/10/2022 2007  Gross per 24 hour   Intake 1120 ml   Output --   Net 1120 ml       Laboratory  Recent Labs     05/10/22  0620   WBC 8.6   RBC 3.39*   HEMOGLOBIN 10.4*   HEMATOCRIT 32.0*   MCV 94.4   MCH 30.7   MCHC 32.5*   RDW 51.6*   PLATELETCT 283   MPV 9.8     Recent Labs     05/10/22  0620   SODIUM 141   POTASSIUM 4.5   CHLORIDE 109   CO2 23   GLUCOSE 89   BUN 28*   CREATININE 1.13   CALCIUM 8.8               Assessment/Plan  * Ischemic stroke (HCC)- (present on admission)  Assessment & Plan  Had acute onset of dysarthria and L HP  On ASA, Plavix, and Lipitor    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)  Assessment & Plan  Echo 2/28/17 unable to view results in Epic  Echo 10/1/15 EF 45%, grade II DD, RVSP 55 mmHg  Consider F/U Echo  Cardiology F/U    Leukocytosis- (present on admission)  Assessment & Plan  UCx negative  CXR negative acute  WBC normalized w/o intervention    CKD (chronic kidney disease)- (present on admission)  Assessment & Plan  Reported h/o CKD Stage III  Avoid nephrotoxins  Renal dose all meds  Monitor electrolytes  Outpt Nephrology F/U    Vitamin D deficiency- (present on admission)  Assessment & Plan  Vit D level 15  On supplementation    Essential hypertension- (present on admission)  Assessment & Plan  Presently normotensive on no meds  PCP F/U    Full Code    Patient seen and examined.  Chart reviewed.  Assessment and Plan as above.  No changes today from yesterday's medical decision making.

## 2022-05-11 NOTE — CARE PLAN
Problem: PT-Long Term Goals  Goal: LTG-By discharge, patient will ambulate with FWW x100' indoors SPV  Outcome: Not Met  Goal: LTG-By discharge, patient will transfer one surface to another Magen w/c<>bed  Outcome: Not Met  Goal: LTG-By discharge, patient will ambulate up/down 4-6 stairs SPV with 1 HR  Outcome: Not Met     Problem: PT-Long Term Goals  Goal: LTG-By discharge, patient will perform home exercise program with set up  Outcome: Met

## 2022-05-11 NOTE — PROGRESS NOTES
"Rehab Progress Note     Date of Service: 5/11/2022  Chief Complaint: Follow-up stroke    Interval Events (Subjective)    Patient seen and examined today in her room.  Pain in her coccyx and sacrum has improved.  She has no new concerns and is looking forward to discharge home tomorrow.  Her  is coming in for family training this afternoon.      ROS: No changes to bowel, bladder, pain, mood, or sleep.       Objective:  VITAL SIGNS: /69   Pulse 70   Temp 36.2 °C (97.1 °F) (Oral)   Resp 16   Ht 1.676 m (5' 6\")   Wt 59 kg (130 lb)   SpO2 97%   BMI 20.98 kg/m²   Gen: alert, no apparent distress  CV: Regular rate and rhythm  Resp: Clear to auscultation bilaterally  Neuro: Very mild left-sided incoordination    Recent Results (from the past 72 hour(s))   Basic Metabolic Panel    Collection Time: 05/10/22  6:20 AM   Result Value Ref Range    Sodium 141 135 - 145 mmol/L    Potassium 4.5 3.6 - 5.5 mmol/L    Chloride 109 96 - 112 mmol/L    Co2 23 20 - 33 mmol/L    Glucose 89 65 - 99 mg/dL    Bun 28 (H) 8 - 22 mg/dL    Creatinine 1.13 0.50 - 1.40 mg/dL    Calcium 8.8 8.5 - 10.5 mg/dL    Anion Gap 9.0 7.0 - 16.0   proBrain Natriuretic Peptide, NT    Collection Time: 05/10/22  6:20 AM   Result Value Ref Range    NT-proBNP 1613 (H) 0 - 125 pg/mL   CBC WITH DIFFERENTIAL    Collection Time: 05/10/22  6:20 AM   Result Value Ref Range    WBC 8.6 4.8 - 10.8 K/uL    RBC 3.39 (L) 4.20 - 5.40 M/uL    Hemoglobin 10.4 (L) 12.0 - 16.0 g/dL    Hematocrit 32.0 (L) 37.0 - 47.0 %    MCV 94.4 81.4 - 97.8 fL    MCH 30.7 27.0 - 33.0 pg    MCHC 32.5 (L) 33.6 - 35.0 g/dL    RDW 51.6 (H) 35.9 - 50.0 fL    Platelet Count 283 164 - 446 K/uL    MPV 9.8 9.0 - 12.9 fL    Neutrophils-Polys 64.80 44.00 - 72.00 %    Lymphocytes 21.70 (L) 22.00 - 41.00 %    Monocytes 9.20 0.00 - 13.40 %    Eosinophils 2.70 0.00 - 6.90 %    Basophils 0.30 0.00 - 1.80 %    Immature Granulocytes 1.30 (H) 0.00 - 0.90 %    Nucleated RBC 0.00 /100 WBC    " Neutrophils (Absolute) 5.58 2.00 - 7.15 K/uL    Lymphs (Absolute) 1.87 1.00 - 4.80 K/uL    Monos (Absolute) 0.79 0.00 - 0.85 K/uL    Eos (Absolute) 0.23 0.00 - 0.51 K/uL    Baso (Absolute) 0.03 0.00 - 0.12 K/uL    Immature Granulocytes (abs) 0.11 0.00 - 0.11 K/uL    NRBC (Absolute) 0.00 K/uL   ESTIMATED GFR    Collection Time: 05/10/22  6:20 AM   Result Value Ref Range    GFR (CKD-EPI) 51 (A) >60 mL/min/1.73 m 2       Current Facility-Administered Medications   Medication Frequency   • diclofenac sodium (Voltaren) 1 % gel 2 g BID   • traMADol (ULTRAM) 50 MG tablet 50 mg Q4HRS PRN   • oxyCODONE immediate-release (ROXICODONE) tablet 5 mg Q4HRS PRN   • vitamin D3 (cholecalciferol) tablet 1,000 Units DAILY   • hydrOXYzine HCl (ATARAX) tablet 50 mg Q6HRS PRN   • melatonin tablet 3 mg HS PRN   • Respiratory Therapy Consult Continuous RT   • Pharmacy Consult Request ...Pain Management Review 1 Each PHARMACY TO DOSE   • hydrALAZINE (APRESOLINE) tablet 10 mg Q8HRS PRN   • acetaminophen (Tylenol) tablet 650 mg Q4HRS PRN   • senna-docusate (PERICOLACE or SENOKOT S) 8.6-50 MG per tablet 2 Tablet BID    And   • polyethylene glycol/lytes (MIRALAX) PACKET 1 Packet QDAY PRN    And   • magnesium hydroxide (MILK OF MAGNESIA) suspension 30 mL QDAY PRN    And   • bisacodyl (DULCOLAX) suppository 10 mg QDAY PRN   • lactulose 20 GM/30ML solution 30 mL QDAY PRN   • omeprazole (PRILOSEC) capsule 20 mg QAM AC   • artificial tears ophthalmic solution 1 Drop PRN   • benzocaine-menthol (Cepacol) lozenge 1 Lozenge Q2HRS PRN   • mag hydrox-al hydrox-simeth (MAALOX PLUS ES or MYLANTA DS) suspension 20 mL Q2HRS PRN   • ondansetron (ZOFRAN ODT) dispertab 4 mg 4X/DAY PRN    Or   • ondansetron (ZOFRAN) syringe/vial injection 4 mg 4X/DAY PRN   • traZODone (DESYREL) tablet 50 mg QHS PRN   • sodium chloride (OCEAN) 0.65 % nasal spray 2 Spray PRN   • aspirin EC (ECOTRIN) tablet 81 mg DAILY   • clopidogrel (PLAVIX) tablet 75 mg DAILY   • midazolam  (VERSED) 5 mg/mL (1 mL vial) PRN   • atorvastatin (LIPITOR) tablet 40 mg Q EVENING       Orders Placed This Encounter   Procedures   • Diet Order Diet: Cardiac     Standing Status:   Standing     Number of Occurrences:   1     Order Specific Question:   Diet:     Answer:   Cardiac [6]       Radiology    DX-SACRUM AND COCCYX 2+   Final Result      Decreased osseous mineralization without displaced fracture or dislocation. If there is concern for occult fracture, cross-sectional imaging can be obtained.      DX-HIP-COMPLETE - UNILATERAL 2+ LEFT   Final Result      No radiographic evidence of acute traumatic injury.      DX-CHEST-PORTABLE (1 VIEW)   Final Result         No acute cardiac or pulmonary abnormality is identified.            Assessment:    This patient is a 72 y.o. female admitted for acute inpatient rehabilitation with Ischemic stroke (HCC).    I led and attended the weekly conference, and agree with the IDT conference documentation and plan of care as noted below.    Date of conference: 5/11/2022    Goals and barriers: See IDT note.    CM/social support: family supportive    Anticipated DC date: 5/12    Home health: PT/OT/SLP/RN    Equip: FWW    Follow up: PCP, Dr. Wilfrid sim      Problem List/Medical Decision Making and Plan:    Right frontal parietal ischemic stroke  Left hemiparesis, improved  Left sided incoordination, very mild resolved  Nondominant  Left neglect, improved/  Cognitive impairment, improved  Continue full rehab program  PT/OT/SLP, 1 hr each discipline, 5 days per week  5/5: decrease SLP to 30 min, share other 30 min with PPT/OT    Aspirin  Plavix  Eliquis    Outpatient follow up with Dr. Wilfrid sim, referrals made    Consulted Dr. Ortega for adjustment to disability, appreciate assistance    Left hip pain  Status post fall 5/5, unwitnessed in bathroom  X-ray without any evidence of fracture    Left sacral pain, improved  Checked xray - negative for  fracture  ICE  Diclofenac topical    Hypertension  Home medications included carvedilol 6.25 mg twice daily, lisinopril 20 mg daily, Lasix 20 mg daily, spironolactone 25 mg daily  Currently not requiring any medications for BP  Outpatient follow-up with cardiologist     Chronic systolic CHF  Ejection fraction of 35 to 40%  Elevated BNP, slightly increased since admission  Low-dose lisinopril, discontinued due to low BPs  Previously on Lasix 20 mg daily and spironolactone 25 mg daily - per review of notes appears to have been started on spironolactone by her Keithjc Xie cardiologist for hypokalemia.   Monitor for edema, currently none    Coronary artery disease  History of 3 coronary stents  Aspirin  Plavix  Statin  Eliquis  Outpatient follow up with cardiology at [facility]    History of pacemaker placement  Paced rhythm  Outpatient follow up with cardiology    GI prophylaxis  Omeprazole     Chronic kidney disease, stage III  Avoid nephrotoxins  Renally dose medications  Monitor with intermittent labs     COPD  Respiratory therapist evaluation  Not requiring any breathing treatment    Leukocytosis, resolved  Afebrile  Negative CXR  Positive UA    Positive urinalysis  Polyuria, stable  Culture negative     Peripheral vascular disease  History of stents in the bilateral iliac arteries  Aspirin  Plavix  Statin  Eliquis  Outpatient follow up with vascular surgery as needed     Sleep apnea  Did not tolerate CPAP     Anemia  Monitor with intermittent labs  Slightly decreased 11.6 --> 10.4  No signs of bleeding    GI prophylaxis  Omeprazole    Vitamin D deficiency, 15  Continue supplementation    Bowel program  Constipation, resolved  Continue bowel medications  Last BM 5/10    Bladder program  Check PVRs - 1, 39  Not retaining    DVT prophylaxis  Eliquis      Total time:  40 minutes.  I spent greater than 50% of the time for patient care, counseling, and coordination on this date, including patient face-to face time,  unit/floor time with review of records/pertinent lab data and studies, as well as discussing diagnostic evaluation/work up, planned therapeutic interventions, and future disposition of care, as per the interval events/subjective and the assessment and plan as noted above.      Abida Boyce M.D.   Physical Medicine and Rehabilitation

## 2022-05-11 NOTE — THERAPY
Physical Therapy   Daily Treatment     Patient Name: Ashlee Sepulveda  Age:  72 y.o., Sex:  female  Medical Record #: [id number]  Today's Date: 5/11/2022     Precautions  Precautions: Fall Risk  Comments: L hemiparesis, WBAT LLE, hx of falls    Subjective    Pt seated in w/c upon arrival, agreeable to session.     Objective       05/11/22 1001   Pain   Intervention Declines   Pain 0 - 10 Group   Pain Rating Scale (NPRS) 0   Cognition    Level of Consciousness Alert   Gait Functional Level of Assist    Gait Level Of Assist Standby Assist   Assistive Device Front Wheel Walker   Distance (Feet)   (240x1, 200x1)   # of Times Distance was Traveled 1   Deviation Bradykinetic;Decreased Heel Strike;Decreased Toe Off   Stairs Functional Level of Assist   Level of Assist with Stairs Standby Assist   # of Stairs Climbed 8  (RHR/LHR)   Stairs Description Extra time;Hand rails;Limited by fatigue;Supervision for safety   Transfer Functional Level of Assist   Bed, Chair, Wheelchair Transfer Supervised   Bed Chair Wheelchair Transfer Description Adaptive equipment;Increased time;Supervision for safety   Standing Lower Body Exercises   Hip Extension 1 set of 10;Bilateral    Hip Abduction 1 set of 10;Bilateral   Marching 1 set of 10   Heel Rise 1 set of 10;Bilateral   Toe Rise 1 set of 10;Bilateral   Mini Squat Partial;1 set of 10   Avila Balance Scale   Sitting Unsupported (Score 0-4) 4   Change Of Positon: Sitting To Standing (Score 0-4) 3   Change Of Positon: Standing To Sitting (Score 0-4) 3   Transfers (Score 0-4) 3   Standing Unsupported (Score 0-4) 3   Standing With Eyes Closed (Score 0-4) 3   Standing With Feet Together (Score 0-4) 3   Tandem Standing (Score 0-4) 2   Standing On One Leg (Score 0-4) 0   Turning Trunk (Feet Fixed) (Score 0-4) 3   Retrieving Objects From Floor (Score 0-4) 3   Turning 360 Degrees (Score 0-4) 2   Stool Stepping (Score 0-4) 1   Reaching Forward While Standing (Score 0-4) 1   Avila Balance Total Score  (0-56) 34   PT Total Time Spent   PT Individual Total Time Spent (Mins) 60   PT Charge Group   PT Gait Training 1   PT Therapeutic Exercise 1   PT Neuromuscular Re-Education / Balance 1   PT Therapeutic Activities 1   Supervising Physical Therapist Stephanie Petersen       Assessment    Pt tolerated session well, completed Avila assessment and pt scored 34/56 and that remain pt in risk of falling. Reviewed standing HEP and pt was able to complete w/ handout. Still requiring cues during amb but pt is aware what she needs to correct.      Strengths: Able to follow instructions, Adequate strength, Good insight into deficits/needs, Independent prior level of function, Good carryover of learning, Motivated for self care and independence, Pleasant and cooperative, Supportive family, Willingly participates in therapeutic activities  Barriers: Decreased endurance, Hemiparesis, Impaired balance    Plan    D/c tomorrow 5/12/22    Passport items to be completed:  Completed    Physical Therapy Problems (Active)       Problem: Balance       Dates: Start: 05/03/22         Goal: STG-Within one week, patient will maintain dynamic standing perform Avila       Dates: Start: 05/03/22   Expected End: 05/12/22         Goal Note filed on 05/04/22 0824 by Karol Carlson PTA       PT eval performed yesterday                 Problem: PT-Long Term Goals       Dates: Start: 05/03/22         Goal: LTG-By discharge, patient will ambulate with FWW x100' indoors SPV       Dates: Start: 05/03/22   Expected End: 05/12/22            Goal: LTG-By discharge, patient will transfer one surface to another [place] w/c<>bed       Dates: Start: 05/03/22   Expected End: 05/12/22            Goal: LTG-By discharge, patient will perform home exercise program with set up       Dates: Start: 05/03/22   Expected End: 05/12/22            Goal: LTG-By discharge, patient will ambulate up/down 4-6 stairs SPV with 1 HR       Dates: Start: 05/03/22   Expected End: 05/12/22

## 2022-05-11 NOTE — DISCHARGE INSTRUCTIONS
[facility] NURSING DISCHARGE INSTRUCTIONS    Blood Pressure : 127/83  Weight: 59 kg (130 lb)  Nursing recommendations for Ashlee Sepulveda at time of discharge are as follows:  Patient and family verbalized understanding of all discharge instructions and prescriptions.     Review all your home medications and newly ordered medications with your doctor and/or pharmacist. Follow medication instructions as directed by your doctor and/or pharmacist.    Pain Management:   Discharge Pain Medication Instructions:  Comfort Goal: Comfort with Movement, Perform Activity  Notify your primary care provider if pain is unrelieved with these measures, if the pain is new, or increased in intensity.    Discharge Skin Characteristics:  intact   Discharge Skin Exam:  clean dry and intacyt      Skin / Wound Care Instructions: Please contact your primary care physician for any change in skin integrity.     If You Have Surgical Incisions / Wounds:  Monitor surgical site(s) for signs of increased swelling, redness or symptoms of drainage from the site or fever as this could indicate signs and symptoms of infection. If these symptoms are noted, notifiy your primary care provider.      Discharge Safety Instructions: Should Not Be Left Alone In The House     Discharge Safety Concerns: Balance Problems (Dizziness, Light Headedness)  The interdisciplinary team has made recommendation that you supervision in the house due to above reasons.   Anti-embolic stockings are not required.     Discharge Diet: Cardiac thin     Discharge Liquids: Thin liquids  Discharge Bowel Function: Incontinent  Please contact your primary care physician for any changes in bowel habits.  Discharge Bowel Program:    Discharge Bladder Function: Continent  Discharge Urinary Devices:        Nursing Discharge Plan:        Case Management Discharge Instructions:   Discharge Location:    Agency Name/Address/Phone:    Home Health:    Outpatient  Services:    DME Provider/Phone:    Medical Equipment Ordered:    Prescription Faxed to:        Discharge Medication Instructions:  Below are the medications your physician expects you to take upon discharge:  Stroke Prevention  Some medical conditions and behaviors are associated with a higher chance of having a stroke. You can help prevent a stroke by making nutrition, lifestyle, and other changes, including managing any medical conditions you may have.  What nutrition changes can be made?    Eat healthy foods. You can do this by:  Choosing foods high in fiber, such as fresh fruits and vegetables and whole grains.  Eating at least 5 or more servings of fruits and vegetables a day. Try to fill half of your plate at each meal with fruits and vegetables.  Choosing lean protein foods, such as lean cuts of meat, poultry without skin, fish, tofu, beans, and nuts.  Eating low-fat dairy products.  Avoiding foods that are high in salt (sodium). This can help lower blood pressure.  Avoiding foods that have saturated fat, trans fat, and cholesterol. This can help prevent high cholesterol.  Avoiding processed and premade foods.  Follow your health care provider's specific guidelines for losing weight, controlling high blood pressure (hypertension), lowering high cholesterol, and managing diabetes. These may include:  Reducing your daily calorie intake.  Limiting your daily sodium intake to 1,500 milligrams (mg).  Using only healthy fats for cooking, such as olive oil, canola oil, or sunflower oil.  Counting your daily carbohydrate intake.  What lifestyle changes can be made?  Maintain a healthy weight. Talk to your health care provider about your ideal weight.  Get at least 30 minutes of moderate physical activity at least 5 days a week. Moderate activity includes brisk walking, biking, and swimming.  Do not use any products that contain nicotine or tobacco, such as cigarettes and e-cigarettes. If you need help quitting, ask  your health care provider. It may also be helpful to avoid exposure to secondhand smoke.  Limit alcohol intake to no more than 1 drink a day for nonpregnant women and 2 drinks a day for men. One drink equals 12 oz of beer, 5 oz of wine, or 1½ oz of hard liquor.  Stop any illegal drug use.  Avoid taking birth control pills. Talk to your health care provider about the risks of taking birth control pills if:  You are over 35 years old.  You smoke.  You get migraines.  You have ever had a blood clot.  What other changes can be made?  Manage your cholesterol levels.  Eating a healthy diet is important for preventing high cholesterol. If cholesterol cannot be managed through diet alone, you may also need to take medicines.  Take any prescribed medicines to control your cholesterol as told by your health care provider.  Manage your diabetes.  Eating a healthy diet and exercising regularly are important parts of managing your blood sugar. If your blood sugar cannot be managed through diet and exercise, you may need to take medicines.  Take any prescribed medicines to control your diabetes as told by your health care provider.  Control your hypertension.  To reduce your risk of stroke, try to keep your blood pressure below 130/80.  Eating a healthy diet and exercising regularly are an important part of controlling your blood pressure. If your blood pressure cannot be managed through diet and exercise, you may need to take medicines.  Take any prescribed medicines to control hypertension as told by your health care provider.  Ask your health care provider if you should monitor your blood pressure at home.  Have your blood pressure checked every year, even if your blood pressure is normal. Blood pressure increases with age and some medical conditions.  Get evaluated for sleep disorders (sleep apnea). Talk to your health care provider about getting a sleep evaluation if you snore a lot or have excessive sleepiness.  Take  over-the-counter and prescription medicines only as told by your health care provider. Aspirin or blood thinners (antiplatelets or anticoagulants) may be recommended to reduce your risk of forming blood clots that can lead to stroke.  Make sure that any other medical conditions you have, such as atrial fibrillation or atherosclerosis, are managed.  What are the warning signs of a stroke?  The warning signs of a stroke can be easily remembered as BEFAST.  B is for balance. Signs include:  Dizziness.  Loss of balance or coordination.  Sudden trouble walking.  E is for eyes. Signs include:  A sudden change in vision.  Trouble seeing.  F is for face. Signs include:  Sudden weakness or numbness of the face.  The face or eyelid drooping to one side.  A is for arms. Signs include:  Sudden weakness or numbness of the arm, usually on one side of the body.  S is for speech. Signs include:  Trouble speaking (aphasia).  Trouble understanding.  T is for time.  These symptoms may represent a serious problem that is an emergency. Do not wait to see if the symptoms will go away. Get medical help right away. Call your local emergency services (911 in the [place] Do not drive yourself to the hospital.  Other signs of stroke may include:  A sudden, severe headache with no known cause.  Nausea or vomiting.  Seizure.  Where to find more information  For more information, visit:  American Stroke Association: www.strokeassociation.org  National Stroke Association: www.stroke.org  Summary  You can prevent a stroke by eating healthy, exercising, not smoking, limiting alcohol intake, and managing any medical conditions you may have.  Do not use any products that contain nicotine or tobacco, such as cigarettes and e-cigarettes. If you need help quitting, ask your health care provider. It may also be helpful to avoid exposure to secondhand smoke.  Remember BEFAST for warning signs of stroke. Get help right away if you or a loved one has any of  these signs.  This information is not intended to replace advice given to you by your health care provider. Make sure you discuss any questions you have with your health care provider.  Document Released: 01/25/2006 Document Revised: 11/30/2018 Document Reviewed: 01/23/2018  Elsevier Patient Education © 2020 [employer]  Fall Prevention in the Home, Adult  Falls can cause injuries and can affect people from all age groups. There are many simple things that you can do to make your home safe and to help prevent falls. Ask for help when making these changes, if needed.  What actions can I take to prevent falls?  General instructions  Use good lighting in all rooms. Replace any light bulbs that burn out.  Turn on lights if it is dark. Use night-lights.  Place frequently used items in easy-to-reach places. Lower the shelves around your home if necessary.  Set up furniture so that there are clear paths around it. Avoid moving your furniture around.  Remove throw rugs and other tripping hazards from the floor.  Avoid walking on wet floors.  Fix any uneven floor surfaces.  Add color or contrast paint or tape to grab bars and handrails in your home. Place contrasting color strips on the first and last steps of stairways.  When you use a stepladder, make sure that it is completely opened and that the sides are firmly locked. Have someone hold the ladder while you are using it. Do not climb a closed stepladder.  Be aware of any and all pets.  What can I do in the bathroom?         Keep the floor dry. Immediately clean up any water that spills onto the floor.  Remove soap buildup in the tub or shower on a regular basis.  Use non-skid mats or decals on the floor of the tub or shower.  Attach bath mats securely with double-sided, non-slip rug tape.  If you need to sit down while you are in the shower, use a plastic, non-slip stool.  Install grab bars by the toilet and in the tub and shower. Do not use towel bars as grab  bars.  What can I do in the bedroom?  Make sure that a bedside light is easy to reach.  Do not use oversized bedding that drapes onto the floor.  Have a firm chair that has side arms to use for getting dressed.  What can I do in the kitchen?  Clean up any spills right away.  If you need to reach for something above you, use a sturdy step stool that has a grab bar.  Keep electrical cables out of the way.  Do not use floor polish or wax that makes floors slippery. If you must use wax, make sure that it is non-skid floor wax.  What can I do in the stairways?  Do not leave any items on the stairs.  Make sure that you have a light switch at the top of the stairs and the bottom of the stairs. Have them installed if you do not have them.  Make sure that there are handrails on both sides of the stairs. Fix handrails that are broken or loose. Make sure that handrails are as long as the stairways.  Install non-slip stair treads on all stairs in your home.  Avoid having throw rugs at the top or bottom of stairways, or secure the rugs with carpet tape to prevent them from moving.  Choose a carpet design that does not hide the edge of steps on the stairway.  Check any carpeting to make sure that it is firmly attached to the stairs. Fix any carpet that is loose or worn.  What can I do on the outside of my home?  Use bright outdoor lighting.  Regularly repair the edges of walkways and driveways and fix any cracks.  Remove high doorway thresholds.  Trim any shrubbery on the main path into your home.  Regularly check that handrails are securely fastened and in good repair. Both sides of any steps should have handrails.  Install guardrails along the edges of any raised decks or porches.  Clear walkways of debris and clutter, including tools and rocks.  Have leaves, snow, and ice cleared regularly.  Use sand or salt on walkways during winter months.  In the garage, clean up any spills right away, including grease or oil spills.  What  other actions can I take?  Wear closed-toe shoes that fit well and support your feet. Wear shoes that have rubber soles or low heels.  Use mobility aids as needed, such as canes, walkers, scooters, and crutches.  Review your medicines with your health care provider. Some medicines can cause dizziness or changes in blood pressure, which increase your risk of falling.  Talk with your health care provider about other ways that you can decrease your risk of falls. This may include working with a physical therapist or  to improve your strength, balance, and endurance.  Where to find more information  Centers for Disease Control and Prevention, [place]: https://www.cdc.gov  National [place] on Aging: https://qg4pbig.ramona.nih.gov  Contact a health care provider if:  You are afraid of falling at home.  You feel weak, drowsy, or dizzy at home.  You fall at home.  Summary  There are many simple things that you can do to make your home safe and to help prevent falls.  Ways to make your home safe include removing tripping hazards and installing grab bars in the bathroom.  Ask for help when making these changes in your home.  This information is not intended to replace advice given to you by your health care provider. Make sure you discuss any questions you have with your health care provider.  Document Released: 12/08/2003 Document Revised: 11/30/2018 Document Reviewed: 08/02/2018  Elsevier Patient Education © 2020 [employer]vier Inc.      Physical Therapy Discharge Instructions for Ashlee Sepulveda    5/11/2022    Weight Bearing Status - Patient Should: Bear Weight as Tolerated Left Leg  Level of Assist Required for Ambulation: Supervision on Flat Surfaces, Assist for Balance on Curbs, Assist for Balance on Stairs  Distance Patient May Ambulate: ~ 250 ft  Device Recommended for Ambulation: Front-Wheeled Walker  Level of Assist Required for Transfers: Supervision  Device Recommended for Transfers: Front-Wheeled Walker  Home  Exercise Program: Refer to Home Exercise Program Handout for Details  Prosthesis / Orthosis Recommendation / Location: No Prosthesis  or Orthosis Recommended    It's been great working with you Khushbu! Keep up the great work :) - Karol    Occupational Therapy Discharge Instructions for Ashlee Sepulveda    5/11/2022    Level of Assist Required for Eating: Able to Complete Eating without Assist  Level of Assist Required for Grooming: Able to Complete Grooming without Assist  Level of Assist Required for Dressing: Requires Physical Assist with Dressing  Level of Assist Required for Toileting: Requires Supervision with Toileting  Level of Assist Required for Toilet Transfer: Requires Supervision with Toilet Transfer  Equipment for Toilet Transfer: Grab Bars by Toilet  Level of Assist Required for Bathing: Requires Supervision with Bathing  Equipment for Bathing: Shower Chair, Grab Bars in Tub / Shower, Hand Held Shower Head  Level of Assist Required for Shower Transfer: Requires Supervision with Shower Transfer  Equipment for Shower Transfer: Grab Bars in Tub / Shower, Shower Chair  Level of Assist Required for Home Mgmt: Requires Supervision with Home Management  Level of Assist Required for Meal Prep: Requires Supervision with Meal Preparation  Driving: Please Contact Physician Prior to Driving  Home Exercise Program: Refer to Home Exercise Program Handout for Details    It has been great working with you, Khushbu! We will miss you and wish you all the best!  -SOFÍA Mason  Depression / Suicide Risk    As you are discharged from this [facility] facility, it is important to learn how to keep safe from harming yourself.    Recognize the warning signs:  Abrupt changes in personality, positive or negative- including increase in energy   Giving away possessions  Change in eating patterns- significant weight changes-  positive or negative  Change in sleeping patterns- unable to sleep or sleeping all the time   Unwillingness or  inability to communicate  Depression  Unusual sadness, discouragement and loneliness  Talk of wanting to die  Neglect of personal appearance   Rebelliousness- reckless behavior  Withdrawal from people/activities they love  Confusion- inability to concentrate     If you or a loved one observes any of these behaviors or has concerns about self-harm, here's what you can do:  Talk about it- your feelings and reasons for harming yourself  Remove any means that you might use to hurt yourself (examples: pills, rope, extension cords, firearm)  Get professional help from the community (Mental Health, Substance Abuse, psychological counseling)  Do not be alone:Call your Safe Contact- someone whom you trust who will be there for you.  Call your local CRISIS HOTLINE 829-0476 or 675-888-1258  Call your local Children's Mobile Crisis Response Team [place] (080) 693-3280 or www.Lionical  Call the toll free National Suicide Prevention Hotlines   National Suicide Prevention Lifeline 085-303-VMPU (6388)  National Hope Line Network 800-SUICIDE (862-7046)

## 2022-05-11 NOTE — CARE PLAN
"  Problem: Balance  Goal: STG-Within one week, patient will maintain dynamic standing perform Avila  Outcome: Not Met     Problem: Mobility  Goal: STG-Within one week, patient will ambulate household distance with FWW x150 CGA with improved posture/L knee stability  Outcome: Met  Goal: STG-Within one week, patient will ascend and descend four to six stairs 1 HR, 6\" steps with Lan  Outcome: Met     Problem: Mobility Transfers  Goal: STG-Within one week, patient will transfer bed to chair CGA SPT  Outcome: Met     "

## 2022-05-11 NOTE — THERAPY
"Occupational Therapy  Daily Treatment     Patient Name: Ashlee Sepulveda  Age:  72 y.o., Sex:  female  Medical Record #: [id number]  Today's Date: 5/11/2022     Precautions  Precautions: (P) Fall Risk  Comments: (P) L hemiparesis, WBAT LLE, hx of falls    Safety   ADL Safety : Requires Supervision for Safety  Bathroom Safety: Requires Supervision for Safety    Subjective    \" I am ready!\"  Cheerfully stated  regarding d/c home  5-12      Objective/Assessment       05/11/22 1401   Precautions   Precautions Fall Risk   Comments L hemiparesis, WBAT LLE, hx of falls   Functional Level of Assist   Tub / Shower Transfers Standby Assist  (step in shower transfer  to and from shower seat using grab bars / FWW)   Interdisciplinary Plan of Care Collaboration   IDT Collaboration with  Family / Caregiver   Patient Position at End of Therapy Seated  (hand off to PT for continued training)   Collaboration Comments spouse present for family training. verbal instruct for need for supervision / SBA for ADL  and related mobility at FWW level as well as conitnued physical assist with socks and shoes as she needed previous to hospitalization.       reviewed   use of  shower seat ( if built in seat is too far to safely reach water controls/ shower head) and grab bars.   provided hand out with access to purchase online or instore purchase at [facility].  both report grand anaer  can make any online purchases they need.  both receptive to grab bar installation.   reviewed  [facility] HEP   Khushbu had no questions  as it was reiwed with  OT day prior.   OT Total Time Spent   OT Individual Total Time Spent (Mins) 30   OT Charge Group   OT Therapy Activity 2       Tx completed with no complaints.  Spouse verbalizes ability to provide assist as needed. Khushbu demonstrates ability to direct care as needed .    Ready for transition home.   Strengths: Able to follow instructions, Effective communication skills, Good insight into deficits/needs, Independent prior " level of function, Manages pain appropriately, Motivated for self care and independence, Pleasant and cooperative, Supportive family, Willingly participates in therapeutic activities  Barriers: Decreased endurance, Generalized weakness, Hemiparesis, Home accessibility, Impaired activity tolerance, Impaired balance, Limited mobility (fragile skin)    Plan    D/c home 5-12-22    Passport  complete   Occupational Therapy Goals (Active)       There are no active problems.

## 2022-05-11 NOTE — THERAPY
Physical Therapy   Daily Treatment     Patient Name: Ashlee Sepulveda  Age:  72 y.o., Sex:  female  Medical Record #: [id number]  Today's Date: 5/11/2022     Precautions  Precautions: Fall Risk  Comments: L hemiparesis, WBAT LLE, hx of falls    Subjective    Pt seated in gym w/ SO presented, agreeable to FT.     Objective       05/11/22 1431   Pain   Intervention Declines   Pain 0 - 10 Group   Pain Rating Scale (NPRS) 0   Cognition    Level of Consciousness Alert   Gait Functional Level of Assist    Gait Level Of Assist Standby Assist   Assistive Device Front Wheel Walker   Distance (Feet) 60   # of Times Distance was Traveled 2   Deviation Bradykinetic;Decreased Heel Strike;Decreased Toe Off   Stairs Functional Level of Assist   Level of Assist with Stairs Standby Assist   # of Stairs Climbed 8  (RHR/LHR)   Stairs Description Extra time;Hand rails;Supervision for safety   Transfer Functional Level of Assist   Bed, Chair, Wheelchair Transfer Supervised   Bed Chair Wheelchair Transfer Description Adaptive equipment;Increased time;Supervision for safety  (SPT w/ FWW)   Interdisciplinary Plan of Care Collaboration   IDT Collaboration with  Family / Caregiver   Patient Position at End of Therapy Seated;Call Light within Reach;Tray Table within Reach;Phone within Reach;Family / Friend in Room   Collaboration Comments SO presented for FT   PT Total Time Spent   PT Individual Total Time Spent (Mins) 30   PT Charge Group   PT Gait Training 1   PT Therapeutic Activities 1   Supervising Physical Therapist Stephanie Petersen     Completed car xfer into personal vehicle(SUV) w/ SPV.  SO provided CGA during amb and stairs, education on use of gait belt.    Assessment    SO presented throughout and provided CGA for mobility, daughter presented at the end of session during car xfer and pt completed car xfer w/ SPV and FWW. Therapist discussed d/c plan that HH will come 2/wk and cont to work on mobility and balance. Family and pt stated no  further question for now.    Strengths: Able to follow instructions, Adequate strength, Good insight into deficits/needs, Independent prior level of function, Good carryover of learning, Motivated for self care and independence, Pleasant and cooperative, Supportive family, Willingly participates in therapeutic activities  Barriers: Decreased endurance, Hemiparesis, Impaired balance    Plan    D/c tomorrow 5/12/22.    Passport items to be completed:  Completed    Physical Therapy Problems (Active)       Problem: Balance       Dates: Start: 05/03/22         Goal: STG-Within one week, patient will maintain dynamic standing perform Avila       Dates: Start: 05/03/22   Expected End: 05/12/22         Goal Note filed on 05/04/22 0824 by Karol Carlson, MURALI       PT eval performed yesterday                 Problem: PT-Long Term Goals       Dates: Start: 05/03/22         Goal: LTG-By discharge, patient will ambulate with FWW x100' indoors SPV       Dates: Start: 05/03/22   Expected End: 05/12/22            Goal: LTG-By discharge, patient will transfer one surface to another M[place] w/c<>bed       Dates: Start: 05/03/22   Expected End: 05/12/22            Goal: LTG-By discharge, patient will ambulate up/down 4-6 stairs SPV with 1 HR       Dates: Start: 05/03/22   Expected End: 05/12/22

## 2022-05-12 VITALS
OXYGEN SATURATION: 97 % | DIASTOLIC BLOOD PRESSURE: 83 MMHG | HEART RATE: 70 BPM | TEMPERATURE: 98.7 F | RESPIRATION RATE: 16 BRPM | BODY MASS INDEX: 20.89 KG/M2 | WEIGHT: 130 LBS | HEIGHT: 66 IN | SYSTOLIC BLOOD PRESSURE: 127 MMHG

## 2022-05-12 PROCEDURE — 700102 HCHG RX REV CODE 250 W/ 637 OVERRIDE(OP): Performed by: PHYSICAL MEDICINE & REHABILITATION

## 2022-05-12 PROCEDURE — 700102 HCHG RX REV CODE 250 W/ 637 OVERRIDE(OP): Performed by: HOSPITALIST

## 2022-05-12 PROCEDURE — A9270 NON-COVERED ITEM OR SERVICE: HCPCS | Performed by: HOSPITALIST

## 2022-05-12 PROCEDURE — 99239 HOSP IP/OBS DSCHRG MGMT >30: CPT | Performed by: PHYSICAL MEDICINE & REHABILITATION

## 2022-05-12 PROCEDURE — 99231 SBSQ HOSP IP/OBS SF/LOW 25: CPT | Performed by: HOSPITALIST

## 2022-05-12 PROCEDURE — A9270 NON-COVERED ITEM OR SERVICE: HCPCS | Performed by: PHYSICAL MEDICINE & REHABILITATION

## 2022-05-12 RX ADMIN — ASPIRIN 81 MG: 81 TABLET, COATED ORAL at 09:04

## 2022-05-12 RX ADMIN — Medication 1000 UNITS: at 09:05

## 2022-05-12 RX ADMIN — CLOPIDOGREL BISULFATE 75 MG: 75 TABLET ORAL at 09:05

## 2022-05-12 ASSESSMENT — ENCOUNTER SYMPTOMS
CHILLS: 0
VOMITING: 0
FOCAL WEAKNESS: 1
BRUISES/BLEEDS EASILY: 0
ABDOMINAL PAIN: 0
EYES NEGATIVE: 1
SHORTNESS OF BREATH: 0
FEVER: 0
COUGH: 0
POLYDIPSIA: 0
NAUSEA: 0
PALPITATIONS: 0

## 2022-05-12 NOTE — DISCHARGE SUMMARY
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)    • Impaired mobility and ADLs    • Presence of cardiac pacemaker    • Panlobular emphysema (HCC)    • Obstructive sleep apnea syndrome    • Peripheral arterial disease (HCC)    • S/P coronary artery stent placement    • Essential hypertension    • Arteriosclerotic heart disease (ASHD)    • Dyslipidemia        Discharge Diagnosis:  Active Hospital Problems    Diagnosis    • *Ischemic stroke (HCC)    • Vitamin D deficiency    • CKD (chronic kidney disease)    • 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 female with a past medical history of coronary artery disease status post stent x3 on aspirin, chronic systolic CHF, COPD, hypertension, hyperlipidemia, severe peripheral artery disease on Eliquis, chronic kidney disease stage III, sleep apnea; now admitted for acute inpatient rehabilitation with severe functional debility after an acute stroke.       On admission the patient and medical record report patient was admitted to [facility] on 4/29 from [place] with acute onset of left-sided weakness and slurred speech.  She was on aspirin and Eliquis.  Imaging showed negative head CT, MRI with acute right frontal and parietal watershed strokes.  She had carotid ultrasounds which were negative for any significant stenosis.  She had  an echocardiogram which showed ejection fraction of 35 to 40% as well as mild pericardial effusion.  Patient was also noted to have a left hip hematoma from her fall.  Labs showed anemia with a hemoglobin of 11.5.  She also had acute on chronic kidney injury with elevated BUN, creatinine, and potassium.  Recommendations was to add Plavix to her Eliquis and aspirin regimen.  She was given IV fluids for her acute kidney injury.     Patient current reports improving left hemiparesis.  She is right-hand dominant.  She denies any changes in her vision or paresthesias.  Denies any issues with her bowel or bladder.  Confirm she was previously on aspirin and Eliquis prior to the stroke.     Patient was evaluated by Rehab Medicine physician and Physical Therapy, Occupational Therapy and Speech Therapy and determined to be appropriate for acute inpatient rehab and was transferred to [facility] on 5/2/2022  3:48 PM.    Rehab Hospital Course by Problem List:    Right frontal parietal ischemic stroke  Left hemiparesis, improved  Left sided incoordination, very mild resolved  Nondominant  Left neglect, improved/  Cognitive impairment, improved     Aspirin  Plavix  Eliquis     Outpatient follow up with stroke bridge, Dr. Yuen, referrals made     Consulted Dr. Ortega for adjustment to disability, appreciate assistance     Left hip pain  Status post fall 5/5, unwitnessed in bathroom  X-ray without any evidence of fracture     Left sacral pain, improved/resolved  Checked xray - negative for fracture  ICE  Diclofenac topical     Hypertension  Home medications included carvedilol 6.25 mg twice daily, lisinopril 20 mg daily, Lasix 20 mg daily, spironolactone 25 mg daily  Currently not requiring any medications for BP  Outpatient follow-up with cardiologist/PCP     Chronic systolic CHF  Ejection fraction of 35 to 40%  Elevated BNP, slightly increased since admission  Low-dose lisinopril, discontinued due to low  BPs  Previously on Lasix 20 mg daily and spironolactone 25 mg daily - per review of notes appears to have been [facility]   Monitor for edema, currently none     Coronary artery disease  History of 3 coronary stents  Aspirin  Plavix  Statin  Eliquis  Outpatient follow up with cardiology at [facility]     History of pacemaker placement  Paced rhythm  Outpatient follow up with cardiology     GI prophylaxis  Omeprazole, discontinued at discharge     Chronic kidney disease, stage III  Avoid nephrotoxins  Renally dose medications  Monitor with intermittent labs     COPD  Respiratory therapist evaluation  Not requiring any breathing treatments     Leukocytosis, resolved  Afebrile  Negative CXR  Positive UA     Positive urinalysis  Polyuria, stable  Culture negative     Peripheral vascular disease  History of stents in the bilateral iliac arteries  Aspirin  Plavix  Statin  Eliquis  Outpatient follow up with vascular surgery as needed     Sleep apnea  Did not tolerate CPAP     Anemia  Monitor with intermittent labs  Slightly decreased 11.6 --> 10.4  No signs of bleeding     Vitamin D deficiency, 15  Continue supplementation     Bowel program  Constipation, resolved  Continue bowel medications  Last BM 5/11     Bladder program  Check PVRs - 1, 39  Not retaining     DVT prophylaxis  Eliquis       Labs    Lab Results   Component Value Date/Time    SODIUM 141 05/10/2022 06:20 AM    POTASSIUM 4.5 05/10/2022 06:20 AM    CHLORIDE 109 05/10/2022 06:20 AM    CO2 23 05/10/2022 06:20 AM    GLUCOSE 89 05/10/2022 06:20 AM    BUN 28 (H) 05/10/2022 06:20 AM    CREATININE 1.13 05/10/2022 06:20 AM      Lab Results   Component Value Date/Time    WBC 8.6 05/10/2022 06:20 AM    RBC 3.39 (L) 05/10/2022 06:20 AM    HEMOGLOBIN 10.4 (L) 05/10/2022 06:20 AM    HEMATOCRIT 32.0 (L) 05/10/2022 06:20 AM    MCV 94.4 05/10/2022 06:20 AM    MCH 30.7 05/10/2022 06:20 AM    MCHC 32.5 (L)  05/10/2022 06:20 AM    MPV 9.8 05/10/2022 06:20 AM    NEUTSPOLYS 64.80 05/10/2022 06:20 AM    LYMPHOCYTES 21.70 (L) 05/10/2022 06:20 AM    MONOCYTES 9.20 05/10/2022 06:20 AM    EOSINOPHILS 2.70 05/10/2022 06:20 AM    BASOPHILS 0.30 05/10/2022 06:20 AM        Functional Status at Discharge  Eating:  Modified Independent  Eating Description:  Increased time  Grooming:  Modified Independent, Seated  Grooming Description:  Increased time  Bathing:  Supervision  Bathing Description:  Grab bar, Hand held shower, Tub bench, Increased time, Supervision for safety  Upper Body Dressing:  Modified Independent  Upper Body Dressing Description:  Increased time  Lower Body Dressing:  Minimal Assist  Lower Body Dressing Description:  Increased time, Supervision for safety (assistance to don socks)  Discharge Location : Home  Patient Discharging with Assist of: Spouse / Significant Other  Level of Supervision Required: Intermittent Supervision  Recommended Equipment for Discharge: Front-Wheeled Walker;Shower Chair;Grab Bars by Toilet;Grab Bars in Tub / Shower  Recommended Services Upon Discharge: Home Health Occupational Therapy  Long Term Goals Met: 1  Long Term Goals Not Met: 1  Reason(s) for Goals Not Met: pt con't to require assistance with donning/doffing footwear (reports she required assistance at baseline)  Criteria for Termination of Services: Maximum Function Achieved for Inpatient Rehabilitation  Walk:  Standby Assist  Distance Walked:  60  Number of Times Distance Was Traveled:  2  Assistive Device:  Front Wheel Walker  Gait Deviation:  Bradykinetic, Decreased Heel Strike, Decreased Toe Off  Wheelchair:  Supervised  Distance Propelled:  150   Wheelchair Description:  Extra time, Leg rest management, Limited by fatigue, Supervision for safety  Stairs Standby Assist  Stairs Description Extra time, Hand rails, Supervision for safety  Discharge Location: Home  Patient Discharging with Assist of: Spouse / Significant  Other  Level of Supervision Required Upon Discharge: Intermittent Supervision  Recommended Equipment for Discharge: Front-Wheeled Walker  Recommeded Services Upon Discharge: Home Health Physical Therapy  Long Term Goals Met: 1  Long Term Goals Not Met: 3  Reason(s) for Goals Not Met: pt's at CGA/SBA level for mobility, not yet at SPV due to safety concern  Criteria for Termination of Services: Maximum Function Achieved for Inpatient Rehabilitation  Comprehension:  Independent  Comprehension Description:     Expression:  Independent  Expression Description:     Social Interaction:  Independent  Social Interaction Description:     Problem Solving:  Moderate Assist  Problem Solving Description:  Verbal cueing, Therapy schedule, Increased time  Memory:  Moderate Assist  Memory Description:  Increased time, Therapy schedule, Verbal cueing       Abida MARRERO M.D., personally performed a complete drug regimen review and no potential clinically significant medication issues were identified.     Discharge Medication:     Medication List      START taking these medications      Instructions   acetaminophen 325 MG Tabs  Commonly known as: Tylenol  Notes to patient: Pain    Take 2 Tablets by mouth every four hours as needed for Mild Pain.  Dose: 650 mg     diclofenac sodium 1 % Gel  Commonly known as: Voltaren  Notes to patient: Pain    Apply 2 g topically 2 times a day as needed (sacral pain).  Dose: 2 g     vitamin D 1000 UNIT Tabs  Commonly known as: VITAMIND D3  Notes to patient: Supplement    Take 1 Tablet by mouth every day.  Dose: 1,000 Units        CHANGE how you take these medications      Instructions   atorvastatin 40 MG Tabs  What changed:   · medication strength  · how much to take  · when to take this  · Another medication with the same name was removed. Continue taking this medication, and follow the directions you see here.  Commonly known as: LIPITOR  Notes to patient: Cholesterol    Take 1 Tablet by  mouth every evening.  Dose: 40 mg        CONTINUE taking these medications      Instructions   aspirin EC 81 MG Tbec  Commonly known as: ECOTRIN  Notes to patient: Heart health, plaque buildup    Take 1 Tablet by mouth every day.  Dose: 81 mg     clopidogrel 75 MG Tabs  Commonly known as: PLAVIX  Notes to patient: Blood thinner    Take 1 Tablet by mouth every day.  Dose: 75 mg     nitroglycerin 0.4 MG Subl  Commonly known as: NITROSTAT  Notes to patient: Chest pain    Place 1 Tab under tongue as needed for Chest Pain.  Dose: 0.4 mg        STOP taking these medications    amiodarone 200 MG Tabs  Commonly known as: Cordarone     amLODIPine 5 MG Tabs  Commonly known as: NORVASC     apixaban 5mg Tabs  Commonly known as: ELIQUIS     carvedilol 6.25 MG Tabs  Commonly known as: COREG     enalapril 20 MG tablet  Commonly known as: VASOTEC     ferrous sulfate 324 MG Tbec     furosemide 40 MG Tabs  Commonly known as: LASIX     hydrochlorothiazide 12.5 MG capsule  Commonly known as: MICROZIDE     hydroCHLOROthiazide 12.5 MG tablet  Commonly known as: HYDRODIURIL     lisinopril 20 MG Tabs  Commonly known as: PRINIVIL     lisinopril 40 MG tablet  Commonly known as: PRINIVIL     rosuvastatin 20 MG Tabs  Commonly known as: CRESTOR     spironolactone 25 MG Tabs  Commonly known as: ALDACTONE            Discharge Diet:  Heart healthy    Discharge Activity:  Do not return to driving until cleared by a physician.       Disposition:  Patient to discharge home with family support and community resources.     Equipment:  Follow-up & Discharge Instructions:  Home health: PT/OT/SLP/RN     Equip: FWW     Follow up: PCP, stroke bridge, Dr. Yuen    Condition on Discharge:  Good    More than 35 minutes was spent on discharging this patient, including face-to-face time, prescription management, and the dictation of this note.    Abida Boyce M.D.    Date of Service: 5/12/2022

## 2022-05-12 NOTE — CARE PLAN
"For d/c in am , d/c instructions  initiated .  Problem: Fall Risk - Rehab  Goal: Patient will remain free from falls  Outcome: Progressing  Note: Ella Sandoval Fall risk Assessment Score: 11    Moderate fall risk Interventions  - Bed and strip alarm   - Yellow sign by the door   - Yellow wrist band \"Fall risk\"  - Room near to the nurse station  - Do not leave patient unattended in the bathroom  - Fall risk education provided       Problem: Pain - Standard  Goal: Alleviation of pain or a reduction in pain to the patient’s comfort goal  Outcome: Progressing  Note: Assessed for pain and discomfort , medicated with voltaren cream applied to coccyx  / sacral area .   The patient is Stable - Low risk of patient condition declining or worsening    Shift Goals  Clinical Goals: safety, pain control  Patient Goals: pain control, rest    Progress made toward(s) clinical / shift goals:  Pt free from fall and injury.    "

## 2022-05-12 NOTE — PROGRESS NOTES
Hospital Medicine Daily Progress Note      Chief Complaint:  Hypertension  Leukocytosis  Chronic Kidney Disease    Interval History:  No 24 hour clinical changes.    Review of Systems  Review of Systems   Constitutional: Negative for chills and fever.   HENT: Negative.    Eyes: Negative.    Respiratory: Negative for cough and shortness of breath.    Cardiovascular: Negative for chest pain and palpitations.   Gastrointestinal: Negative for abdominal pain, nausea and vomiting.   Skin: Negative for itching and rash.   Neurological: Positive for focal weakness.   Endo/Heme/Allergies: Negative for polydipsia. Does not bruise/bleed easily.        Physical Exam  Temp:  [36.6 °C (97.8 °F)-37.1 °C (98.7 °F)] 37.1 °C (98.7 °F)  Pulse:  [68-71] 70  Resp:  [16-20] 16  BP: (104-140)/(66-83) 127/83  SpO2:  [97 %] 97 %    Physical Exam  Vitals reviewed.   Constitutional:       General: She is not in acute distress.     Appearance: Normal appearance. She is not ill-appearing.   HENT:      Head: Normocephalic and atraumatic.      Right Ear: External ear normal.      Left Ear: External ear normal.      Nose: Nose normal.      Mouth/Throat:      Pharynx: Oropharynx is clear.   Eyes:      General:         Right eye: No discharge.         Left eye: No discharge.      Extraocular Movements: Extraocular movements intact.      Conjunctiva/sclera: Conjunctivae normal.   Cardiovascular:      Rate and Rhythm: Normal rate and regular rhythm.   Pulmonary:      Effort: Pulmonary effort is normal. No respiratory distress.      Breath sounds: Normal breath sounds. No wheezing.   Abdominal:      General: Bowel sounds are normal. There is no distension.      Palpations: Abdomen is soft.      Tenderness: There is no abdominal tenderness.   Musculoskeletal:      Cervical back: Normal range of motion and neck supple.      Right lower leg: No edema.      Left lower leg: No edema.   Skin:     General: Skin is warm and dry.   Neurological:      Mental  Status: She is alert and oriented to person, place, and time.         Fluids    Intake/Output Summary (Last 24 hours) at 5/12/2022 0932  Last data filed at 5/12/2022 0554  Gross per 24 hour   Intake 820 ml   Output --   Net 820 ml       Laboratory  Recent Labs     05/10/22  0620   WBC 8.6   RBC 3.39*   HEMOGLOBIN 10.4*   HEMATOCRIT 32.0*   MCV 94.4   MCH 30.7   MCHC 32.5*   RDW 51.6*   PLATELETCT 283   MPV 9.8     Recent Labs     05/10/22  0620   SODIUM 141   POTASSIUM 4.5   CHLORIDE 109   CO2 23   GLUCOSE 89   BUN 28*   CREATININE 1.13   CALCIUM 8.8               Assessment/Plan  * Ischemic stroke (HCC)- (present on admission)  Assessment & Plan  Had acute onset of dysarthria and L HP  On ASA, Plavix, and Lipitor  Ongoing management per Physiatry    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

## 2022-05-12 NOTE — DOCUMENTATION QUERY
DOCUMENTATION QUERY    PROVIDERS: Please select “Cosign w/ note”to reply to query.    To better represent the severity of illness of your patient, please review the following information and exercise your independent professional judgment in responding to this query.     The documentation states that the patient has combined systolic and diastolic heart failure. Can the acuity of the CHF be further specified?    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    Risk Factors  hypertension, pericardial effusion, COPD, cardiomyopathy   Location within medical record  H&P, Consult, progress note 5/05- 05/09     Thank You,   Farnaz Bates, [email or web address]

## 2022-05-12 NOTE — DISCHARGE PLANNING
Case management  Spoke to patient / caregiver concerning discharge plan and dc date.  Reviewed signed copy of IM and answered all questions.

## 2022-05-12 NOTE — PROGRESS NOTES
Patient discharged to home per order.  Discharge instructions reviewed with patient and family they verbalize understanding and signed copies placed in chart.  Patient has all belongings; signed copy of form in chart.  Patient left facility at 1105 via w/c accompanied by rehab staff and family.  Have enjoyed working with this pleasant patient.

## 2022-05-12 NOTE — DISCHARGE PLANNING
Case Management Discharge Instructions              Follow-up Information:     David Albert M.D.  [address]  [place], [place] [id number] [phone]  Please call your family doctor to schedule a hospital follow up appointment.     Shazia Yuen D.O.-Physical Medicine and Rehab  [address]  [place] [address] [phone]  Dr. Yuen  will call you to set up this appointment.     LESLI Norman-[facility]rology[facility]  [address]  [place] [facility] [place] [address] [phone]  Please call to set up appointment.       [address] [address] [phone]  Out-patient PT/OT/ST  They will call you to set up first appointment.

## 2022-06-14 DIAGNOSIS — Z95.5 S/P CORONARY ARTERY STENT PLACEMENT: ICD-10-CM

## 2022-06-14 DIAGNOSIS — E78.49 OTHER HYPERLIPIDEMIA: ICD-10-CM

## 2022-06-14 RX ORDER — CLOPIDOGREL BISULFATE 75 MG/1
TABLET ORAL
Qty: 30 TABLET | Refills: 0 | OUTPATIENT
Start: 2022-06-14

## 2022-06-14 RX ORDER — ATORVASTATIN CALCIUM 40 MG/1
TABLET, FILM COATED ORAL
Qty: 30 TABLET | Refills: 0 | OUTPATIENT
Start: 2022-06-14

## 2022-09-09 NOTE — THERAPY
Completed forms faxed to [facility]   Physical Therapy   Initial Evaluation     Patient Name: Ashlee Sepulveda  Age:  72 y.o., Sex:  female  Medical Record #: [id number]  Today's Date: 5/3/2022     Subjective    Pt found in bed, agreeable to PT.     Objective       05/03/22 1501   Prior Living Situation   Prior Services Home-Independent   Housing / Facility 1 Story House   Steps Into Home 4   Steps In Home 0   Rail Left Rail  (Steps into Home);Right Rail (Steps into Home)  (R rail into home front, L rail into home back entrance)   Equipment Owned None   Lives with - Patient's Self Care Capacity Spouse   Prior Level of Functional Mobility   Bed Mobility Independent   Transfer Status Independent   Ambulation Independent   Distance Ambulation (Feet)   (community)   Assistive Devices Used None   Stairs Independent   Comments Pt reports being indep no AD, enjoys gardening, able to do cooking, cleaning, shopping w/o difficulty.  Pt was tearful upone initiation of discussion   Prior Functioning: Everyday Activities   Self Care Independent   Indoor Mobility (Ambulation) Independent   Stairs Independent   Functional Cognition Independent   Prior Device Use None of the given options   Pain 0 - 10 Group   Therapist Pain Assessment Prior to Activity;During Activity   Cognition    Level of Consciousness Alert   Active ROM Lower Body    Active ROM Lower Body  X   Gross Active ROM Gross Active Range of Motion Impaired,  but Appears Adequate for Functional Mobility.   Strength Lower Body   Lower Body Strength  X   Lt Hip Flexion Strength 2 (P)   Lt Knee Extension Strength 4 (G)   Lt Ankle Dorsiflexion Strength 1 (T)   Comments MMT assesed in supine   Sensation Lower Body   Lower Extremity Sensation   WDL   Comments intact to light touch and proprioception   Lower Body Muscle Tone   Lower Body Muscle Tone  WDL   Balance Assessment   Sitting Balance (Static) Good   Sitting Balance (Dynamic) Fair +   Standing Balance (Static) Fair -   Standing Balance (Dynamic) Poor +    Weight Shift Sitting Fair   Weight Shift Standing Poor   Bed Mobility    Supine to Sit Minimal Assist   Sit to Supine Contact Guard Assist   Sit to Stand Minimal Assist   Scooting Contact Guard Assist   Rolling Standby Assist   Neurological Concerns   Neurological Concerns Yes   Sitting Posture During ADL's Forward Head;Kyphotic;Posterior Pelvic Tilt;Lateral Lean Left   Standing Posture During ADL's Lateral Lean Left;Forward Head;Kyphotic;Posterior Pelvic Tilt   Babinski Sign Present  (L LE)   Coordination Lower Body    Coordination Lower Body  X   Heel To Shin Left Impaired   Comments Difficult to discern motor control vs strength, assessed in supine   Roll Left and Right   Assistance Needed Adaptive equipment   Physical Assistance Level No physical assistance   CARE Score - Roll Left and Right 6   Roll Left and Right Discharge Goal   Discharge Goal 6   Sit to Lying   Assistance Needed Physical assistance   Physical Assistance Level 25% or less   CARE Score - Sit to Lying 3   Sit to Lying Discharge Goal   Discharge Goal 6   Lying to Sitting on Side of Bed   Assistance Needed Physical assistance   Physical Assistance Level 25% or less   CARE Score - Lying to Sitting on Side of Bed 3   Lying to Sitting on Side of Bed Discharge Goal   Discharge Goal 6   Sit to Stand   Assistance Needed Physical assistance   Physical Assistance Level 25% or less   CARE Score - Sit to Stand 3   Sit to Stand Discharge Goal   Discharge Goal 6   Chair/Bed-to-Chair Transfer   Assistance Needed Physical assistance   Physical Assistance Level 25% or less   CARE Score - Chair/Bed-to-Chair Transfer 3   Chair/Bed-to-Chair Transfer Discharge Goal   Discharge Goal 6   Car Transfer   Reason if not Attempted Environmental limitations   CARE Score - Car Transfer 10   Car Transfer Discharge Goal   Discharge Goal 4   Walk 10 Feet   Assistance Needed Physical assistance   Physical Assistance Level 25% or less   CARE Score - Walk 10 Feet 3   Walk 10  Feet Discharge Goal   Discharge Goal 6   Walk 50 Feet with Two Turns   Assistance Needed Physical assistance   Physical Assistance Level 25% or less   CARE Score - Walk 50 Feet with Two Turns 3   Walk 50 Feet with Two Turns Discharge Goal   Discharge Goal 6   Walk 150 Feet   Assistance Needed Physical assistance   Physical Assistance Level 25% or less   CARE Score - Walk 150 Feet 3   Walk 150 Feet Discharge Goal   Discharge Goal 6   Walking 10 Feet on Uneven Surfaces   Reason if not Attempted Environmental limitations   CARE Score - Walking 10 Feet on Uneven Surfaces 10   Walking 10 Feet on Uneven Surfaces Discharge Goal   Discharge Goal 5   1 Step (Curb)   Assistance Needed Physical assistance   Physical Assistance Level 25% or less   CARE Score - 1 Step (Curb) 3   1 Step (Curb) Discharge Goal   Discharge Goal 6   4 Steps   Assistance Needed Physical assistance   Physical Assistance Level 25% or less   CARE Score - 4 Steps 3   4 Steps Discharge Goal   Discharge Goal 6   12 Steps   Assistance Needed Physical assistance   Physical Assistance Level 25% or less   CARE Score - 12 Steps 3   12 Steps Discharge Goal   Discharge Goal 6   Picking Up Object   Reason if not Attempted Safety concerns   CARE Score - Picking Up Object 88   Picking Up Object Discharge Goal   Discharge Goal 5   Wheel 50 Feet with Two Turns   Reason if not Attempted Activity not applicable   CARE Score - Wheel 50 Feet with Two Turns 9   Wheel 50 Feet with Two Turns Discharge Goal   Discharge Goal 9   Wheel 150 Feet   Reason if not Attempted Activity not applicable   CARE Score - Wheel 150 Feet 9   Wheel 150 Feet Discharge Goal   Discharge Goal 9   Gait Functional Level of Assist    Gait Level Of Assist Minimal Assist   Assistive Device Front Wheel Walker   Distance (Feet) 50   # of Times Distance was Traveled 3   Deviation Ataxic;Decreased Base Of Support;Bradykinetic;Decreased Heel Strike;Other (Comment)  (L LE adducts, hyperextends with stance  phase, decr clearance with swing phase)   Wheelchair Functional Level of Assist   Wheelchair Assist Minimal Assist   Distance Wheelchair (Feet or Distance) 25'x1   Wheelchair Description Extra time;Assistance with steering;Verbal cueing   Stairs Functional Level of Assist   Level of Assist with Stairs Minimal Assist   # of Stairs Climbed 12   Stairs Description Assist device/equipment;Extra time;Safety concerns;Walker;Verbal cueing   Transfer Functional Level of Assist   Bed, Chair, Wheelchair Transfer Minimal Assist  (w/c<>bed SPT Lan for balance)   Bed Chair Wheelchair Transfer Description Increased time;Initial preparation for task;Set-up of equipment;Verbal cueing  (sup<>sit Lan for upper trunk flex, Lan to steady to midline in w/c and EOB)   Problem List    Problems Impaired Transfers;Impaired Bed Mobility;Impaired Ambulation;Functional Strength Deficit;Impaired Balance;Impaired Coordination;Decreased Activity Tolerance;Motor Planning / Sequencing   Precautions   Precautions Fall Risk   Current Discharge Plan   Current Discharge Plan Return to Prior Living Situation   Interdisciplinary Plan of Care Collaboration   Patient Position at End of Therapy In Bed;Call Light within Reach;Tray Table within Reach;Phone within Reach;Family / Friend in Room   Benefit   Therapy Benefit Patient Would Benefit from Inpatient Rehabilitation Physical Therapy to Maximize Functional [place] with ADLs, IADLs and Mobility.   Strengths & Barriers   Strengths Able to follow instructions;Adequate strength;Good insight into deficits/needs;Independent prior level of function;Good carryover of learning;Motivated for self care and independence;Pleasant and cooperative;Supportive family;Willingly participates in therapeutic activities   Barriers Decreased endurance;Hemiparesis;Impaired balance   PT Total Time Spent   PT Individual Total Time Spent (Mins) 60   PT Charge Group   PT Therapeutic Activities 1   PT Evaluation PT Evaluation  "Mod       Assessment  Patient is 72 y.o. 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  CAD, stent placement x 3, chronic CHF, COPD, HTN, CAD, hyperlipidemia, severe PAD, CKD stage 3, sleep apnea.  Additional factors influencing patient status / progress (ie: cognitive factors, co-morbidities, social support, etc): supportive spouse and grand daughter, indep PLOF      Plan  IRF MARGA's: uneven terrain, object, Avila Recommend Physical Therapy 30-60 minutes per day 5-6 days per week for 2-3 weeks for the following treatments:  PT E Stim Attended, PT Gait Training, PT Self Care/Home Eval, PT Therapeutic Exercises, PT TENS Application, PT Neuro Re-Ed/Balance, PT Aquatic Therapy, PT Therapeutic Activity, and PT Manual Therapy.      Goals:  Long term and short term goals have been discussed with patient and they are in agreement.    Physical Therapy Problems (Active)       Problem: Balance       Dates: Start: 05/03/22         Goal: STG-Within one week, patient will maintain dynamic standing perform Avila       Dates: Start: 05/03/22               Problem: Mobility       Dates: Start: 05/03/22         Goal: STG-Within one week, patient will ambulate household distance with FWW x150 CGA with improved posture/L knee stability       Dates: Start: 05/03/22            Goal: STG-Within one week, patient will ascend and descend four to six stairs 1 HR, 6\" steps with Lan       Dates: Start: 05/03/22               Problem: Mobility Transfers       Dates: Start: 05/03/22         Goal: STG-Within one week, patient will transfer bed to chair CGA SPT       Dates: Start: 05/03/22               Problem: PT-Long Term Goals       Dates: Start: 05/03/22         Goal: LTG-By discharge, patient will ambulate with FWW x100' indoors SPV       Dates: Start: 05/03/22            Goal: LTG-By discharge, patient will transfer one surface to another Magen w/c<>bed       Dates: Start: 05/03/22            Goal: " LTG-By discharge, patient will perform home exercise program with set up       Dates: Start: 05/03/22            Goal: LTG-By discharge, patient will ambulate up/down 4-6 stairs SPV with 1 HR       Dates: Start: 05/03/22

## 2023-04-03 NOTE — THERAPY
Physical Therapy   Daily Treatment     Patient Name: Ashlee Sepulveda  Age:  72 y.o., Sex:  female  Medical Record #: [id number]  Today's Date: 5/5/2022     Precautions  Precautions: Fall Risk  Comments: L carolyne, MCI    Subjective    Pt was lying in bed finishing breakfast upon arrival, agreeable to session.     Objective       05/05/22 0831   Pain   Intervention Declines   Pain 0 - 10 Group   Pain Rating Scale (NPRS) 0   Cognition    Level of Consciousness Alert   Gait Functional Level of Assist    Gait Level Of Assist Contact Guard Assist   Assistive Device Front Wheel Walker   Distance (Feet) 125   # of Times Distance was Traveled 2   Deviation Decreased Base Of Support;Decreased Heel Strike;Decreased Toe Off  (decr clearance with L swing phase, forward lean on FWW)   Stairs Functional Level of Assist   Level of Assist with Stairs Minimal Assist  (CGA ascending, Lan descending)   # of Stairs Climbed 6   Stairs Description Extra time;Hand rails;Supervision for safety;Verbal cueing   Transfer Functional Level of Assist   Bed, Chair, Wheelchair Transfer Contact Guard Assist   Bed Chair Wheelchair Transfer Description Adaptive equipment;Increased time;Supervision for safety;Verbal cueing  (SPT w/ FWW)   Toilet Transfers Contact Guard Assist   Toilet Transfer Description Grab bar;Increased time;Set-up of equipment;Supervision for safety;Verbal cueing   Standing Lower Body Exercises   Other Exercises tap up w/ cues to increase L hip flex and foot clearance at LLE, preventing LLE into hyperextend during stance when tapping up w/ RLE 3x10 each LE   Bed Mobility    Supine to Sit Standby Assist   Sit to Stand Standby Assist   Scooting Standby Assist   Neuro-Muscular Treatments   Neuro-Muscular Treatments Anterior weight shift;Postural Facilitation;Sequencing;Tactile Cuing;Tapping;Verbal Cuing;Weight Shift Right;Weight Shift Left   Comments static standing balance w/ FA, FT in // bars 1 min each, reuqired CGA throughout and not  able to keep UEs clear from // bars w/ FT   Interdisciplinary Plan of Care Collaboration   IDT Collaboration with  Physician   Patient Position at End of Therapy Seated;Call Light within Reach;Tray Table within Reach;Phone within Reach   Collaboration Comments MD rounds   PT Total Time Spent   PT Individual Total Time Spent (Mins) 60   PT Charge Group   PT Gait Training 2   PT Neuromuscular Re-Education / Balance 1   PT Therapeutic Activities 1   Supervising Physical Therapist Sigrid Sanders       Assessment    Pt tolerated session well, was encouraged by her ambulation distance improving every day and pt is requiring less assistance for balance during ambulation. Pt teared up during MD rounds and spoke about stroke recovery. Pt is very motivated.    Strengths: Able to follow instructions,Adequate strength,Good insight into deficits/needs,Independent prior level of function,Good carryover of learning,Motivated for self care and independence,Pleasant and cooperative,Supportive family,Willingly participates in therapeutic activities  Barriers: Decreased endurance,Hemiparesis,Impaired balance    Plan    Ambulation w/ FWW, STS from varying surface and work on hand placement, standing balance, general strengthening and endurance, bed mobility.     Passport items to be completed:  Get in/out of bed safely, in/out of a vehicle, safely use mobility device, walk or wheel around home/community, navigate up and down stairs, show how to get up/down from the ground, ensure home is accessible, demonstrate HEP, complete caregiver training     Physical Therapy Problems (Active)       Problem: Balance       Dates: Start: 05/03/22         Goal: STG-Within one week, patient will maintain dynamic standing perform Avila       Dates: Start: 05/03/22         Goal Note filed on 05/04/22 0824 by Karol Carlson PTA       PT kristine performed yesterday                 Problem: Mobility       Dates: Start: 05/03/22         Goal: STG-Within one week,  "patient will ambulate household distance with FWW x150 CGA with improved posture/L knee stability       Dates: Start: 05/03/22         Goal Note filed on 05/04/22 0824 by Karol Carlson PTA       PT eval performed yesterday              Goal: STG-Within one week, patient will ascend and descend four to six stairs 1 HR, 6\" steps with Lan       Dates: Start: 05/03/22         Goal Note filed on 05/04/22 0824 by Karol Carlson PTA       PT eval performed yesterday                 Problem: Mobility Transfers       Dates: Start: 05/03/22         Goal: STG-Within one week, patient will transfer bed to chair CGA SPT       Dates: Start: 05/03/22         Goal Note filed on 05/04/22 0824 by Karol Carlson PTA       PT eval performed yesterday                 Problem: PT-Long Term Goals       Dates: Start: 05/03/22         Goal: LTG-By discharge, patient will ambulate with FWW x100' indoors SPV       Dates: Start: 05/03/22            Goal: LTG-By discharge, patient will transfer one surface to another Magen w/c<>bed       Dates: Start: 05/03/22            Goal: LTG-By discharge, patient will perform home exercise program with set up       Dates: Start: 05/03/22            Goal: LTG-By discharge, patient will ambulate up/down 4-6 stairs SPV with 1 HR       Dates: Start: 05/03/22              "

## 2023-08-12 ENCOUNTER — HOSPITAL ENCOUNTER (INPATIENT)
Facility: MEDICAL CENTER | Age: 73
LOS: 3 days | DRG: 251 | End: 2023-08-15
Attending: HOSPITALIST | Admitting: HOSPITALIST
Payer: MEDICARE

## 2023-08-12 DIAGNOSIS — I21.4 NSTEMI (NON-ST ELEVATED MYOCARDIAL INFARCTION) (HCC): ICD-10-CM

## 2023-08-12 DIAGNOSIS — I24.9 ACS (ACUTE CORONARY SYNDROME) (HCC): ICD-10-CM

## 2023-08-12 DIAGNOSIS — I50.20 HFREF (HEART FAILURE WITH REDUCED EJECTION FRACTION) (HCC): ICD-10-CM

## 2023-08-12 LAB
ALBUMIN SERPL BCP-MCNC: 4 G/DL (ref 3.2–4.9)
ALBUMIN/GLOB SERPL: 1.5 G/DL
ALP SERPL-CCNC: 62 U/L (ref 30–99)
ALT SERPL-CCNC: 14 U/L (ref 2–50)
ANION GAP SERPL CALC-SCNC: 15 MMOL/L (ref 7–16)
APTT PPP: 74.2 SEC (ref 24.7–36)
AST SERPL-CCNC: 25 U/L (ref 12–45)
BASOPHILS # BLD AUTO: 0.4 % (ref 0–1.8)
BASOPHILS # BLD: 0.06 K/UL (ref 0–0.12)
BILIRUB SERPL-MCNC: 0.4 MG/DL (ref 0.1–1.5)
BUN SERPL-MCNC: 26 MG/DL (ref 8–22)
CALCIUM ALBUM COR SERPL-MCNC: 9.4 MG/DL (ref 8.5–10.5)
CALCIUM SERPL-MCNC: 9.4 MG/DL (ref 8.5–10.5)
CHLORIDE SERPL-SCNC: 106 MMOL/L (ref 96–112)
CO2 SERPL-SCNC: 19 MMOL/L (ref 20–33)
CREAT SERPL-MCNC: 1.02 MG/DL (ref 0.5–1.4)
EKG IMPRESSION: NORMAL
EOSINOPHIL # BLD AUTO: 0.2 K/UL (ref 0–0.51)
EOSINOPHIL NFR BLD: 1.3 % (ref 0–6.9)
ERYTHROCYTE [DISTWIDTH] IN BLOOD BY AUTOMATED COUNT: 45.5 FL (ref 35.9–50)
GFR SERPLBLD CREATININE-BSD FMLA CKD-EPI: 57 ML/MIN/1.73 M 2
GLOBULIN SER CALC-MCNC: 2.6 G/DL (ref 1.9–3.5)
GLUCOSE SERPL-MCNC: 105 MG/DL (ref 65–99)
HCT VFR BLD AUTO: 41.6 % (ref 37–47)
HGB BLD-MCNC: 13.6 G/DL (ref 12–16)
IMM GRANULOCYTES # BLD AUTO: 0.12 K/UL (ref 0–0.11)
IMM GRANULOCYTES NFR BLD AUTO: 0.8 % (ref 0–0.9)
INR PPP: 1.07 (ref 0.87–1.13)
LYMPHOCYTES # BLD AUTO: 2.67 K/UL (ref 1–4.8)
LYMPHOCYTES NFR BLD: 17.2 % (ref 22–41)
MAGNESIUM SERPL-MCNC: 2 MG/DL (ref 1.5–2.5)
MCH RBC QN AUTO: 30.2 PG (ref 27–33)
MCHC RBC AUTO-ENTMCNC: 32.7 G/DL (ref 32.2–35.5)
MCV RBC AUTO: 92.4 FL (ref 81.4–97.8)
MONOCYTES # BLD AUTO: 1.51 K/UL (ref 0–0.85)
MONOCYTES NFR BLD AUTO: 9.7 % (ref 0–13.4)
NEUTROPHILS # BLD AUTO: 10.93 K/UL (ref 1.82–7.42)
NEUTROPHILS NFR BLD: 70.6 % (ref 44–72)
NRBC # BLD AUTO: 0 K/UL
NRBC BLD-RTO: 0 /100 WBC (ref 0–0.2)
PHOSPHATE SERPL-MCNC: 2.5 MG/DL (ref 2.5–4.5)
PLATELET # BLD AUTO: 330 K/UL (ref 164–446)
PMV BLD AUTO: 9.9 FL (ref 9–12.9)
POTASSIUM SERPL-SCNC: 4.3 MMOL/L (ref 3.6–5.5)
PROT SERPL-MCNC: 6.6 G/DL (ref 6–8.2)
PROTHROMBIN TIME: 13.8 SEC (ref 12–14.6)
RBC # BLD AUTO: 4.5 M/UL (ref 4.2–5.4)
SODIUM SERPL-SCNC: 140 MMOL/L (ref 135–145)
TROPONIN T SERPL-MCNC: 160 NG/L (ref 6–19)
TROPONIN T SERPL-MCNC: 277 NG/L (ref 6–19)
TROPONIN T SERPL-MCNC: 479 NG/L (ref 6–19)
UFH PPP CHRO-ACNC: >1.1 IU/ML
WBC # BLD AUTO: 15.5 K/UL (ref 4.8–10.8)

## 2023-08-12 PROCEDURE — 99223 1ST HOSP IP/OBS HIGH 75: CPT | Mod: AI | Performed by: STUDENT IN AN ORGANIZED HEALTH CARE EDUCATION/TRAINING PROGRAM

## 2023-08-12 PROCEDURE — 700111 HCHG RX REV CODE 636 W/ 250 OVERRIDE (IP): Performed by: STUDENT IN AN ORGANIZED HEALTH CARE EDUCATION/TRAINING PROGRAM

## 2023-08-12 PROCEDURE — 36415 COLL VENOUS BLD VENIPUNCTURE: CPT

## 2023-08-12 PROCEDURE — 85520 HEPARIN ASSAY: CPT

## 2023-08-12 PROCEDURE — 93010 ELECTROCARDIOGRAM REPORT: CPT | Performed by: INTERNAL MEDICINE

## 2023-08-12 PROCEDURE — 85730 THROMBOPLASTIN TIME PARTIAL: CPT

## 2023-08-12 PROCEDURE — 93005 ELECTROCARDIOGRAM TRACING: CPT | Performed by: STUDENT IN AN ORGANIZED HEALTH CARE EDUCATION/TRAINING PROGRAM

## 2023-08-12 PROCEDURE — 84100 ASSAY OF PHOSPHORUS: CPT

## 2023-08-12 PROCEDURE — 770020 HCHG ROOM/CARE - TELE (206)

## 2023-08-12 PROCEDURE — 80053 COMPREHEN METABOLIC PANEL: CPT

## 2023-08-12 PROCEDURE — 85025 COMPLETE CBC W/AUTO DIFF WBC: CPT

## 2023-08-12 PROCEDURE — 700111 HCHG RX REV CODE 636 W/ 250 OVERRIDE (IP)

## 2023-08-12 PROCEDURE — 700102 HCHG RX REV CODE 250 W/ 637 OVERRIDE(OP): Performed by: STUDENT IN AN ORGANIZED HEALTH CARE EDUCATION/TRAINING PROGRAM

## 2023-08-12 PROCEDURE — 84484 ASSAY OF TROPONIN QUANT: CPT | Mod: 91

## 2023-08-12 PROCEDURE — 85610 PROTHROMBIN TIME: CPT

## 2023-08-12 PROCEDURE — A9270 NON-COVERED ITEM OR SERVICE: HCPCS | Performed by: STUDENT IN AN ORGANIZED HEALTH CARE EDUCATION/TRAINING PROGRAM

## 2023-08-12 PROCEDURE — 99223 1ST HOSP IP/OBS HIGH 75: CPT | Performed by: INTERNAL MEDICINE

## 2023-08-12 PROCEDURE — 83735 ASSAY OF MAGNESIUM: CPT

## 2023-08-12 RX ORDER — CLOPIDOGREL BISULFATE 75 MG/1
75 TABLET ORAL DAILY
Status: DISCONTINUED | OUTPATIENT
Start: 2023-08-13 | End: 2023-08-12

## 2023-08-12 RX ORDER — HEPARIN SODIUM 5000 [USP'U]/100ML
INJECTION, SOLUTION INTRAVENOUS CONTINUOUS
Status: DISCONTINUED | OUTPATIENT
Start: 2023-08-12 | End: 2023-08-13

## 2023-08-12 RX ORDER — HEPARIN SODIUM 5000 [USP'U]/100ML
0-30 INJECTION, SOLUTION INTRAVENOUS CONTINUOUS
Status: DISCONTINUED | OUTPATIENT
Start: 2023-08-12 | End: 2023-08-12

## 2023-08-12 RX ORDER — ATORVASTATIN CALCIUM 80 MG/1
80 TABLET, FILM COATED ORAL EVERY EVENING
Status: DISCONTINUED | OUTPATIENT
Start: 2023-08-12 | End: 2023-08-15 | Stop reason: HOSPADM

## 2023-08-12 RX ORDER — AMOXICILLIN 250 MG
2 CAPSULE ORAL 2 TIMES DAILY
Status: DISCONTINUED | OUTPATIENT
Start: 2023-08-12 | End: 2023-08-14

## 2023-08-12 RX ORDER — HEPARIN SODIUM 1000 [USP'U]/ML
2200 INJECTION, SOLUTION INTRAVENOUS; SUBCUTANEOUS PRN
Status: DISCONTINUED | OUTPATIENT
Start: 2023-08-12 | End: 2023-08-13

## 2023-08-12 RX ORDER — CARVEDILOL 3.12 MG/1
3.12 TABLET ORAL 2 TIMES DAILY WITH MEALS
Status: DISCONTINUED | OUTPATIENT
Start: 2023-08-13 | End: 2023-08-14

## 2023-08-12 RX ORDER — HEPARIN SODIUM 1000 [USP'U]/ML
30 INJECTION, SOLUTION INTRAVENOUS; SUBCUTANEOUS PRN
Status: DISCONTINUED | OUTPATIENT
Start: 2023-08-12 | End: 2023-08-12

## 2023-08-12 RX ORDER — ASPIRIN 81 MG/1
81 TABLET ORAL DAILY
Status: DISCONTINUED | OUTPATIENT
Start: 2023-08-13 | End: 2023-08-13

## 2023-08-12 RX ORDER — ONDANSETRON 4 MG/1
4 TABLET, ORALLY DISINTEGRATING ORAL EVERY 4 HOURS PRN
Status: DISCONTINUED | OUTPATIENT
Start: 2023-08-12 | End: 2023-08-15 | Stop reason: HOSPADM

## 2023-08-12 RX ORDER — MORPHINE SULFATE 4 MG/ML
4 INJECTION INTRAVENOUS
Status: DISCONTINUED | OUTPATIENT
Start: 2023-08-12 | End: 2023-08-15 | Stop reason: HOSPADM

## 2023-08-12 RX ORDER — HYDRALAZINE HYDROCHLORIDE 20 MG/ML
10 INJECTION INTRAMUSCULAR; INTRAVENOUS EVERY 4 HOURS PRN
Status: DISCONTINUED | OUTPATIENT
Start: 2023-08-12 | End: 2023-08-15 | Stop reason: HOSPADM

## 2023-08-12 RX ORDER — BISACODYL 10 MG
10 SUPPOSITORY, RECTAL RECTAL
Status: DISCONTINUED | OUTPATIENT
Start: 2023-08-12 | End: 2023-08-15 | Stop reason: HOSPADM

## 2023-08-12 RX ORDER — POLYETHYLENE GLYCOL 3350 17 G/17G
1 POWDER, FOR SOLUTION ORAL
Status: DISCONTINUED | OUTPATIENT
Start: 2023-08-12 | End: 2023-08-15 | Stop reason: HOSPADM

## 2023-08-12 RX ORDER — ONDANSETRON 2 MG/ML
4 INJECTION INTRAMUSCULAR; INTRAVENOUS EVERY 4 HOURS PRN
Status: DISCONTINUED | OUTPATIENT
Start: 2023-08-12 | End: 2023-08-15 | Stop reason: HOSPADM

## 2023-08-12 RX ORDER — ACETAMINOPHEN 325 MG/1
650 TABLET ORAL EVERY 6 HOURS PRN
Status: DISCONTINUED | OUTPATIENT
Start: 2023-08-12 | End: 2023-08-15 | Stop reason: HOSPADM

## 2023-08-12 RX ORDER — GUAIFENESIN/DEXTROMETHORPHAN 100-10MG/5
10 SYRUP ORAL EVERY 6 HOURS PRN
Status: DISCONTINUED | OUTPATIENT
Start: 2023-08-12 | End: 2023-08-15 | Stop reason: HOSPADM

## 2023-08-12 RX ORDER — HEPARIN SODIUM 1000 [USP'U]/ML
60 INJECTION, SOLUTION INTRAVENOUS; SUBCUTANEOUS ONCE
Status: DISCONTINUED | OUTPATIENT
Start: 2023-08-12 | End: 2023-08-12

## 2023-08-12 RX ADMIN — HEPARIN SODIUM 12 UNITS/KG/HR: 5000 INJECTION, SOLUTION INTRAVENOUS at 18:17

## 2023-08-12 RX ADMIN — HEPARIN SODIUM 700 UNITS/HR: 5000 INJECTION, SOLUTION INTRAVENOUS at 19:34

## 2023-08-12 RX ADMIN — ATORVASTATIN CALCIUM 80 MG: 80 TABLET, FILM COATED ORAL at 18:06

## 2023-08-12 RX ADMIN — MORPHINE SULFATE 4 MG: 4 INJECTION INTRAVENOUS at 21:04

## 2023-08-12 ASSESSMENT — ENCOUNTER SYMPTOMS
PSYCHIATRIC NEGATIVE: 1
RESPIRATORY NEGATIVE: 1
NEUROLOGICAL NEGATIVE: 1
CONSTITUTIONAL NEGATIVE: 1
GASTROINTESTINAL NEGATIVE: 1
CARDIOVASCULAR NEGATIVE: 1
MUSCULOSKELETAL NEGATIVE: 1
EYES NEGATIVE: 1

## 2023-08-12 ASSESSMENT — LIFESTYLE VARIABLES
HOW MANY TIMES IN THE PAST YEAR HAVE YOU HAD 5 OR MORE DRINKS IN A DAY: 0
TOTAL SCORE: 0
EVER FELT BAD OR GUILTY ABOUT YOUR DRINKING: NO
AVERAGE NUMBER OF DAYS PER WEEK YOU HAVE A DRINK CONTAINING ALCOHOL: 0
DOES PATIENT WANT TO STOP DRINKING: NO
ALCOHOL_USE: NO
ON A TYPICAL DAY WHEN YOU DRINK ALCOHOL HOW MANY DRINKS DO YOU HAVE: 0
TOTAL SCORE: 0
CONSUMPTION TOTAL: NEGATIVE
TOTAL SCORE: 0
EVER HAD A DRINK FIRST THING IN THE MORNING TO STEADY YOUR NERVES TO GET RID OF A HANGOVER: NO
HAVE YOU EVER FELT YOU SHOULD CUT DOWN ON YOUR DRINKING: NO
HAVE PEOPLE ANNOYED YOU BY CRITICIZING YOUR DRINKING: NO

## 2023-08-12 ASSESSMENT — COGNITIVE AND FUNCTIONAL STATUS - GENERAL
STANDING UP FROM CHAIR USING ARMS: A LITTLE
MOBILITY SCORE: 22
MOVING FROM LYING ON BACK TO SITTING ON SIDE OF FLAT BED: A LITTLE
TOILETING: A LITTLE
DAILY ACTIVITIY SCORE: 23
SUGGESTED CMS G CODE MODIFIER DAILY ACTIVITY: CI
SUGGESTED CMS G CODE MODIFIER MOBILITY: CJ

## 2023-08-12 ASSESSMENT — PATIENT HEALTH QUESTIONNAIRE - PHQ9
2. FEELING DOWN, DEPRESSED, IRRITABLE, OR HOPELESS: SEVERAL DAYS
9. THOUGHTS THAT YOU WOULD BE BETTER OFF DEAD, OR OF HURTING YOURSELF: NOT AT ALL
3. TROUBLE FALLING OR STAYING ASLEEP OR SLEEPING TOO MUCH: NOT AT ALL
7. TROUBLE CONCENTRATING ON THINGS, SUCH AS READING THE NEWSPAPER OR WATCHING TELEVISION: NOT AT ALL
SUM OF ALL RESPONSES TO PHQ QUESTIONS 1-9: 4
6. FEELING BAD ABOUT YOURSELF - OR THAT YOU ARE A FAILURE OR HAVE LET YOURSELF OR YOUR FAMILY DOWN: NOT AL ALL
SUM OF ALL RESPONSES TO PHQ9 QUESTIONS 1 AND 2: 1
8. MOVING OR SPEAKING SO SLOWLY THAT OTHER PEOPLE COULD HAVE NOTICED. OR THE OPPOSITE, BEING SO FIGETY OR RESTLESS THAT YOU HAVE BEEN MOVING AROUND A LOT MORE THAN USUAL: NOT AT ALL
5. POOR APPETITE OR OVEREATING: NOT AT ALL
1. LITTLE INTEREST OR PLEASURE IN DOING THINGS: NOT AT ALL
4. FEELING TIRED OR HAVING LITTLE ENERGY: NEARLY EVERY DAY

## 2023-08-12 ASSESSMENT — FIBROSIS 4 INDEX
FIB4 SCORE: 0.42
FIB4 SCORE: 0.42
FIB4 SCORE: 1.52

## 2023-08-13 ENCOUNTER — APPOINTMENT (OUTPATIENT)
Dept: CARDIOLOGY | Facility: MEDICAL CENTER | Age: 73
DRG: 251 | End: 2023-08-13
Attending: NURSE PRACTITIONER
Payer: MEDICARE

## 2023-08-13 LAB
ALBUMIN SERPL BCP-MCNC: 3.7 G/DL (ref 3.2–4.9)
ALBUMIN/GLOB SERPL: 1.5 G/DL
ALP SERPL-CCNC: 62 U/L (ref 30–99)
ALT SERPL-CCNC: 19 U/L (ref 2–50)
ANION GAP SERPL CALC-SCNC: 11 MMOL/L (ref 7–16)
APTT PPP: 142.7 SEC (ref 24.7–36)
APTT PPP: 52.4 SEC (ref 24.7–36)
AST SERPL-CCNC: 74 U/L (ref 12–45)
BASOPHILS # BLD AUTO: 0.4 % (ref 0–1.8)
BASOPHILS # BLD: 0.06 K/UL (ref 0–0.12)
BILIRUB SERPL-MCNC: 0.5 MG/DL (ref 0.1–1.5)
BUN SERPL-MCNC: 20 MG/DL (ref 8–22)
CALCIUM ALBUM COR SERPL-MCNC: 9.3 MG/DL (ref 8.5–10.5)
CALCIUM SERPL-MCNC: 9.1 MG/DL (ref 8.5–10.5)
CHLORIDE SERPL-SCNC: 105 MMOL/L (ref 96–112)
CO2 SERPL-SCNC: 19 MMOL/L (ref 20–33)
CREAT SERPL-MCNC: 0.83 MG/DL (ref 0.5–1.4)
EKG IMPRESSION: NORMAL
EOSINOPHIL # BLD AUTO: 0.17 K/UL (ref 0–0.51)
EOSINOPHIL NFR BLD: 1.2 % (ref 0–6.9)
ERYTHROCYTE [DISTWIDTH] IN BLOOD BY AUTOMATED COUNT: 47.2 FL (ref 35.9–50)
GFR SERPLBLD CREATININE-BSD FMLA CKD-EPI: 73 ML/MIN/1.73 M 2
GLOBULIN SER CALC-MCNC: 2.4 G/DL (ref 1.9–3.5)
GLUCOSE SERPL-MCNC: 90 MG/DL (ref 65–99)
HCT VFR BLD AUTO: 41.2 % (ref 37–47)
HGB BLD-MCNC: 12.8 G/DL (ref 12–16)
IMM GRANULOCYTES # BLD AUTO: 0.09 K/UL (ref 0–0.11)
IMM GRANULOCYTES NFR BLD AUTO: 0.7 % (ref 0–0.9)
LYMPHOCYTES # BLD AUTO: 2.17 K/UL (ref 1–4.8)
LYMPHOCYTES NFR BLD: 15.8 % (ref 22–41)
MCH RBC QN AUTO: 29.8 PG (ref 27–33)
MCHC RBC AUTO-ENTMCNC: 31.1 G/DL (ref 32.2–35.5)
MCV RBC AUTO: 95.8 FL (ref 81.4–97.8)
MONOCYTES # BLD AUTO: 1.22 K/UL (ref 0–0.85)
MONOCYTES NFR BLD AUTO: 8.9 % (ref 0–13.4)
NEUTROPHILS # BLD AUTO: 10.05 K/UL (ref 1.82–7.42)
NEUTROPHILS NFR BLD: 73 % (ref 44–72)
NRBC # BLD AUTO: 0 K/UL
NRBC BLD-RTO: 0 /100 WBC (ref 0–0.2)
PLATELET # BLD AUTO: 299 K/UL (ref 164–446)
PMV BLD AUTO: 10 FL (ref 9–12.9)
POTASSIUM SERPL-SCNC: 4.3 MMOL/L (ref 3.6–5.5)
PROT SERPL-MCNC: 6.1 G/DL (ref 6–8.2)
RBC # BLD AUTO: 4.3 M/UL (ref 4.2–5.4)
SODIUM SERPL-SCNC: 135 MMOL/L (ref 135–145)
TROPONIN T SERPL-MCNC: 1185 NG/L (ref 6–19)
TROPONIN T SERPL-MCNC: 850 NG/L (ref 6–19)
WBC # BLD AUTO: 13.8 K/UL (ref 4.8–10.8)

## 2023-08-13 PROCEDURE — 700111 HCHG RX REV CODE 636 W/ 250 OVERRIDE (IP): Performed by: STUDENT IN AN ORGANIZED HEALTH CARE EDUCATION/TRAINING PROGRAM

## 2023-08-13 PROCEDURE — 93458 L HRT ARTERY/VENTRICLE ANGIO: CPT | Mod: 26,59 | Performed by: INTERNAL MEDICINE

## 2023-08-13 PROCEDURE — 93005 ELECTROCARDIOGRAM TRACING: CPT | Performed by: INTERNAL MEDICINE

## 2023-08-13 PROCEDURE — A9270 NON-COVERED ITEM OR SERVICE: HCPCS | Performed by: STUDENT IN AN ORGANIZED HEALTH CARE EDUCATION/TRAINING PROGRAM

## 2023-08-13 PROCEDURE — 85025 COMPLETE CBC W/AUTO DIFF WBC: CPT

## 2023-08-13 PROCEDURE — 02703ZZ DILATION OF CORONARY ARTERY, ONE ARTERY, PERCUTANEOUS APPROACH: ICD-10-PCS | Performed by: INTERNAL MEDICINE

## 2023-08-13 PROCEDURE — 700111 HCHG RX REV CODE 636 W/ 250 OVERRIDE (IP): Mod: JZ

## 2023-08-13 PROCEDURE — 4A023N7 MEASUREMENT OF CARDIAC SAMPLING AND PRESSURE, LEFT HEART, PERCUTANEOUS APPROACH: ICD-10-PCS | Performed by: INTERNAL MEDICINE

## 2023-08-13 PROCEDURE — 36415 COLL VENOUS BLD VENIPUNCTURE: CPT

## 2023-08-13 PROCEDURE — B2151ZZ FLUOROSCOPY OF LEFT HEART USING LOW OSMOLAR CONTRAST: ICD-10-PCS | Performed by: INTERNAL MEDICINE

## 2023-08-13 PROCEDURE — 700102 HCHG RX REV CODE 250 W/ 637 OVERRIDE(OP): Performed by: STUDENT IN AN ORGANIZED HEALTH CARE EDUCATION/TRAINING PROGRAM

## 2023-08-13 PROCEDURE — 02C03ZZ EXTIRPATION OF MATTER FROM CORONARY ARTERY, ONE ARTERY, PERCUTANEOUS APPROACH: ICD-10-PCS | Performed by: INTERNAL MEDICINE

## 2023-08-13 PROCEDURE — 700102 HCHG RX REV CODE 250 W/ 637 OVERRIDE(OP)

## 2023-08-13 PROCEDURE — 84484 ASSAY OF TROPONIN QUANT: CPT | Mod: 91

## 2023-08-13 PROCEDURE — 93010 ELECTROCARDIOGRAM REPORT: CPT | Mod: 59 | Performed by: INTERNAL MEDICINE

## 2023-08-13 PROCEDURE — 99153 MOD SED SAME PHYS/QHP EA: CPT

## 2023-08-13 PROCEDURE — 99152 MOD SED SAME PHYS/QHP 5/>YRS: CPT | Performed by: INTERNAL MEDICINE

## 2023-08-13 PROCEDURE — 85730 THROMBOPLASTIN TIME PARTIAL: CPT

## 2023-08-13 PROCEDURE — 80053 COMPREHEN METABOLIC PANEL: CPT

## 2023-08-13 PROCEDURE — 700105 HCHG RX REV CODE 258: Performed by: INTERNAL MEDICINE

## 2023-08-13 PROCEDURE — 700101 HCHG RX REV CODE 250

## 2023-08-13 PROCEDURE — A9270 NON-COVERED ITEM OR SERVICE: HCPCS

## 2023-08-13 PROCEDURE — 99233 SBSQ HOSP IP/OBS HIGH 50: CPT | Performed by: STUDENT IN AN ORGANIZED HEALTH CARE EDUCATION/TRAINING PROGRAM

## 2023-08-13 PROCEDURE — 700117 HCHG RX CONTRAST REV CODE 255: Performed by: INTERNAL MEDICINE

## 2023-08-13 PROCEDURE — 770020 HCHG ROOM/CARE - TELE (206)

## 2023-08-13 PROCEDURE — 92941 PRQ TRLML REVSC TOT OCCL AMI: CPT | Mod: LD | Performed by: INTERNAL MEDICINE

## 2023-08-13 PROCEDURE — B2111ZZ FLUOROSCOPY OF MULTIPLE CORONARY ARTERIES USING LOW OSMOLAR CONTRAST: ICD-10-PCS | Performed by: INTERNAL MEDICINE

## 2023-08-13 RX ORDER — SPIRONOLACTONE 25 MG/1
12.5 TABLET ORAL DAILY
COMMUNITY
Start: 2023-07-12

## 2023-08-13 RX ORDER — VERAPAMIL HYDROCHLORIDE 2.5 MG/ML
INJECTION, SOLUTION INTRAVENOUS
Status: COMPLETED
Start: 2023-08-13 | End: 2023-08-13

## 2023-08-13 RX ORDER — LIDOCAINE HYDROCHLORIDE 20 MG/ML
INJECTION, SOLUTION INFILTRATION; PERINEURAL
Status: COMPLETED
Start: 2023-08-13 | End: 2023-08-13

## 2023-08-13 RX ORDER — SODIUM CHLORIDE 9 MG/ML
INJECTION, SOLUTION INTRAVENOUS CONTINUOUS
Status: DISCONTINUED | OUTPATIENT
Start: 2023-08-13 | End: 2023-08-13

## 2023-08-13 RX ORDER — CARVEDILOL 6.25 MG/1
6.25 TABLET ORAL 2 TIMES DAILY
COMMUNITY
Start: 2023-07-13

## 2023-08-13 RX ORDER — BIVALIRUDIN 250 MG/5ML
INJECTION, POWDER, LYOPHILIZED, FOR SOLUTION INTRAVENOUS
Status: COMPLETED
Start: 2023-08-13 | End: 2023-08-13

## 2023-08-13 RX ORDER — CLOPIDOGREL 300 MG/1
TABLET, FILM COATED ORAL
Status: COMPLETED
Start: 2023-08-13 | End: 2023-08-13

## 2023-08-13 RX ORDER — HEPARIN SODIUM 1000 [USP'U]/ML
INJECTION, SOLUTION INTRAVENOUS; SUBCUTANEOUS
Status: COMPLETED
Start: 2023-08-13 | End: 2023-08-13

## 2023-08-13 RX ORDER — MIDAZOLAM HYDROCHLORIDE 1 MG/ML
INJECTION INTRAMUSCULAR; INTRAVENOUS
Status: COMPLETED
Start: 2023-08-13 | End: 2023-08-13

## 2023-08-13 RX ORDER — HEPARIN SODIUM 200 [USP'U]/100ML
INJECTION, SOLUTION INTRAVENOUS
Status: COMPLETED
Start: 2023-08-13 | End: 2023-08-13

## 2023-08-13 RX ORDER — APIXABAN 5 MG/1
5 TABLET, FILM COATED ORAL 2 TIMES DAILY
COMMUNITY
Start: 2023-06-22

## 2023-08-13 RX ORDER — AMLODIPINE BESYLATE 5 MG/1
5 TABLET ORAL DAILY
Status: ON HOLD | COMMUNITY
Start: 2023-07-13 | End: 2023-08-15

## 2023-08-13 RX ORDER — CLOPIDOGREL 300 MG/1
600 TABLET, FILM COATED ORAL ONCE
Status: DISCONTINUED | OUTPATIENT
Start: 2023-08-13 | End: 2023-08-13

## 2023-08-13 RX ORDER — CLOPIDOGREL BISULFATE 75 MG/1
75 TABLET ORAL DAILY
Status: DISCONTINUED | OUTPATIENT
Start: 2023-08-14 | End: 2023-08-15 | Stop reason: HOSPADM

## 2023-08-13 RX ORDER — SODIUM CHLORIDE 9 MG/ML
INJECTION, SOLUTION INTRAVENOUS CONTINUOUS
Status: ACTIVE | OUTPATIENT
Start: 2023-08-13 | End: 2023-08-13

## 2023-08-13 RX ORDER — ASPIRIN 81 MG/1
81 TABLET ORAL DAILY
Status: DISCONTINUED | OUTPATIENT
Start: 2023-08-14 | End: 2023-08-15 | Stop reason: HOSPADM

## 2023-08-13 RX ADMIN — FENTANYL CITRATE 50 MCG: 50 INJECTION, SOLUTION INTRAMUSCULAR; INTRAVENOUS at 10:08

## 2023-08-13 RX ADMIN — BIVALIRUDIN 1.75 MG/KG/HR: 250 INJECTION, POWDER, LYOPHILIZED, FOR SOLUTION INTRAVENOUS at 09:51

## 2023-08-13 RX ADMIN — HEPARIN SODIUM 900 UNITS/HR: 5000 INJECTION, SOLUTION INTRAVENOUS at 02:56

## 2023-08-13 RX ADMIN — ASPIRIN 81 MG: 81 TABLET, COATED ORAL at 05:02

## 2023-08-13 RX ADMIN — LIDOCAINE HYDROCHLORIDE: 20 INJECTION, SOLUTION INFILTRATION; PERINEURAL at 09:37

## 2023-08-13 RX ADMIN — CLOPIDOGREL BISULFATE 600 MG: 300 TABLET, FILM COATED ORAL at 10:20

## 2023-08-13 RX ADMIN — CARVEDILOL 3.12 MG: 3.12 TABLET, FILM COATED ORAL at 16:55

## 2023-08-13 RX ADMIN — BIVALIRUDIN 0.2 MG/KG/HR: 250 INJECTION, POWDER, LYOPHILIZED, FOR SOLUTION INTRAVENOUS at 10:15

## 2023-08-13 RX ADMIN — HEPARIN SODIUM 2200 UNITS: 1000 INJECTION, SOLUTION INTRAVENOUS; SUBCUTANEOUS at 03:07

## 2023-08-13 RX ADMIN — ATORVASTATIN CALCIUM 80 MG: 80 TABLET, FILM COATED ORAL at 16:54

## 2023-08-13 RX ADMIN — APIXABAN 5 MG: 5 TABLET, FILM COATED ORAL at 18:07

## 2023-08-13 RX ADMIN — HEPARIN SODIUM: 1000 INJECTION, SOLUTION INTRAVENOUS; SUBCUTANEOUS at 09:47

## 2023-08-13 RX ADMIN — IOHEXOL 100 ML: 350 INJECTION, SOLUTION INTRAVENOUS at 10:10

## 2023-08-13 RX ADMIN — NITROGLYCERIN 10 ML: 20 INJECTION INTRAVENOUS at 09:37

## 2023-08-13 RX ADMIN — VERAPAMIL HYDROCHLORIDE 2.5 MG: 2.5 INJECTION, SOLUTION INTRAVENOUS at 09:37

## 2023-08-13 RX ADMIN — MIDAZOLAM 1 MG: 1 INJECTION, SOLUTION INTRAMUSCULAR; INTRAVENOUS at 10:08

## 2023-08-13 RX ADMIN — HEPARIN SODIUM 2000 UNITS: 200 INJECTION, SOLUTION INTRAVENOUS at 09:36

## 2023-08-13 RX ADMIN — CARVEDILOL 3.12 MG: 3.12 TABLET, FILM COATED ORAL at 08:23

## 2023-08-13 ASSESSMENT — PAIN DESCRIPTION - PAIN TYPE
TYPE: ACUTE PAIN
TYPE: ACUTE PAIN

## 2023-08-13 ASSESSMENT — CHA2DS2 SCORE
HYPERTENSION: YES
CHA2DS2 VASC SCORE: 6
SEX: FEMALE
DIABETES: NO
AGE 75 OR GREATER: YES
AGE 65 TO 74: NO
VASCULAR DISEASE: YES
CHF OR LEFT VENTRICULAR DYSFUNCTION: YES
PRIOR STROKE OR TIA OR THROMBOEMBOLISM: NO

## 2023-08-13 ASSESSMENT — FIBROSIS 4 INDEX: FIB4 SCORE: 4.26

## 2023-08-13 NOTE — PROCEDURES
Cardiac Catheterization report    8/13/2023  10:25 AM    REFERRING MD: Dr. Bob    INDICATION/PREOPERATIVE DIAGNOSIS:  Acute coronary syndrome  CAD status post prior PCI  Peripheral arterial disease status post prior iliac stenting  Known ischemic cardiomyopathy LVEF 35%  Atrial fibrillation  Chronic oral anticoagulation    History:  Presents with chest pain new onset and elevated troponins.  Taken urgently to cardiac catheterization laboratory early on the weekend morning for acute coronary syndrome in the context of markedly elevating troponins.    POSTOPERATIVE DIAGNOSIS:  100% thrombotically occluded apical LAD, ROSANNE 0  Patent previously placed stents with mild ISR  LVEF 35%, LVEDP 6 mmHg  Successful aspiration thrombectomy and PCI of the apical LAD with restoration of ROSANNE-3 flow and no residual stenosis.    RECOMMENDATIONS:  Guideline directed medical therapy   Cardiovascular Risk factor modification  Dual antiplatelet therapy while hospitalized in combination with OAC.  At discharge Plavix and OAC without aspirin.      PROCEDURES PERFORMED:  Coronary arteriograms  Left heart catheterization and Left ventriculogram   Supervision moderate sedation  Percutaneous coronary intervention  Aspiration thrombectomy      FINDINGS:  I.  HEMODYNAMICS   Ao: 94/63 mmHg   LEDP: 6 mmHg   Gradient on LV pullback: No    II. CORONARY ANGIOGRAPHY:  Left main coronary artery: Large caliber bifurcating no CAD  Left anterior descending artery: Moderate caliber there is a patent previously placed stent in its midportion jailing the diagonal with mild ISR.  The apical LAD is notable for a thrombotic 100% occlusion.  Postintervention there is 0% residual stenosis and ROSANNE-3 flow.  Left circumflex coronary artery: Moderate caliber mild nonobstructive CAD.  There previously placed stents in the circumflex and obtuse marginal which have mild ISR.  Right coronary artery: Moderate to large caliber supplying the RPDA with mild nonobstructive  "CAD.    III.  LEFT VENTRICULOGRAM:  LVEF DESAI PROJECTION: 35%      Procedure details:  The risks and benefits of cardiac catheterization and possible intervention were explained to the patient including death, heart attack, stroke, and emergency surgery.  The patient was brought to the cardiac catheterization lab where the right wrist was prepped and draped in the usual manner for cardiac catheterization.  The area was anesthetized with lidocaine and a 5/6 FR sheath was inserted into the right radial artery without difficulty. A Kyle catheter was advanced to the ostia of the Left coronary artery and arteriograms were recorded.   A Kyle catheter was advanced to the ostia of the right coronary artery and arteriograms were recorded. Aortic valve was crossed using Kyle catheter for left heart catheterization was performed.     Description of PCI:  The decision was made to intervene on the culprit coronary artery.  Anticoagulation was initiated as below.  The diagnostic catheter was exchanged over a wire for an 6 [place] EBU 3.5 guide seated appropriately. A 0.014\" mm  Runthrough was advanced into the artery and use to cross the lesion.  Manual aspiration thrombectomy catheter was advanced distal to the lesion and manual aspiration was applied to multiple runs.  This resulted in restoration of ROSANNE II flow.  A 2.0 mm compliant balloon was used to provide PTCA therapy to the residual apical stenosis.  There was an excellent angiographic result with ROSANNE-3 flow and no residual stenosis in the stented segment.  Due to the small size and excellent primary angioplasty result, no stent was placed.  All catheters and guidewires were removed and the patient left the cath lab in stable condition.    At the completion of the case the sheath was removed and hemostasis achieved utilizing a radial compression band patient left Cath Lab in stable condition and there were no apparent complications.    Anticoagulant: " Angiomax  Antiplatelet: Plavix (clopidogrel)  EBL <25 cc  Complications: none  Specimens: none  Contrast: 100 cc    Complications:  None apparent    Sedation time:  Moderate sedation directly monitored by me during the case while supervising the administration of the sedation medication by an independent trained RN to assist in the monitoring of the patient's level of consciousness and physiological status. I, the supervising physician was present the entire time from beginning of medication administration until the end of the procedure from 9:44 AM until 10:08 AM. For detailed administration records please see the moderate sedation documentation in the media tab.    Following the procedure I discussed the results with the patient and the patients designated contact/family member  .    Ammon óLpez MD, FACC, [facility]

## 2023-08-13 NOTE — ASSESSMENT & PLAN NOTE
7/25/2023 echocardiogram with Left Ventricle:The left ventricle is normal in size. There is normal left ventricular wall thickness. Left ventricular systolic function is severely reduced. The Ejection Fraction estimate is 25-30%. There is apical akinesis. There is severe global hypokinesis of the left ventricle.    No signs of fluid overload and patient saturating well on room air  Continue beta-blocker therapy  Pending work-up for NSTEMI  Consult cardiology

## 2023-08-13 NOTE — ASSESSMENT & PLAN NOTE
Presenting to outside facility with substernal chest pain that resolved with nitroglycerin  Troponins uptrending, 20s, 60s  History of CAD and stents 2 years ago and reports pain is similar to CAD pain 2 years ago  Received clopidogrel at outside facility per note  7/25/2023 outside echocardiogram with wall motion abnormalities  Admit to telemetry  Continue high-dose statin  Continue heparin drip  Consult Cardiology, appreciate recommendation  Trend troponins  Labs on a.m.

## 2023-08-13 NOTE — PROGRESS NOTES
4 Eyes Skin Assessment Completed by Sabas Amado, RN and Osmani Muñoz, EMT.    Head WDL  Ears WDL  Nose WDL  Mouth WDL  Neck WDL  Breast/Chest Bruising and Scab  Shoulder Blades WDL  Spine WDL  (R) Arm/Elbow/Hand Bruising, Abrasion, and Scab  (L) Arm/Elbow/Hand Bruising and Scab  Abdomen Scab and Bruising  Groin WDL  Scrotum/Coccyx/Buttocks Redness and Blanching  (R) Leg Scab and Bruising  (L) Leg Scab and Bruising  (R) Heel/Foot/Toe Bruising and Scar  (L) Heel/Foot/Toe Bruising and Scab          Devices In Places Tele Box      Interventions In Place Pillows    Possible Skin Injury No    Pictures Uploaded Into Epic N/A  Wound Consult Placed N/A  RN Wound Prevention Protocol Ordered No

## 2023-08-13 NOTE — PROGRESS NOTES
Patient arrived with heparin drip at 12units/kg/hr. Patient alert and oriented with no complaint of pain.

## 2023-08-13 NOTE — RESPIRATORY CARE
"  COPD EDUCATION by COPD CLINICAL EDUCATOR  8/13/2023 at 08:23 AM by Annabel Banda, RRT     Patient interviewed by COPD education team. Patient denies COPD (has History in EMR). Her Doctor stopped medications. She has a history of SHAHRZAD but no current CPAP use. She continues to smoke with no cessation desire at this time.      COPD Assessment  COPD Clinical Specialists ONLY  COPD Education Initiated: Yes--Short Intervention (pt enies COPD her Dr said she didnt have it and meds were stopped 3/2022;  PFT in EMR; Has SHAHRZAD but no CPAP use; continues to smoke no desire to quit)  Is this a COPD exacerbation patient?: No  Pulmonologist Name: TONA DELATORRE [place]    PFT Results  (OP) Pulmonary Function Testing: Yes (3/2022 noted FEV1-69FEV1/FVC Ratio 74 and \"Normal\" per interpretation)  Interdisciplinary Rounds: Attendance at Rounds (30 Min)      Meds to Beds  Would the patient like to opt in for Bedside Medication Delivery at Discharge?: Yes, interested  "

## 2023-08-13 NOTE — CONSULTS
Cardiology Initial Consult Note    DOS: 8/12/2023    Referring physician: Dr Carrera    Chief complaint/Reason for consult: NSTEMI    HPI: 74 y/o F with complex history including CAD s/p PCI 2015 to LAD and Lcx, CVA 4/2022, hyperlipidemia, CKD Stage III, hypertension, vitamin D deficiency, afib on Eliquis, HFrEF EF <30%, SSS s/p PPM. Presented to ED with chest pain. Found to have positive troponin and LBBB. Cardiology consulted to evaluate. Chest pain has resolved with nitro. Notes L arm pain. Had Eliquis yesterday, none today.    ROS (+ highlighted in bold):  Constitutional: Fevers/chills/fatigue/weightloss  HEENT: Blurry vision/eye pain/sore throat/hearing loss  Respiratory: Shortness of breath/cough  Cardiovascular: Chest pain/palpitations/edema/orthopnea/syncope  GI: Nausea/vomitting/diarrhea  MSK: Arthralgias/myagias/muscle weakness  Skin: Rash/sores  Neurological: Numbness/tremors/vertigo  Endocrine: Excessive thirst/polyuria/cold intolerance/heat intolerance  Psych: Depression/anxiety    Past Medical History:   Diagnosis Date    Arthritis     CHF (congestive heart failure) (HCC)     COPD (chronic obstructive pulmonary disease) (HCC)     Coronary artery disease involving native coronary artery of native heart without angina pectoris 2/15/2017    Dyslipidemia 2/15/2017    Essential hypertension, benign 2/15/2017    Hypertension     Ischemic cardiomyopathy 10/7/2015    Kidney disease     Myocardial infarct (HCC)     Peripheral vascular disease (HCC)     Rectal cancer (HCC)     10 years ago    S/P coronary artery stent placement 2/15/2017    Tobacco use        Past Surgical History:   Procedure Laterality Date    CAROTID ENDARTERECTOMY      FEMORAL POPLITEAL BYPASS      PACEMAKER INSERTION      STENT PLACEMENT      Coronary artery x3, bilateral iliac arteries       Social History     Socioeconomic History    Marital status:      Spouse name: Not on file    Number of children: Not on file    Years of  education: Not on file    Highest education level: Not on file   Occupational History    Not on file   Tobacco Use    Smoking status: Every Day     Packs/day: 1.00     Years: 20.00     Pack years: 20.00     Types: Cigarettes     Last attempt to quit: 2015     Years since quittin.8    Smokeless tobacco: Never   Vaping Use    Vaping Use: Never used   Substance and Sexual Activity    Alcohol use: Yes     Comment: occ - once a month or less    Drug use: No    Sexual activity: Not on file   Other Topics Concern    Not on file   Social History Narrative    Not on file     Social Determinants of Health     Financial Resource Strain: Not on file   Food Insecurity: Not on file   Transportation Needs: Not on file   Physical Activity: Not on file   Stress: Not on file   Social Connections: Not on file   Intimate Partner Violence: Not on file   Housing Stability: Not on file       Family History   Problem Relation Age of Onset    Heart Attack Mother        Allergies   Allergen Reactions    Chlorhexidine Rash       Current Facility-Administered Medications   Medication Dose Route Frequency Provider Last Rate Last Admin    acetaminophen (Tylenol) tablet 650 mg  650 mg Oral Q6HRS PRN Esvin Carrera M.D.        hydrALAZINE (Apresoline) injection 10 mg  10 mg Intravenous Q4HRS PRN Esvin Carrera M.D.        ondansetron (Zofran) syringe/vial injection 4 mg  4 mg Intravenous Q4HRS PRN Esvin Carrera M.D.        ondansetron (Zofran ODT) dispertab 4 mg  4 mg Oral Q4HRS PRN Esvin Carrera M.D.        senna-docusate (Pericolace Or Senokot S) 8.6-50 MG per tablet 2 Tablet  2 Tablet Oral BID Esvin Carrera M.D.        And    polyethylene glycol/lytes (Miralax) PACKET 1 Packet  1 Packet Oral QDAY PRN Esvin Carrera M.D.        And    magnesium hydroxide (Milk Of Magnesia) suspension 30 mL  30 mL Oral QDAY PRN Esvin Carrera M.D.        And    bisacodyl  "(Dulcolax) suppository 10 mg  10 mg Rectal QDAY PRN Esvin Carrera M.D.        guaiFENesin dextromethorphan (Robitussin DM) 100-10 MG/5ML syrup 10 mL  10 mL Oral Q6HRS PRN Esvin Carrera M.D.        [START ON 8/13/2023] aspirin EC tablet 81 mg  81 mg Oral DAILY Esvin Carrera M.D.        atorvastatin (Lipitor) tablet 80 mg  80 mg Oral Q EVENING Esvin Carrera M.D.   80 mg at 08/12/23 1806    heparin injection 2,200 Units  2,200 Units Intravenous PRN Esvin Carrera M.D.        And    heparin infusion 25,000 Units in 500 mL 0.45% NACL   Intravenous Continuous Esvin Carrera M.D. 14 mL/hr at 08/12/23 1934 700 Units/hr at 08/12/23 1934       Physical Exam:  Vitals:    08/12/23 1633 08/12/23 1634   BP: (!) 142/80    Pulse: 73    Resp: 16    Temp: 36.7 °C (98 °F)    TempSrc: Temporal    SpO2: 96%    Weight: 62.8 kg (138 lb 7.2 oz) 59 kg (130 lb)   Height: 1.676 m (5' 6\")      General appearance: NAD, conversant   Eyes: anicteric sclerae, moist conjunctivae; no lid-lag; PERRLA  HENT: Atraumatic; oropharynx clear with moist mucous membranes and no mucosal ulcerations; normal hard and soft palate  Neck: Trachea midline; FROM, supple, no thyromegaly or lymphadenopathy  Lungs: CTA, with normal respiratory effort and no intercostal retractions  CV: RRR, no MRGs, no JVD   Abdomen: Soft, non-tender; no masses or HSM  Extremities: No peripheral edema or extremity lymphadenopathy  Skin: Normal temperature, turgor and texture; no rash, ulcers or subcutaneous nodules  Psych: Appropriate affect, alert and oriented to person, place and time    Data:  Lipids:   Lab Results   Component Value Date/Time    CHOLSTRLTOT 142 05/03/2022 06:03 AM    TRIGLYCERIDE 92 05/03/2022 06:03 AM    HDL 52 05/03/2022 06:03 AM    LDL 72 05/03/2022 06:03 AM        BMP:  Lab Results   Component Value Date/Time    SODIUM 140 08/12/2023 1703    POTASSIUM 4.3 08/12/2023 1703    CHLORIDE 106 " 08/12/2023 1703    CO2 19 (L) 08/12/2023 1703    GLUCOSE 105 (H) 08/12/2023 1703    BUN 26 (H) 08/12/2023 1703    CREATININE 1.02 08/12/2023 1703    CALCIUM 9.4 08/12/2023 1703    ANION 15.0 08/12/2023 1703        TSH:   Lab Results   Component Value Date/Time    TSHULTRASEN 5.420 (H) 09/30/2015 2300        THYROXINE (T4):   No results found for: FREEDIR     CBC:   Lab Results   Component Value Date/Time    WBC 15.5 (H) 08/12/2023 05:03 PM    RBC 4.50 08/12/2023 05:03 PM    HEMOGLOBIN 13.6 08/12/2023 05:03 PM    HEMATOCRIT 41.6 08/12/2023 05:03 PM    MCV 92.4 08/12/2023 05:03 PM    MCH 30.2 08/12/2023 05:03 PM    MCHC 32.7 08/12/2023 05:03 PM    RDW 45.5 08/12/2023 05:03 PM    PLATELETCT 330 08/12/2023 05:03 PM    MPV 9.9 08/12/2023 05:03 PM    NEUTSPOLYS 70.60 08/12/2023 05:03 PM    LYMPHOCYTES 17.20 (L) 08/12/2023 05:03 PM    MONOCYTES 9.70 08/12/2023 05:03 PM    EOSINOPHILS 1.30 08/12/2023 05:03 PM    BASOPHILS 0.40 08/12/2023 05:03 PM    IMMGRAN 0.80 08/12/2023 05:03 PM    NRBC 0.00 08/12/2023 05:03 PM    NEUTS 10.93 (H) 08/12/2023 05:03 PM    LYMPHS 2.67 08/12/2023 05:03 PM    MONOS 1.51 (H) 08/12/2023 05:03 PM    EOS 0.20 08/12/2023 05:03 PM    BASO 0.06 08/12/2023 05:03 PM    IMMGRANAB 0.12 (H) 08/12/2023 05:03 PM    NRBCAB 0.00 08/12/2023 05:03 PM        CBC w/o DIFF  Lab Results   Component Value Date/Time    WBC 15.5 (H) 08/12/2023 05:03 PM    RBC 4.50 08/12/2023 05:03 PM    HEMOGLOBIN 13.6 08/12/2023 05:03 PM    MCV 92.4 08/12/2023 05:03 PM    MCH 30.2 08/12/2023 05:03 PM    MCHC 32.7 08/12/2023 05:03 PM    RDW 45.5 08/12/2023 05:03 PM    MPV 9.9 08/12/2023 05:03 PM       Prior echo/stress results reviewed: EF <30%    EKG interpreted by me: Atrial paced, LBBB    Impression/Plan:  Chronic systolic heart failure  CAD s/p PCI  CVA  pAF/AFL  LBBB  SSS s/p PPM  NSTEMI    - NSTEMI with no further chest pain  - Heparin gtt, ASA  - Consider LHC tomorrow, NPO p MN  - EF <30% and LBBB, consider upgrade to CRT-D  although this can be deferred until 90 days post revascularization if EF remains <35%  - Back on DOAC after potential LHC likely with Plavix and dropping ASA  - Statin and BB    Cardiology will follow      Sabas Bob MD  Cardiac Electrophysiology

## 2023-08-13 NOTE — PROGRESS NOTES
Monitor Summary:   Rhythm: Paced  Rate: 70-75  Measurement: .23/.15/.46  Ectopy: 4 beats BBB    12 hour chart check

## 2023-08-14 ENCOUNTER — HOSPITAL ENCOUNTER (OUTPATIENT)
Dept: RADIOLOGY | Facility: MEDICAL CENTER | Age: 73
End: 2023-08-14
Payer: MEDICARE

## 2023-08-14 LAB
ANION GAP SERPL CALC-SCNC: 13 MMOL/L (ref 7–16)
BUN SERPL-MCNC: 26 MG/DL (ref 8–22)
CALCIUM SERPL-MCNC: 9.2 MG/DL (ref 8.5–10.5)
CHLORIDE SERPL-SCNC: 102 MMOL/L (ref 96–112)
CO2 SERPL-SCNC: 20 MMOL/L (ref 20–33)
CREAT SERPL-MCNC: 0.94 MG/DL (ref 0.5–1.4)
ERYTHROCYTE [DISTWIDTH] IN BLOOD BY AUTOMATED COUNT: 43.6 FL (ref 35.9–50)
GFR SERPLBLD CREATININE-BSD FMLA CKD-EPI: 63 ML/MIN/1.73 M 2
GLUCOSE SERPL-MCNC: 91 MG/DL (ref 65–99)
HCT VFR BLD AUTO: 40.7 % (ref 37–47)
HGB BLD-MCNC: 13.5 G/DL (ref 12–16)
MCH RBC QN AUTO: 30.1 PG (ref 27–33)
MCHC RBC AUTO-ENTMCNC: 33.2 G/DL (ref 32.2–35.5)
MCV RBC AUTO: 90.6 FL (ref 81.4–97.8)
PLATELET # BLD AUTO: 326 K/UL (ref 164–446)
PMV BLD AUTO: 10.3 FL (ref 9–12.9)
POTASSIUM SERPL-SCNC: 4.1 MMOL/L (ref 3.6–5.5)
RBC # BLD AUTO: 4.49 M/UL (ref 4.2–5.4)
SODIUM SERPL-SCNC: 135 MMOL/L (ref 135–145)
WBC # BLD AUTO: 13.3 K/UL (ref 4.8–10.8)

## 2023-08-14 PROCEDURE — A9270 NON-COVERED ITEM OR SERVICE: HCPCS | Performed by: INTERNAL MEDICINE

## 2023-08-14 PROCEDURE — 85027 COMPLETE CBC AUTOMATED: CPT

## 2023-08-14 PROCEDURE — 700102 HCHG RX REV CODE 250 W/ 637 OVERRIDE(OP): Performed by: INTERNAL MEDICINE

## 2023-08-14 PROCEDURE — 3E0234Z INTRODUCTION OF SERUM, TOXOID AND VACCINE INTO MUSCLE, PERCUTANEOUS APPROACH: ICD-10-PCS | Performed by: FAMILY MEDICINE

## 2023-08-14 PROCEDURE — 97162 PT EVAL MOD COMPLEX 30 MIN: CPT

## 2023-08-14 PROCEDURE — 90471 IMMUNIZATION ADMIN: CPT

## 2023-08-14 PROCEDURE — A9270 NON-COVERED ITEM OR SERVICE: HCPCS | Performed by: STUDENT IN AN ORGANIZED HEALTH CARE EDUCATION/TRAINING PROGRAM

## 2023-08-14 PROCEDURE — 90677 PCV20 VACCINE IM: CPT | Performed by: STUDENT IN AN ORGANIZED HEALTH CARE EDUCATION/TRAINING PROGRAM

## 2023-08-14 PROCEDURE — 80048 BASIC METABOLIC PNL TOTAL CA: CPT

## 2023-08-14 PROCEDURE — 700102 HCHG RX REV CODE 250 W/ 637 OVERRIDE(OP): Performed by: STUDENT IN AN ORGANIZED HEALTH CARE EDUCATION/TRAINING PROGRAM

## 2023-08-14 PROCEDURE — 700111 HCHG RX REV CODE 636 W/ 250 OVERRIDE (IP): Performed by: STUDENT IN AN ORGANIZED HEALTH CARE EDUCATION/TRAINING PROGRAM

## 2023-08-14 PROCEDURE — 770020 HCHG ROOM/CARE - TELE (206)

## 2023-08-14 PROCEDURE — 99233 SBSQ HOSP IP/OBS HIGH 50: CPT | Performed by: STUDENT IN AN ORGANIZED HEALTH CARE EDUCATION/TRAINING PROGRAM

## 2023-08-14 PROCEDURE — 99232 SBSQ HOSP IP/OBS MODERATE 35: CPT | Performed by: INTERNAL MEDICINE

## 2023-08-14 RX ORDER — DAPAGLIFLOZIN 10 MG/1
10 TABLET, FILM COATED ORAL DAILY
Status: DISCONTINUED | OUTPATIENT
Start: 2023-08-14 | End: 2023-08-15

## 2023-08-14 RX ORDER — SPIRONOLACTONE 25 MG/1
25 TABLET ORAL
Status: DISCONTINUED | OUTPATIENT
Start: 2023-08-14 | End: 2023-08-15 | Stop reason: HOSPADM

## 2023-08-14 RX ORDER — SIMETHICONE 125 MG
125 TABLET,CHEWABLE ORAL 3 TIMES DAILY PRN
Status: DISCONTINUED | OUTPATIENT
Start: 2023-08-14 | End: 2023-08-15 | Stop reason: HOSPADM

## 2023-08-14 RX ORDER — CARVEDILOL 6.25 MG/1
6.25 TABLET ORAL 2 TIMES DAILY WITH MEALS
Status: DISCONTINUED | OUTPATIENT
Start: 2023-08-14 | End: 2023-08-15 | Stop reason: HOSPADM

## 2023-08-14 RX ORDER — LISINOPRIL 20 MG/1
20 TABLET ORAL EVERY EVENING
Status: DISCONTINUED | OUTPATIENT
Start: 2023-08-14 | End: 2023-08-15 | Stop reason: HOSPADM

## 2023-08-14 RX ORDER — LISINOPRIL 20 MG/1
40 TABLET ORAL DAILY
COMMUNITY

## 2023-08-14 RX ADMIN — ASPIRIN 81 MG: 81 TABLET, COATED ORAL at 05:08

## 2023-08-14 RX ADMIN — APIXABAN 5 MG: 5 TABLET, FILM COATED ORAL at 17:20

## 2023-08-14 RX ADMIN — APIXABAN 5 MG: 5 TABLET, FILM COATED ORAL at 05:08

## 2023-08-14 RX ADMIN — ATORVASTATIN CALCIUM 80 MG: 80 TABLET, FILM COATED ORAL at 17:20

## 2023-08-14 RX ADMIN — PNEUMOCOCCAL 20-VALENT CONJUGATE VACCINE 0.5 ML
2.2; 2.2; 2.2; 2.2; 2.2; 2.2; 2.2; 2.2; 2.2; 2.2; 2.2; 2.2; 2.2; 2.2; 2.2; 2.2; 4.4; 2.2; 2.2; 2.2 INJECTION, SUSPENSION INTRAMUSCULAR at 14:57

## 2023-08-14 RX ADMIN — CARVEDILOL 3.12 MG: 3.12 TABLET, FILM COATED ORAL at 08:39

## 2023-08-14 RX ADMIN — DAPAGLIFLOZIN 10 MG: 10 TABLET, FILM COATED ORAL at 10:23

## 2023-08-14 RX ADMIN — SPIRONOLACTONE 25 MG: 25 TABLET ORAL at 10:23

## 2023-08-14 RX ADMIN — CARVEDILOL 6.25 MG: 6.25 TABLET, FILM COATED ORAL at 17:21

## 2023-08-14 RX ADMIN — CLOPIDOGREL BISULFATE 75 MG: 75 TABLET ORAL at 05:07

## 2023-08-14 RX ADMIN — LISINOPRIL 20 MG: 20 TABLET ORAL at 17:20

## 2023-08-14 ASSESSMENT — GAIT ASSESSMENTS
GAIT LEVEL OF ASSIST: STANDBY ASSIST
DEVIATION: BRADYKINETIC;INCREASED BASE OF SUPPORT
ASSISTIVE DEVICE: FRONT WHEEL WALKER
DISTANCE (FEET): 200

## 2023-08-14 ASSESSMENT — PAIN DESCRIPTION - PAIN TYPE: TYPE: ACUTE PAIN

## 2023-08-14 ASSESSMENT — FIBROSIS 4 INDEX
FIB4 SCORE: 3.91
FIB4 SCORE: 3.91

## 2023-08-14 ASSESSMENT — COGNITIVE AND FUNCTIONAL STATUS - GENERAL
SUGGESTED CMS G CODE MODIFIER MOBILITY: CH
MOBILITY SCORE: 24

## 2023-08-14 NOTE — PROGRESS NOTES
Rec'd report from previous nurse regarding prior 12 hours.  POC reviewed with pt.  White board updated.  Pt verbalizes understanding.  Call light within reach.  Pt tolerated morning meds.

## 2023-08-14 NOTE — PROGRESS NOTES
"      CARDIOLOGY PROGRESS NOTE      Date: 8/14/2023      Patient Name: Ashlee Sepulveda    YOB: 1950  MRN: [id number]    Assessment/Recommendations:   # NSTEMI (s/p PTCA distal LAD), CAD (s/p prior PCI), HLD: Underwent PTCA to distal LAD on 8/13/2023 with restoration of ROSANNE III flow.  Doing well this morning with no recurrent chest pain.  -Continue apixaban 5 mg twice daily  -Continue clopidogrel 75 mg daily for at least 6-12 months if tolerated  -Discontinue aspirin on discharge  -Increase atorvastatin to 80 mg nightly given suboptimally controlled LDL    # Ischemic CM, chronic HFrEF: Most recent TTE demonstrated an EF of 25-30%.  -Continue carvedilol 6.25 mg twice daily  -Restart spironolactone at 25 mg daily dose  -Restart lisinopril at 20 mg evening dose  -Start dapagliflozin 10 mg daily  -Check BMP in AM    # Prior CVA, PAD:  -Management of risk factors as above    # AF, SSS (s/p PPM):  -As above    # CKD stage II, HTN  -Management as above    Disposition:   Cardiology will continue to follow.    Please contact us if there are any questions or concerns.    Events/Subjective:   Feels well, denies current chest pain or shortness of breath.    Medications:     Scheduled Medications   Medication Dose Frequency    aspirin  81 mg DAILY    clopidogrel  75 mg DAILY    apixaban  5 mg BID    senna-docusate  2 Tablet BID    atorvastatin  80 mg Q EVENING    carvedilol  3.125 mg BID WITH MEALS     Current Outpatient Medications   Medication Instructions    amLODIPine (NORVASC) 5 mg, Oral, DAILY    atorvastatin (LIPITOR) 40 mg, Oral, EVERY EVENING    carvedilol (COREG) 6.25 mg, Oral, 2 TIMES DAILY    Eliquis 5 mg, Oral, 2 TIMES DAILY    lisinopril (PRINIVIL) 40 mg, Oral, DAILY    spironolactone (ALDACTONE) 12.5 mg, Oral, DAILY, Half tablet = 12.5 mg     Vitals:   /69   Pulse 77   Temp 36.1 °C (97 °F) (Temporal)   Resp 16   Ht 1.676 m (5' 6\")   Wt 62.3 kg (137 lb 5.6 oz)   SpO2 95%    BP Readings from " Last 4 Encounters:   08/14/23 114/69   05/12/22 127/83   02/18/20 140/80   07/25/19 122/70     Wt Readings from Last 4 Encounters:   08/13/23 62.3 kg (137 lb 5.6 oz)   05/08/22 59 kg (130 lb)   04/12/22 60.3 kg (133 lb)   10/12/21 62.5 kg (137 lb 12.8 oz)     Body mass index is 22.17 kg/m².    Physical Examination:   General: Thin, somewhat frail, but in no acute distress  Neck: Full range of motion, no jugular venous distension  Pulmonary: Normal respiratory effort, no distress  Cardiovascular: Regular rate and rhythm  Gastrointestinal: Soft, nondistended  Extremities: Warm and well perfused, no lower extremity edema  Neurological: Alert and oriented, no gross focal motor deficits  Psychiatric: Normal affect, normal judgment    Laboratories:   Estimated Creatinine Clearance: 48.4 mL/min (by C-G formula based on SCr of 0.94 mg/dL).  Recent Labs     08/12/23 1703 08/13/23 0144 08/14/23  0218   CREATININE 1.02 0.83 0.94   BUN 26* 20 26*   POTASSIUM 4.3 4.3 4.1   SODIUM 140 135 135   CALCIUM 9.4 9.1 9.2   MAGNESIUM 2.0  --   --    CO2 19* 19* 20   ALBUMIN 4.0 3.7  --      Recent Labs     08/12/23 1703 08/13/23  0144 08/14/23  0218   GLUCOSE 105* 90 91     Recent Labs     08/12/23 1703 08/13/23  0144   ASTSGOT 25 74*   ALTSGPT 14 19   ALKPHOSPHAT 62 62   INR 1.07  --      Recent Labs     08/12/23 1703 08/13/23  0144 08/14/23  0218   WBC 15.5* 13.8* 13.3*   HEMOGLOBIN 13.6 12.8 13.5   PLATELETCT 330 299 326     Recent Labs     08/12/23  2244 08/13/23  0459 08/13/23  0855   TROPONINT 479* 850* 1185*     Lab Results   Component Value Date/Time    LDL 72 05/03/2022 0603     (H) 09/30/2015 2300     Lab Results   Component Value Date/Time    HDL 52 05/03/2022 0603    HDL 52 09/30/2015 2300       Lab Results   Component Value Date/Time    TRIGLYCERIDE 92 05/03/2022 0603    TRIGLYCERIDE 121 09/30/2015 2300       Lab Results   Component Value Date/Time    CHOLSTRLTOT 142 05/03/2022 0603    CHOLSTRLTOT 209 (H)  09/30/2015 2300     Cardiac Studies and Procedures:   I personally reviewed and interpreted the patient's coronary angiogram, which demonstrated thrombotically occluded distal LAD with restoration of ROSANNE-3 flow following PTCA with no residual severe stenosis.    I personally reviewed the patient's most recent echocardiogram report, which reported a LVEF of 25-30% with no severe valvular abnormalities.      Hao Kimble MD, FACC, [facility]  Interventional Cardiology  [facility]  [address] [place] [id number]  Phone: [phone]  Fax: [phone]

## 2023-08-14 NOTE — THERAPY
Physical Therapy   Initial Evaluation     Patient Name: Ashlee Sepulveda  Age:  73 y.o., Sex:  female  Medical Record #: [id number]  Today's Date: 8/14/2023     Precautions  Precautions: (P) Fall Risk    Assessment  Patient is 73 y.o. female with a diagnosis of NSTEMI.     Pt tolerated session well and appears close to her functional baseline at this time. She required supervision for bed mobility. She ambulated over 250' with an FWW and SBA, she also performed 4 stairs with B handrails and SBA. She does not require acute PT services at this time. She was encouraged to perform multiple bouts of ambulation daily with RN staff during hospitalization.     Pt was provided with cardiac rehab handout which was verbally reviewed and pt was educated on importance  of return to walking program. She will benefit from cardiac rehab referral.     Plan    Physical Therapy Initial Treatment Plan   Duration: (P) Evaluation only    DC Equipment Recommendations: (P) None  Discharge Recommendations: (P) Recommend outpatient physical therapy services to address higher level deficits       Subjective    Pt is pleasant and cooperative throughout session.        Objective       08/14/23 0930   Initial Contact Note    Initial Contact Note Order Received and Verified, Evaluation Only - Patient Does Not Require Further Acute Physical Therapy at this Time.  However, May Benefit from Post Acute Therapy for Higher Level Functional Deficits.   Precautions   Precautions Fall Risk   Pain   Intervention Declines   Prior Living Situation   Housing / Facility 1 Story House   Steps Into Home 4   Equipment Owned 4-Wheel Walker   Lives with - Patient's Self Care Capacity Spouse   Prior Level of Functional Mobility   Bed Mobility Independent   Transfer Status Independent   Ambulation Independent   Ambulation Distance community   Assistive Devices Used 4-Wheel Walker   Stairs Independent   Passive ROM Lower Body   Passive ROM Lower Body WDL   Active ROM  Lower Body    Active ROM Lower Body  WDL   Strength Lower Body   Lower Body Strength  WDL   Balance Assessment   Sitting Balance (Static) Good   Sitting Balance (Dynamic) Fair +   Standing Balance (Static) Fair +   Standing Balance (Dynamic) Fair   Weight Shift Sitting Good   Weight Shift Standing Fair   Bed Mobility    Supine to Sit Supervised   Gait Analysis   Gait Level Of Assist Standby Assist   Assistive Device Front Wheel Walker   Distance (Feet) 200   # of Times Distance was Traveled 1   Deviation Bradykinetic;Increased Base Of Support   # of Stairs Climbed 4   Level of Assist with Stairs Standby Assist   Functional Mobility   Sit to Stand Standby Assist   How much difficulty does the patient currently have...   Turning over in bed (including adjusting bedclothes, sheets and blankets)? 4   Sitting down on and standing up from a chair with arms (e.g., wheelchair, bedside commode, etc.) 4   Moving from lying on back to sitting on the side of the bed? 4   How much help from another person does the patient currently need...   Moving to and from a bed to a chair (including a wheelchair)? 4   Need to walk in a hospital room? 4   Climbing 3-5 steps with a railing? 4   6 clicks Mobility Score 24   Education Group   Education Provided Role of Physical Therapist;Cardiac Precautions   Cardiac Precautions Patient Response Patient;Acceptance;Handout;Verbal Demonstration;Action Demonstration;Explanation   Role of Physical Therapist Patient Response Patient;Acceptance;Explanation;Demonstration;Verbal Demonstration   Physical Therapy Initial Treatment Plan    Duration Evaluation only   Problem List    Problems Decreased Activity Tolerance   Anticipated Discharge Equipment and Recommendations   DC Equipment Recommendations None   Discharge Recommendations Recommend outpatient physical therapy services to address higher level deficits   Interdisciplinary Plan of Care Collaboration   IDT Collaboration with  Nursing   Patient  Position at End of Therapy Seated;Chair Alarm On;Phone within Reach;Tray Table within Reach;Call Light within Reach   Session Information   Date / Session Number  8/14-1 (DC Needs)

## 2023-08-14 NOTE — CARE PLAN
The patient is Stable - Low risk of patient condition declining or worsening    Shift Goals  Clinical Goals: [facility]  Patient Goals: [facility]  Family Goals: jeff    Progress made toward(s) clinical / shift goals:  Comfortable throughout shift, pt shows understanding of her POC.      Problem: Knowledge Deficit - Standard  Goal: Patient and family/care givers will demonstrate understanding of plan of care, disease process/condition, diagnostic tests and medications  Outcome: Progressing     Problem: Skin Integrity  Goal: Skin integrity is maintained or improved  Outcome: Progressing     Problem: Pain - Standard  Goal: Alleviation of pain or a reduction in pain to the patient’s comfort goal  Outcome: Progressing       Patient is not progressing towards the following goals:

## 2023-08-14 NOTE — CARE PLAN
Problem: Knowledge Deficit - Standard  Goal: Patient and family/care givers will demonstrate understanding of plan of care, disease process/condition, diagnostic tests and medications  Outcome: Progressing     Problem: Skin Integrity  Goal: Skin integrity is maintained or improved  Outcome: Progressing     Problem: Pain - Standard  Goal: Alleviation of pain or a reduction in pain to the patient’s comfort goal  Outcome: Progressing     Problem: Psychosocial  Goal: Patient's level of anxiety will decrease  Outcome: Progressing     Problem: Discharge Barriers/Planning  Goal: Patient's continuum of care needs are met  Outcome: Progressing     Problem: Infection - Standard  Goal: Patient will remain free from infection  Outcome: Progressing     Problem: Acute Care of the Cardiac Cath Patient  Goal: Pre Procedure Optimal Outcome for the Cardiac Cath Patient  8/13/2023 2239 by Kassidy Contreras R.N.  Outcome: Progressing  8/13/2023 2239 by Kassidy Contreras R.N.  Outcome: Progressing  Goal: Post Procedure Optimal Outcome for the Cardiac Cath Patient  8/13/2023 2239 by Kassidy Contreras [email or web address]  Outcome: Progressing  8/13/2023 2239 by Kassidy Contreras R.N.  Outcome: Progressing   The patient is Stable - Low risk of patient condition declining or worsening    Shift Goals  Clinical Goals: tele monitor  Patient Goals: D/C  Family Goals: jeff    Progress made toward(s) clinical / shift goals:  improving    Patient is not progressing towards the following goals:

## 2023-08-14 NOTE — PROGRESS NOTES
Monitor Summary:    Rhythm: 100% Paced  Rate: 72-75  Ectopy: PVC coup  Measurement : -/.11/-

## 2023-08-14 NOTE — PROGRESS NOTES
Received bedside report from robert Yi care of patient mid-shift. This RN agrees with charted assessment.

## 2023-08-14 NOTE — HOSPITAL COURSE
73-year-old female with a past medical history of right frontal and parietal watershed CVA 4/2022, hyperlipidemia, CKD Stage III, hypertension, vitamin D deficiency, afib on Eliquis, HFrEF (7/25/2023 TTE: Left Ventricle:The left ventricle is normal in size. There is normal left ventricular wall thickness. Left ventricular systolic function is severely reduced. The Ejection Fraction estimate is 25-30%. There is apical akinesis. There is severe global hypokinesis of the left ventricle), Pacemaker, CAD status post stents x3 2 years ago presented to outside facility emergency department with substernal chest pain.  Patient reports that she start experiencing substernal chest pain today while she was sitting and described as oppressive and severe in intensity.  Patient reports that pain was similar to that of when she had a heart attack years ago which prompted her to go to emergency department and took a baby aspirin prior to.  Pain resolved with nitroglycerin.  Associated symptoms with dyspnea and left upper extremity numbness/tingling.     At the outside facility patient was noted for stable vitals and saturating well on room air.  CBC with leukocytosis and patient noted for elevated troponins at 28, 66.  Noted for new left bundle branch block as per transfer notes.  Patient underwent chest CTA without signs of PE.  Patient received Plavix and was started on IV heparin.  Patient was admitted for NSTEMI requiring heparin drip as well as urgent cardiology evaluation and further work-up and management.

## 2023-08-14 NOTE — PROGRESS NOTES
Hospital Medicine Daily Progress Note    Date of Service  8/13/2023    Chief Complaint  Ashlee Sepulveda is a 73 y.o. female admitted 8/12/2023 with chest pain.    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 There is normal left ventricular wall thickness. Left ventricular systolic function is severely reduced. The Ejection Fraction estimate is 25-30%. There is apical akinesis. There is severe global hypokinesis of the left ventricle), Pacemaker, CAD status post stents x3 2 years ago presented to outside facility emergency department with substernal chest pain.  Patient reports that she start experiencing substernal chest pain today while she was sitting and described as oppressive and severe in intensity.  Patient reports that pain was similar to that of when she had a heart attack years ago which prompted her to go to emergency department and took a baby aspirin prior to.  Pain resolved with nitroglycerin.  Associated symptoms with dyspnea and left upper extremity numbness/tingling.     At the outside facility patient was noted for stable vitals and saturating well on room air.  CBC with leukocytosis and patient noted for elevated troponins at 28, 66.  Noted for new left bundle branch block as per transfer notes.  Patient underwent chest CTA without signs of PE.  Patient received Plavix and was started on IV heparin.  Patient was admitted for NSTEMI requiring heparin drip as well as urgent cardiology evaluation and further work-up and management.    Interval Problem Update  8/13  Examined in her inpatient room.  Afebrile HR 65-95, -147, RR 14-18. SpO2 94-97%.  WBC 13.8, bicarb 19, AST 74, trop from 160->1185.  Went for [facility] with Successful aspiration thrombectomy and PCI of the  apical LAD with restoration of ROSANNE-3 flow and no residual stenosis.  Tolerated well no complications.   On Asa, plavix, I restarted eliquis. Triple therapy for now, will confirm with cardiology if needed triple therapy for a month and drop asa then or if we drop it now.  No complaints.     I have discussed this patient's plan of care and discharge plan at IDT rounds today with Case Management, Nursing, Nursing leadership, and other members of the IDT team.    Consultants/Specialty  cardiology    Code Status  Full Code    Disposition  The patient is not medically cleared for discharge to home or a post-acute facility.      I have placed the appropriate orders for post-discharge needs.    Review of Systems  ROS   Review of Systems   Constitutional: Negative.    HENT: Negative.     Eyes: Negative.    Respiratory: + dyspnea.     Cardiovascular: +chest pain.    Gastrointestinal: Negative.    Genitourinary: Negative.    Musculoskeletal: Negative.    Skin: Negative.    Neurological: Negative.    Endo/Heme/Allergies: Negative.    Psychiatric/Behavioral: Negative.     Physical Exam  Temp:  [36.3 °C (97.3 °F)-37.3 °C (99.1 °F)] 36.3 °C (97.4 °F)  Pulse:  [65-95] 73  Resp:  [14-18] 16  BP: (111-147)/(47-97) 111/63  SpO2:  [94 %-97 %] 94 %    Physical Exam  Constitutional:       Appearance: Normal appearance. She is not ill-appearing. Pleasant mood, no distress  HENT:      Head: Normocephalic and atraumatic.      Mouth/Throat:      Mouth: Mucous membranes are moist.   Eyes:      Extraocular Movements: Extraocular movements intact.      Pupils: Pupils are equal, round, and reactive to light.   Cardiovascular:      Rate and Rhythm: Normal rate. Rhythm irregular.      Pulses: Normal pulses.      Heart sounds: Normal heart sounds.   Pulmonary:      Effort: Pulmonary effort is normal.      Breath sounds: Normal breath sounds.   Abdominal:      General: Bowel sounds are normal. There is no distension.      Palpations: Abdomen is  soft.      Tenderness: There is no abdominal tenderness. There is no guarding or rebound.   Musculoskeletal:         General: No swelling. Normal range of motion.      Cervical back: Normal range of motion and neck supple.      Right lower leg: No edema.      Left lower leg: No edema.   Skin:     General: Skin is warm.      Coloration: Skin is not jaundiced.   Neurological:      General: No focal deficit present.      Mental Status: She is alert and oriented to person, place, and time. Mental status is at baseline.      Cranial Nerves: No cranial nerve deficit.   Psychiatric:         Mood and Affect: Mood normal.         Behavior: Behavior normal.         Thought Content: Thought content normal.         Judgment: Judgment normal.     Fluids  No intake or output data in the 24 hours ending 08/14/23 0204    Laboratory  Recent Labs     08/12/23  1703 08/13/23  0144   WBC 15.5* 13.8*   RBC 4.50 4.30   HEMOGLOBIN 13.6 12.8   HEMATOCRIT 41.6 41.2   MCV 92.4 95.8   MCH 30.2 29.8   MCHC 32.7 31.1*   RDW 45.5 47.2   PLATELETCT 330 299   MPV 9.9 10.0     Recent Labs     08/12/23 1703 08/13/23  0144   SODIUM 140 135   POTASSIUM 4.3 4.3   CHLORIDE 106 105   CO2 19* 19*   GLUCOSE 105* 90   BUN 26* 20   CREATININE 1.02 0.83   CALCIUM 9.4 9.1     Recent Labs     08/12/23 1703 08/13/23  0144 08/13/23  0855   APTT 74.2* 52.4* 142.7*   INR 1.07  --   --                Imaging  CL-LEFT HEART CATHETERIZATION WITH POSSIBLE INTERVENTION    (Results Pending)        Assessment/Plan  * NSTEMI (non-ST elevated myocardial infarction) (HCC)- (present on admission)  Assessment & Plan  Presenting to outside facility with substernal chest pain that resolved with nitroglycerin  Troponins uptrending, 20s, 60s  History of CAD and stents 2 years ago and reports pain is similar to CAD pain 2 years ago  Received clopidogrel at outside facility per note  7/25/2023 outside echocardiogram with wall motion abnormalities  Admit to telemetry  Continue  high-dose statin  Continue heparin drip  Consult Cardiology, appreciate recommendation  Trend troponins  Labs on a.m.    HFrEF (heart failure with reduced ejection fraction) (HCC)- (present on admission)  Assessment & Plan  7/25/2023 echocardiogram with Left Ventricle:The left ventricle is normal in size. There is normal left ventricular wall thickness. Left ventricular systolic function is severely reduced. The Ejection Fraction estimate is 25-30%. There is apical akinesis. There is severe global hypokinesis of the left ventricle.    No signs of fluid overload and patient saturating well on room air  Continue beta-blocker therapy  Pending work-up for NSTEMI  Consult cardiology    CKD (chronic kidney disease)- (present on admission)  Assessment & Plan  History of  Pending labs  Awaiting for tox  Renal dose meds  Labs in a.m.    Atrial fibrillation (HCC)- (present on admission)  Assessment & Plan  Hold Eliquis for now  On heparin drip for NSTEMI  Currently rate controlled    Panlobular emphysema (HCC)- (present on admission)  Assessment & Plan  Not on acute COPD exacerbation  Supportive management  Labs in a.m.    Obstructive sleep apnea syndrome- (present on admission)  Assessment & Plan  Nocturnal CPAP    Dyslipidemia- (present on admission)  Assessment & Plan  Continue high-dose statin    Essential hypertension- (present on admission)  Assessment & Plan  As needed antihypertensives         VTE prophylaxis:   SCDs/TEDs   therapeutic anticoagulation with eliquis 5 mg BID      I have performed a physical exam and reviewed and updated ROS and Plan today (8/13/2023). In review of yesterday's note (8/12/2023), there are no changes except as documented above.

## 2023-08-15 ENCOUNTER — PHARMACY VISIT (OUTPATIENT)
Dept: PHARMACY | Facility: MEDICAL CENTER | Age: 73
End: 2023-08-15
Payer: MEDICARE

## 2023-08-15 VITALS
HEIGHT: 66 IN | HEART RATE: 73 BPM | BODY MASS INDEX: 20.51 KG/M2 | TEMPERATURE: 98.1 F | SYSTOLIC BLOOD PRESSURE: 106 MMHG | RESPIRATION RATE: 18 BRPM | OXYGEN SATURATION: 95 % | DIASTOLIC BLOOD PRESSURE: 60 MMHG | WEIGHT: 127.65 LBS

## 2023-08-15 LAB
ANION GAP SERPL CALC-SCNC: 11 MMOL/L (ref 7–16)
ANION GAP SERPL CALC-SCNC: 12 MMOL/L (ref 7–16)
BUN SERPL-MCNC: 34 MG/DL (ref 8–22)
BUN SERPL-MCNC: 35 MG/DL (ref 8–22)
CALCIUM SERPL-MCNC: 8.7 MG/DL (ref 8.5–10.5)
CALCIUM SERPL-MCNC: 8.8 MG/DL (ref 8.5–10.5)
CHLORIDE SERPL-SCNC: 108 MMOL/L (ref 96–112)
CHLORIDE SERPL-SCNC: 110 MMOL/L (ref 96–112)
CO2 SERPL-SCNC: 18 MMOL/L (ref 20–33)
CO2 SERPL-SCNC: 20 MMOL/L (ref 20–33)
CREAT SERPL-MCNC: 1.24 MG/DL (ref 0.5–1.4)
CREAT SERPL-MCNC: 1.35 MG/DL (ref 0.5–1.4)
GFR SERPLBLD CREATININE-BSD FMLA CKD-EPI: 41 ML/MIN/1.73 M 2
GFR SERPLBLD CREATININE-BSD FMLA CKD-EPI: 45 ML/MIN/1.73 M 2
GLUCOSE SERPL-MCNC: 107 MG/DL (ref 65–99)
GLUCOSE SERPL-MCNC: 114 MG/DL (ref 65–99)
POTASSIUM SERPL-SCNC: 3.9 MMOL/L (ref 3.6–5.5)
POTASSIUM SERPL-SCNC: 4 MMOL/L (ref 3.6–5.5)
SODIUM SERPL-SCNC: 139 MMOL/L (ref 135–145)
SODIUM SERPL-SCNC: 140 MMOL/L (ref 135–145)

## 2023-08-15 PROCEDURE — A9270 NON-COVERED ITEM OR SERVICE: HCPCS | Performed by: INTERNAL MEDICINE

## 2023-08-15 PROCEDURE — 99232 SBSQ HOSP IP/OBS MODERATE 35: CPT | Performed by: INTERNAL MEDICINE

## 2023-08-15 PROCEDURE — 700102 HCHG RX REV CODE 250 W/ 637 OVERRIDE(OP): Performed by: INTERNAL MEDICINE

## 2023-08-15 PROCEDURE — 80048 BASIC METABOLIC PNL TOTAL CA: CPT

## 2023-08-15 PROCEDURE — 700102 HCHG RX REV CODE 250 W/ 637 OVERRIDE(OP)

## 2023-08-15 PROCEDURE — 99239 HOSP IP/OBS DSCHRG MGMT >30: CPT | Performed by: INTERNAL MEDICINE

## 2023-08-15 PROCEDURE — 700102 HCHG RX REV CODE 250 W/ 637 OVERRIDE(OP): Performed by: STUDENT IN AN ORGANIZED HEALTH CARE EDUCATION/TRAINING PROGRAM

## 2023-08-15 PROCEDURE — RXMED WILLOW AMBULATORY MEDICATION CHARGE: Performed by: INTERNAL MEDICINE

## 2023-08-15 PROCEDURE — A9270 NON-COVERED ITEM OR SERVICE: HCPCS

## 2023-08-15 PROCEDURE — 700105 HCHG RX REV CODE 258: Performed by: INTERNAL MEDICINE

## 2023-08-15 PROCEDURE — A9270 NON-COVERED ITEM OR SERVICE: HCPCS | Performed by: STUDENT IN AN ORGANIZED HEALTH CARE EDUCATION/TRAINING PROGRAM

## 2023-08-15 RX ORDER — ASPIRIN 81 MG/1
81 TABLET ORAL DAILY
Qty: 100 TABLET | Refills: 0 | Status: SHIPPED | OUTPATIENT
Start: 2023-08-16 | End: 2023-08-15

## 2023-08-15 RX ORDER — CLOPIDOGREL BISULFATE 75 MG/1
75 TABLET ORAL DAILY
Qty: 30 TABLET | Refills: 0 | Status: SHIPPED | OUTPATIENT
Start: 2023-08-16

## 2023-08-15 RX ORDER — SODIUM CHLORIDE 9 MG/ML
INJECTION, SOLUTION INTRAVENOUS CONTINUOUS
Status: ACTIVE | OUTPATIENT
Start: 2023-08-15 | End: 2023-08-15

## 2023-08-15 RX ADMIN — SIMETHICONE 125 MG: 125 TABLET, CHEWABLE ORAL at 08:22

## 2023-08-15 RX ADMIN — SPIRONOLACTONE 25 MG: 25 TABLET ORAL at 04:03

## 2023-08-15 RX ADMIN — SODIUM CHLORIDE: 9 INJECTION, SOLUTION INTRAVENOUS at 10:00

## 2023-08-15 RX ADMIN — APIXABAN 5 MG: 5 TABLET, FILM COATED ORAL at 04:03

## 2023-08-15 RX ADMIN — CARVEDILOL 6.25 MG: 6.25 TABLET, FILM COATED ORAL at 08:18

## 2023-08-15 RX ADMIN — DAPAGLIFLOZIN 10 MG: 10 TABLET, FILM COATED ORAL at 04:03

## 2023-08-15 RX ADMIN — ASPIRIN 81 MG: 81 TABLET, COATED ORAL at 04:03

## 2023-08-15 RX ADMIN — CLOPIDOGREL BISULFATE 75 MG: 75 TABLET ORAL at 04:03

## 2023-08-15 RX ADMIN — PSYLLIUM HUSK 1 PACKET: 3.4 POWDER ORAL at 00:26

## 2023-08-15 ASSESSMENT — PAIN DESCRIPTION - PAIN TYPE: TYPE: ACUTE PAIN

## 2023-08-15 NOTE — PROGRESS NOTES
Patient has been medically cleared for discharge.   One medication via [id number] waiting.   Patient has  at bedside, she will need to do f/u BMP labs before Friday per Dr Monet.   Order placed for discharge lounge

## 2023-08-15 NOTE — CARE PLAN
The patient is Stable - Low risk of patient condition declining or worsening    Shift Goals  Clinical Goals: medication education, discharge  Patient Goals: discharge  Family Goals: ELANA    Progress made toward(s) clinical / shift goals:  HF meds optimized and education provided, potential for discharge tomorrow.    Patient is not progressing towards the following goals:      Problem: Skin Integrity  Goal: Skin integrity is maintained or improved  Outcome: Progressing     Problem: Pain - Standard  Goal: Alleviation of pain or a reduction in pain to the patient’s comfort goal  Outcome: Progressing     Problem: Care Map:  Day 3 Optimal Outcome for the Heart Failure Patient  Goal: Day 3:  Optimal Care of the heart failure patient  Outcome: Progressing

## 2023-08-15 NOTE — PROGRESS NOTES
Hospital Medicine Daily Progress Note    Date of Service  8/14/2023    Chief Complaint  Ashlee Sepulveda is a 73 y.o. female admitted 8/12/2023 with chest pain.    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 There is normal left ventricular wall thickness. Left ventricular systolic function is severely reduced. The Ejection Fraction estimate is 25-30%. There is apical akinesis. There is severe global hypokinesis of the left ventricle), Pacemaker, CAD status post stents x3 2 years ago presented to outside facility emergency department with substernal chest pain.  Patient reports that she start experiencing substernal chest pain today while she was sitting and described as oppressive and severe in intensity.  Patient reports that pain was similar to that of when she had a heart attack years ago which prompted her to go to emergency department and took a baby aspirin prior to.  Pain resolved with nitroglycerin.  Associated symptoms with dyspnea and left upper extremity numbness/tingling.     At the outside facility patient was noted for stable vitals and saturating well on room air.  CBC with leukocytosis and patient noted for elevated troponins at 28, 66.  Noted for new left bundle branch block as per transfer notes.  Patient underwent chest CTA without signs of PE.  Patient received Plavix and was started on IV heparin.  Patient was admitted for NSTEMI requiring heparin drip as well as urgent cardiology evaluation and further work-up and management.    Interval Problem Update  8/13  Examined in her inpatient room.  Afebrile HR 65-95, -147, RR 14-18. SpO2 94-97%.  WBC 13.8, bicarb 19, AST 74, trop from 160->1185.  Went for [facility] with Successful aspiration thrombectomy and PCI of the  apical LAD with restoration of ROSANNE-3 flow and no residual stenosis.  Tolerated well no complications.   On Asa, plavix, I restarted eliquis. Triple therapy for now, will confirm with cardiology if needed triple therapy for a month and drop asa then or if we drop it now.  No complaints.     8/14  Evaluated this AM, no acute complaints, feeling well.  Cardiology has further optimized GDMT, will watch overnight and watch response to the medications. Mild leukocytosis persists, BMP unremarkable. Patient tolerating PO intake. If stable overnight can likely DC in AM.    I have discussed this patient's plan of care and discharge plan at IDT rounds today with Case Management, Nursing, Nursing leadership, and other members of the IDT team.    Consultants/Specialty  cardiology    Code Status  Full Code    Disposition  The patient is not medically cleared for discharge to home or a post-acute facility.      I have placed the appropriate orders for post-discharge needs.    Review of Systems  ROS   Review of Systems   Constitutional: Negative.    HENT: Negative.     Eyes: Negative.    Respiratory: + dyspnea.     Cardiovascular: +chest pain.    Gastrointestinal: Negative.    Genitourinary: Negative.    Musculoskeletal: Negative.    Skin: Negative.    Neurological: Negative.    Endo/Heme/Allergies: Negative.    Psychiatric/Behavioral: Negative.     Physical Exam  Temp:  [36.1 °C (97 °F)-36.6 °C (97.9 °F)] 36.5 °C (97.7 °F)  Pulse:  [70-77] 70  Resp:  [16-17] 17  BP: ()/(60-74) 115/66  SpO2:  [94 %-95 %] 94 %    Physical Exam  Constitutional:       Appearance: Normal appearance. She is not ill-appearing. Pleasant mood, no distress  HENT:      Head: Normocephalic and atraumatic.      Mouth/Throat:      Mouth: Mucous membranes are moist.   Eyes:      Extraocular Movements: Extraocular movements intact.      Pupils: Pupils are equal, round, and reactive to light.   Cardiovascular:      Rate and Rhythm: Normal rate. Rhythm  irregular.      Pulses: Normal pulses.      Heart sounds: Normal heart sounds.   Pulmonary:      Effort: Pulmonary effort is normal.      Breath sounds: Normal breath sounds.   Abdominal:      General: Bowel sounds are normal. There is no distension.      Palpations: Abdomen is soft.      Tenderness: There is no abdominal tenderness. There is no guarding or rebound.   Musculoskeletal:         General: No swelling. Normal range of motion.      Cervical back: Normal range of motion and neck supple.      Right lower leg: No edema.      Left lower leg: No edema.   Skin:     General: Skin is warm.      Coloration: Skin is not jaundiced.   Neurological:      General: No focal deficit present.      Mental Status: She is alert and oriented to person, place, and time. Mental status is at baseline.      Cranial Nerves: No cranial nerve deficit.   Psychiatric:         Mood and Affect: Mood normal.         Behavior: Behavior normal.         Thought Content: Thought content normal.         Judgment: Judgment normal.     Fluids    Intake/Output Summary (Last 24 hours) at 8/15/2023 0628  Last data filed at 8/14/2023 2000  Gross per 24 hour   Intake 780 ml   Output 250 ml   Net 530 ml       Laboratory  Recent Labs     08/12/23 1703 08/13/23  0144 08/14/23  0218   WBC 15.5* 13.8* 13.3*   RBC 4.50 4.30 4.49   HEMOGLOBIN 13.6 12.8 13.5   HEMATOCRIT 41.6 41.2 40.7   MCV 92.4 95.8 90.6   MCH 30.2 29.8 30.1   MCHC 32.7 31.1* 33.2   RDW 45.5 47.2 43.6   PLATELETCT 330 299 326   MPV 9.9 10.0 10.3       Recent Labs     08/12/23  1703 08/13/23  0144 08/14/23  0218   SODIUM 140 135 135   POTASSIUM 4.3 4.3 4.1   CHLORIDE 106 105 102   CO2 19* 19* 20   GLUCOSE 105* 90 91   BUN 26* 20 26*   CREATININE 1.02 0.83 0.94   CALCIUM 9.4 9.1 9.2       Recent Labs     08/12/23  1703 08/13/23  0144 08/13/23  0855   APTT 74.2* 52.4* 142.7*   INR 1.07  --   --                  Imaging  CL-LEFT HEART CATHETERIZATION WITH POSSIBLE INTERVENTION    (Results  Pending)          Assessment/Plan  * NSTEMI (non-ST elevated myocardial infarction) (HCC)- (present on admission)  Assessment & Plan  Presenting to outside facility with substernal chest pain that resolved with nitroglycerin  Troponins uptrending, 20s, 60s  History of CAD and stents 2 years ago and reports pain is similar to CAD pain 2 years ago  Received clopidogrel at outside facility per note  7/25/2023 outside echocardiogram with wall motion abnormalities  Admit to telemetry  Continue high-dose statin  Continue heparin drip  Consult Cardiology, appreciate recommendation  Trend troponins  Labs on a.m.    HFrEF (heart failure with reduced ejection fraction) (HCC)- (present on admission)  Assessment & Plan  7/25/2023 echocardiogram with Left Ventricle:The left ventricle is normal in size. There is normal left ventricular wall thickness. Left ventricular systolic function is severely reduced. The Ejection Fraction estimate is 25-30%. There is apical akinesis. There is severe global hypokinesis of the left ventricle.    No signs of fluid overload and patient saturating well on room air  Continue beta-blocker therapy  Pending work-up for NSTEMI  Consult cardiology    CKD (chronic kidney disease)- (present on admission)  Assessment & Plan  History of  Pending labs  Awaiting for tox  Renal dose meds  Labs in a.m.    Atrial fibrillation (HCC)- (present on admission)  Assessment & Plan  Hold Eliquis for now  On heparin drip for NSTEMI  Currently rate controlled    Panlobular emphysema (HCC)- (present on admission)  Assessment & Plan  Not on acute COPD exacerbation  Supportive management  Labs in a.m.    Obstructive sleep apnea syndrome- (present on admission)  Assessment & Plan  Nocturnal CPAP    Dyslipidemia- (present on admission)  Assessment & Plan  Continue high-dose statin    Essential hypertension- (present on admission)  Assessment & Plan  As needed antihypertensives         VTE prophylaxis:   SCDs/TEDs    therapeutic anticoagulation with eliquis 5 mg BID      I have performed a physical exam and reviewed and updated ROS and Plan today (8/14/2023). In review of yesterday's note (8/13/2023), there are no changes except as documented above.

## 2023-08-15 NOTE — PROGRESS NOTES
CARDIOLOGY PROGRESS NOTE      Date: 8/15/2023      Patient Name: Ashlee Sepulveda    YOB: 1950  MRN: [id number]    Assessment/Recommendations:   # NSTEMI (s/p PTCA distal LAD), CAD (s/p prior PCI), HLD: Underwent PTCA to distal LAD on 8/13/2023 with restoration of ROSANEN III flow.  Doing well this morning with no recurrent chest pain.  -Continue apixaban 5 mg twice daily  -Continue clopidogrel 75 mg daily for at least 6-12 months if tolerated  -Discontinue aspirin on discharge  -Continue atorvastatin to 80 mg nightly given suboptimally controlled LDL on 40 mg dose    # Ischemic CM, chronic HFrEF: Most recent TTE demonstrated an EF of 25-30%. Slight increase in Cr this AM following likely due to medication effects/mild hypovolemia vs MARY ANN  -Discontinue dapagliflozin  -500 ml NS bolues  -Continue carvedilol 6.25 mg twice daily  -Continue spironolactone 25 mg daily dose  -Continue lisinopril 20 mg dose  -Repeat BMP in afternoon    # Mild DEBBI, CKD stage II/IIIa  -Management as above    # HTN, Prior CVA, PAD:  -Management as above    # AF, SSS (s/p PPM):  -As above    Disposition:   Will give fluid bolus and repeat BMP in afternoon, if renal function improves then she can likely discharge with a repeat BMP in 2 days as an outpatient. If not improving, then may need to watch overnight and hold lisinopril.    Please contact us if there are any questions or concerns.    Events/Subjective:   Feels well, denies current chest pain or shortness of breath.    Medications:     Scheduled Medications   Medication Dose Frequency    carvedilol  6.25 mg BID WITH MEALS    spironolactone  25 mg Q DAY    lisinopril  20 mg Q EVENING    psyllium  1 Packet DAILY    aspirin  81 mg DAILY    clopidogrel  75 mg DAILY    apixaban  5 mg BID    atorvastatin  80 mg Q EVENING     Current Outpatient Medications   Medication Instructions    amLODIPine (NORVASC) 5 mg, Oral, DAILY    atorvastatin (LIPITOR) 40 mg, Oral, EVERY EVENING     "carvedilol (COREG) 6.25 mg, Oral, 2 TIMES DAILY    Eliquis 5 mg, Oral, 2 TIMES DAILY    lisinopril (PRINIVIL) 40 mg, Oral, DAILY    spironolactone (ALDACTONE) 12.5 mg, Oral, DAILY, Half tablet = 12.5 mg     Vitals:   BP 92/59   Pulse 66   Temp 36.3 °C (97.3 °F) (Temporal)   Resp 16   Ht 1.676 m (5' 6\")   Wt 57.9 kg (127 lb 10.3 oz)   SpO2 93%    BP Readings from Last 4 Encounters:   08/15/23 92/59   05/12/22 127/83   02/18/20 140/80   07/25/19 122/70     Wt Readings from Last 4 Encounters:   08/14/23 57.9 kg (127 lb 10.3 oz)   05/08/22 59 kg (130 lb)   04/12/22 60.3 kg (133 lb)   10/12/21 62.5 kg (137 lb 12.8 oz)     Body mass index is 20.6 kg/m².    Physical Examination:   General: Thin, somewhat frail, but in no acute distress  Neck: Full range of motion, no jugular venous distension  Pulmonary: Normal respiratory effort, no distress  Cardiovascular: Regular rate and rhythm  Gastrointestinal: Soft, nondistended  Extremities: Warm and well perfused, no lower extremity edema  Neurological: Alert and oriented, no gross focal motor deficits  Psychiatric: Normal affect, normal judgment    Laboratories:   Estimated Creatinine Clearance: 32.9 mL/min (by C-G formula based on SCr of 1.35 mg/dL).  Recent Labs     08/12/23  1703 08/13/23  0144 08/14/23  0218 08/15/23  0825   CREATININE 1.02 0.83 0.94 1.35   BUN 26* 20 26* 34*   POTASSIUM 4.3 4.3 4.1 4.0   SODIUM 140 135 135 140   CALCIUM 9.4 9.1 9.2 8.8   MAGNESIUM 2.0  --   --   --    CO2 19* 19* 20 20   ALBUMIN 4.0 3.7  --   --        Recent Labs     08/13/23 0144 08/14/23  0218 08/15/23  0825   GLUCOSE 90 91 114*       Recent Labs     08/12/23  1703 08/13/23  0144   ASTSGOT 25 74*   ALTSGPT 14 19   ALKPHOSPHAT 62 62   INR 1.07  --        Recent Labs     08/12/23  1703 08/13/23  0144 08/14/23  0218   WBC 15.5* 13.8* 13.3*   HEMOGLOBIN 13.6 12.8 13.5   PLATELETCT 330 299 326       Recent Labs     08/12/23  2244 08/13/23  0459 08/13/23  0855   TROPONINT 479* 850* " 1185*       Lab Results   Component Value Date/Time    LDL 72 05/03/2022 0603     (H) 09/30/2015 2300     Lab Results   Component Value Date/Time    HDL 52 05/03/2022 0603    HDL 52 09/30/2015 2300       Lab Results   Component Value Date/Time    TRIGLYCERIDE 92 05/03/2022 0603    TRIGLYCERIDE 121 09/30/2015 2300       Lab Results   Component Value Date/Time    CHOLSTRLTOT 142 05/03/2022 0603    CHOLSTRLTOT 209 (H) 09/30/2015 2300     Cardiac Studies and Procedures:   I personally reviewed and interpreted the patient's coronary angiogram, which demonstrated thrombotically occluded distal LAD with restoration of ROSANNE-3 flow following PTCA with no residual severe stenosis.    I personally reviewed the patient's most recent echocardiogram report, which reported a LVEF of 25-30% with no severe valvular abnormalities.      Hao Kimble MD, FACC, [facility]  Interventional Cardiology  [facility], [address]  [address] [place] [id number]  Phone: [phone]  Fax: [phone]

## 2023-08-15 NOTE — DIETARY
Nutrition Services: Diet Education Consult   Day 3 of admit.  Ashlee Sepulveda is a 73 y.o. female with admitting DX of NSTEMI (non-ST elevated myocardial infarction) ([facility]) [I21.4]    RD able to visit pt at bedside to provide heart healthy diet education. RD discussed adequate fiber, sodium, saturated fat, and essential fatty acid consumption. RD provided handout reinforcing topics discussed. Pt demonstrated readiness and evidence of learning. RD able to answer all questions to patient's satisfaction.     No other education needs identified at this time. Consider referral to outpatient nutrition services for continuation of education as indicated or per pt preferences.     Please re-consult RD as indicated.

## 2023-08-15 NOTE — CARE PLAN
Problem: Knowledge Deficit - Standard  Goal: Patient and family/care givers will demonstrate understanding of plan of care, disease process/condition, diagnostic tests and medications  Outcome: Progressing     Problem: Skin Integrity  Goal: Skin integrity is maintained or improved  Outcome: Progressing     Problem: Pain - Standard  Goal: Alleviation of pain or a reduction in pain to the patient’s comfort goal  Outcome: Progressing     Problem: Psychosocial  Goal: Patient's level of anxiety will decrease  Outcome: Progressing     Problem: Discharge Barriers/Planning  Goal: Patient's continuum of care needs are met  Outcome: Progressing     Problem: Infection - Standard  Goal: Patient will remain free from infection  Outcome: Progressing     Problem: Acute Care of the Cardiac Cath Patient  Goal: Pre Procedure Optimal Outcome for the Cardiac Cath Patient  Outcome: Progressing  Goal: Post Procedure Optimal Outcome for the Cardiac Cath Patient  Outcome: Progressing     Problem: Care Map:  Admission Optimal Outcome for the Heart Failure Patient  Goal: Admission:  Optimal Care of the heart failure patient  Outcome: Progressing     Problem: Care Map:  Day 1 Optimal Outcome for the Heart Failure Patient  Goal: Day 1:  Optimal Care of the heart failure patient  Outcome: Progressing     Problem: Care Map:  Day 2 Optimal Outcome for the Heart Failure Patient  Goal: Day 2:  Optimal Care of the heart failure patient  Outcome: Progressing     Problem: Care Map:  Day 3 Optimal Outcome for the Heart Failure Patient  Goal: Day 3:  Optimal Care of the heart failure patient  Outcome: Progressing     Problem: Care Map:  Day Before Discharge Optimal Outcome for the Heart Failure Patient  Goal: Day Before Discharge:  Optimal Care of the heart failure patient  Outcome: Progressing     Problem: Care Map:  Day of Discharge Optimal Outcome for the Heart Failure Patient  Goal: Day of Discharge:  Optimal Care of the heart failure  patient  Outcome: Progressing   The patient is Stable - Low risk of patient condition declining or worsening    Shift Goals  Clinical Goals: monitor vss  Patient Goals: go home  Family Goals: ELANA    Progress made toward(s) clinical / shift goals:  monitor cramping, no pain, possible discharge    Patient is not progressing towards the following goals:

## 2023-08-15 NOTE — DISCHARGE SUMMARY
Discharge Summary    CHIEF COMPLAINT ON ADMISSION  No chief complaint on file.      Reason for Admission  chest pain     Admission Date  8/12/2023    CODE STATUS  Full Code    HPI & HOSPITAL COURSE  This is a 73 y.o. 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 There is normal left ventricular wall thickness. Left ventricular systolic function is severely reduced. The Ejection Fraction estimate is 25-30%. There is apical akinesis. There is severe global hypokinesis of the left ventricle), Pacemaker, CAD status post stents x3 2 years ago presented to outside facility emergency department with substernal chest pain.  Patient reports that she start experiencing substernal chest pain today while she was sitting and described as oppressive and severe in intensity.  Patient reports that pain was similar to that of when she had a heart attack years ago which prompted her to go to emergency department and took a baby aspirin prior to.  Pain resolved with nitroglycerin.  Associated symptoms with dyspnea and left upper extremity numbness/tingling.  At the outside facility patient was noted for stable vitals and saturating well on room air.  CBC with leukocytosis and patient noted for elevated troponins at 28, 66.  Noted for new left bundle branch block as per transfer notes.  Patient underwent chest CTA without signs of PE.  Patient received Plavix and was started on IV heparin.  Patient was admitted for NSTEMI requiring heparin drip as well as urgent cardiology evaluation and further work-up and management.  She had cardiac cath on 8/13/23 with successful aspiration thrombectomy and PCI of the apical LAD with restoration of ROSANNE-3 flow and no residual stenosis.Tolerated procedure well. She has been started on GDMT for her HF,  per cardiology will need to follow up as outpatient and also need a BMP as outpatient by Friday which has been ordered to check on her renal function    Patient will be discharged home today.    The patient met 2-midnight criteria for an inpatient stay at the time of discharge.    Discharge Date  8/15/23    FOLLOW UP ITEMS POST DISCHARGE  Cardiology     DISCHARGE DIAGNOSES  Principal Problem:    NSTEMI (non-ST elevated myocardial infarction) (HCC) (POA: Yes)  Active Problems:    Essential hypertension (POA: Yes)    Dyslipidemia (POA: Yes)    Obstructive sleep apnea syndrome (POA: Yes)      Overview: Last Assessment & Plan:       Formatting of this note might be different from the original.      - Improved tolerance to therapy.      - This patient will benefit from continued use of PAP therapy 7-8 hours       nightly.      - Monitor for signs and symptoms consistent with ineffective PAP therapy.      - I showed her a picture of the over-the-nose full face mask.      - Replace supplies according to manufacturers recommendations.      - Follow up in 6 months.    Panlobular emphysema (HCC) (POA: Yes)    Atrial fibrillation (HCC) (POA: Yes)      Overview: Formatting of this note might be different from the original.      Added automatically from request for surgery [id number]    CKD (chronic kidney disease) (POA: Yes)    HFrEF (heart failure with reduced ejection fraction) (HCC) (POA: Yes)  Resolved Problems:    * No resolved hospital problems. *      FOLLOW UP  No future appointments.  No follow-up provider specified.    MEDICATIONS ON DISCHARGE     Medication List        START taking these medications        Instructions   clopidogrel 75 MG Tabs  Start taking on: August 16, 2023  Commonly known as: Plavix   Take 1 Tablet by mouth every day.  Dose: 75 mg            CONTINUE taking these medications        Instructions   atorvastatin 40 MG Tabs  Commonly known as: Lipitor   Take 1 Tablet by mouth every evening.  Dose: 40  mg     carvedilol 6.25 MG Tabs  Commonly known as: Coreg   Take 6.25 mg by mouth 2 times a day.  Dose: 6.25 mg     Eliquis 5mg Tabs  Generic drug: apixaban   Take 5 mg by mouth 2 times a day.  Dose: 5 mg     lisinopril 20 MG Tabs  Commonly known as: Prinivil   Take 40 mg by mouth every day.  Dose: 40 mg     spironolactone 25 MG Tabs  Commonly known as: Aldactone   Take 12.5 mg by mouth every day. Half tablet = 12.5 mg  Dose: 12.5 mg            STOP taking these medications      amLODIPine 5 MG Tabs  Commonly known as: Norvasc              Allergies  Allergies   Allergen Reactions    Chlorhexidine Rash       DIET  Orders Placed This Encounter   Procedures    Diet Order Diet: Cardiac     Standing Status:   Standing     Number of Occurrences:   1     Order Specific Question:   Diet:     Answer:   Cardiac [6]       ACTIVITY  As tolerated.  Weight bearing as tolerated    CONSULTATIONS  Cardiology     PROCEDURES  Successful aspiration thrombectomy and PCI of the apical LAD with restoration of ROSANNE-3 flow and no residual stenosis.    LABORATORY  Lab Results   Component Value Date    SODIUM 139 08/15/2023    POTASSIUM 3.9 08/15/2023    CHLORIDE 110 08/15/2023    CO2 18 (L) 08/15/2023    GLUCOSE 107 (H) 08/15/2023    BUN 35 (H) 08/15/2023    CREATININE 1.24 08/15/2023    GLOMRATE 43 (L) 10/30/2021        Lab Results   Component Value Date    WBC 13.3 (H) 08/14/2023    HEMOGLOBIN 13.5 08/14/2023    HEMATOCRIT 40.7 08/14/2023    PLATELETCT 326 08/14/2023        Total time of the discharge process exceeds 35 minutes.

## 2023-08-15 NOTE — DISCHARGE INSTRUCTIONS
Diagnosis:  Acute Coronary Syndrome (ACS) is a diagnosis that encompasses cardiac-related chest pain and heart attack. ACS occurs when the blood flow to the heart muscle is severely reduced or cut off completely due to a slow process called atherosclerosis.  Atherosclerosis is a disease in which the coronary arteries become narrow from a buildup of fat, cholesterol, and other substances that combine to form plaque. If the plaque breaks, a blood clot will form and block the blood flow to the heart muscle. This lack of blood flow can cause damage or death to the heart muscle which is called a heart attack or Myocardial Infarction (MI). There are two different types of MIs:  ST Elevation Myocardial Infarction or STEMI (the most severe type of heart attack) and Non-ST Elevation Myocardial Infarction or NSTEMI.    Treatment Plan:  Cardiac Diet  - Low fat, low salt, low cholesterol   Cardiac Rehab  - Your doctor has ordered you a referral to Psychiatric Rehab.  Call [phone] to schedule an appointment.  Attend my follow-up appointment with my Cardiologist.  Take my medications as prescribed by my doctor  Exercise daily  Quit Smoking, Lower my bad cholesterol and raise my good cholesterol, lower my blood pressure, and Reduce stress    Medications:  Certain medications are used to treat ACS.  Remember to always take medications as prescribed and never stop talking medications unless told by your doctor.    You have been prescribed the following medicatons:    Anti-platelet/blood thinner - Your Anti-platelet/Blood thinning medication is called CLOPIDOGREL (PLAVIX), and is used in combination with aspirin to prevent clots from forming in your heart and/or around your stent.  Your doctor will determine how long you need to be on this medicine.  Beta-Blocker - Beta-Blocker CARVEDILOL (COREG) is used to lower blood pressure and heart rate, and/or helps your heart heal after a heart attack.  Statin - Statin ATORVASTATIN (LIPITOR) is  used to lower cholesterol.  Angiotensin Converting Enzyme (ACE) Inhibitor - Angiotensin Converting Enzyme Inhibitor LISINOPRIL (PRINIVIL) is used to lower blood pressure and treat heart failure.          HF Patient Discharge Instructions  Monitor your weight daily, and maintain a weight chart, to track your weight changes.   Activity as tolerated, unless your Doctor has ordered otherwise.  Follow a low fat, low cholesterol, low salt diet unless instructed otherwise by your Doctor. Read the labels on the back of food products and track your intake of fat, cholesterol and salt.   Fluid Restriction No. If a Fluid Restriction has been ordered by your Doctor, measure fluids with a measuring cup to ensure that you are not exceeding the restriction.   No smoking.  Oxygen No. If your Doctor has ordered that you wear Oxygen at home, it is important to wear it as ordered.  Did you receive an explanation from staff on the importance of taking each of your medications and why it is necessary to keep taking them unless your doctor says to stop? Yes  Were all of your questions answered about how to manage your heart failure and what to do if you have increased signs and symptoms after you go home? Yes  Do you feel like your heart failure care team involved you in the care treatment plan and allowed you to make decisions regarding your care while in the hospital and addressed any discharge needs you might have? Yes    See the educational handout provided at discharge for more information on monitoring your daily weight, activity and diet. This also explains more about Heart Failure, symptoms of a flare-up and some of the tests that you have undergone.     Warning Signs of a Flare-Up include:  Swelling in the ankles or lower legs.  Shortness of breath, while at rest, or while doing normal activities.   Shortness of breath at night when in bed, or coughing in bed.   Requiring more pillows to sleep at night, or needing to sit up at  night to sleep.  Feeling weak, dizzy or fatigued.     When to call your Doctor:  Call [facility] seven days a week from 8:00 a.m. to 8:00 p.m. for medical questions [phone].  Call your Primary Care Physician or Cardiologist if:   You experience any pain radiating to your jaw or neck.  You have any difficulty breathing.  You experience weight gain of 3 lbs in a day or 5 lbs in a week.   You feel any palpitations or irregular heartbeats.  You become dizzy or lose consciousness.   If you have had an angiogram or had a pacemaker or AICD placed, and experience:  Bleeding, drainage or swelling at the surgical / puncture site.  Fever greater than 100.0 F  Shock from internal defibrillator.  Cool and / or numb extremities.     Please access the [facility] My HF Guide/Heart Failure Interactive Workbook:   [email or web address]